# Patient Record
Sex: MALE | Race: WHITE
[De-identification: names, ages, dates, MRNs, and addresses within clinical notes are randomized per-mention and may not be internally consistent; named-entity substitution may affect disease eponyms.]

---

## 2017-05-26 ENCOUNTER — HOSPITAL ENCOUNTER (EMERGENCY)
Dept: HOSPITAL 68 - ERH | Age: 52
End: 2017-05-26
Payer: COMMERCIAL

## 2017-05-26 VITALS — WEIGHT: 170 LBS | BODY MASS INDEX: 22.53 KG/M2 | HEIGHT: 73 IN

## 2017-05-26 VITALS — SYSTOLIC BLOOD PRESSURE: 122 MMHG | DIASTOLIC BLOOD PRESSURE: 87 MMHG

## 2017-05-26 DIAGNOSIS — N18.9: Primary | ICD-10-CM

## 2017-05-26 LAB
ABSOLUTE GRANULOCYTE CT: 6.7 /CUMM (ref 1.4–6.5)
BASOPHILS # BLD: 0 /CUMM (ref 0–0.2)
BASOPHILS NFR BLD: 0.5 % (ref 0–2)
EOSINOPHIL # BLD: 0.1 /CUMM (ref 0–0.7)
EOSINOPHIL NFR BLD: 1.1 % (ref 0–5)
ERYTHROCYTE [DISTWIDTH] IN BLOOD BY AUTOMATED COUNT: 14.9 % (ref 11.5–14.5)
GRANULOCYTES NFR BLD: 77.2 % (ref 42.2–75.2)
HCT VFR BLD CALC: 43.1 % (ref 42–52)
LYMPHOCYTES # BLD: 0.9 /CUMM (ref 1.2–3.4)
MCH RBC QN AUTO: 25.9 PG (ref 27–31)
MCHC RBC AUTO-ENTMCNC: 32 G/DL (ref 33–37)
MCV RBC AUTO: 81 FL (ref 80–94)
MONOCYTES # BLD: 0.9 /CUMM (ref 0.1–0.6)
PLATELET # BLD: 221 /CUMM (ref 130–400)
PMV BLD AUTO: 10 FL (ref 7.4–10.4)
RED BLOOD CELL CT: 5.32 /CUMM (ref 4.7–6.1)
WBC # BLD AUTO: 8.6 /CUMM (ref 4.8–10.8)

## 2017-05-26 NOTE — ED GENERAL ADULT
History of Present Illness
 
General
Chief Complaint: Abdominal Pain/Flank Pain
Stated Complaint: "PER PT ABD PAIN"
Source: patient
Exam Limitations: no limitations
 
Vital Signs & Intake/Output
Vital Signs & Intake/Output
 Vital Signs
 
 
Date Time Temp Pulse Resp B/P B/P Pulse O2 O2 Flow FiO2
 
     Mean Ox Delivery Rate 
 
 1618 98.0 82 16 122/87  98 Room Air  
 
 1324 96.4 83 16 131/84  97 Room Air  
 
 1311      97 Room Air  
 
 1053 96.2 90 16 134/94  95 Room Air  
 
 
Allergies
Coded Allergies:
Iodinated Contrast Media - Oral and (IODINATED CONTRAST MEDIA - IV DYE) (RASH )
 
Reconcile Medications
Calcium Acetate 667 MG CAPSULE   1 CAP PO TID HEALTH SUPPLEMENT  (Reported)
Esomeprazole (Nexium) 40 MG CAPSULE.DR   1 CAP PO BID ACID REFLUX  (Reported)
Oxycodone HCl 5 MG TABLET   1 TAB PO BIDP PRN PAIN
 
Triage Note:
50 Y/O MALE C/O L SIDED ABDOMINAL PAIN X 2-3 DAYS,
LUQ AND LLQ.  REPORTS NAUSEA AND VOMITING BUT
DENIES DIARRHEA.  IS DIALYSIS PATIENT AND NO
LONGER MAKES URINE.  LAST DIALYSIS WEDNESDAY.
AFEBRILE
Triage Nurses Notes Reviewed? yes
Onset: Gradual
Duration: day(s): (3)
Timing: remote history
Injury Environment: home
Severity: severe
Severity Numbers: 8
No Modifying Factors: none
HPI:
Patient is a 51-year-old male with history of chronic renal disease, 
hypertension presenting to the emergency department with chief complaint of left
-sided flank and left-sided chest pain that began about 2-3 days ago has been 
constant.  Nothing seems to make it better or worse.  Patient reports he had 
similar pain when he headache left lung several years ago.  Denies any shortness
of breath.  No palpitations.  Denies any nausea or vomiting.  No diarrhea.  He 
thought he was constipated initially at onset of symptoms but has been moving 
his bowels of the past 2 days without difficulty.  No change in appetite.  
Denies taking anything at home to help with the pain.  No change with exertion 
or movement or position.  He has been on dialysis for the past 18 years, he goes
 and Friday.  He was on his way to dialysis this morning but 
decided that he should come to the emergency department for evaluation of this 
pain.
(DAVID SPEAR)
 
Past History
 
Travel History
Traveled to [place] past 21 day No
 
Medical History
Any Pertinent Medical History? see below for history
Neurological: CVA, migraine
EENT: NONE
Cardiovascular: hypertension
Respiratory: he had a collapsed left lung with a persistent left pleural 
effusion.
Gastrointestinal: GI BLEED
Hepatic: NONE
Renal: ESRD on HD
Musculoskeletal: osteoarthritis, secondary OSTEOPOROSIS GANGREEN L 2ND FINGER
Psychiatric: NONE
Endocrine: hyperparathyroidism, secondary hyperparathyroidism
Blood Disorders: NONE
Cancer(s): NONE
GYN/Reproductive: NONE
Other Medical Hx:
In  he had an infected abscess with MSSA bacteremia.
 Complete central venous occlusion in .
History of MRSA: No
History of VRE: No
History of CDIFF: No
 
Surgical History
Surgical History: GRAFT, LEFT THIGH he also had placement of numerous August 
catheters/also dual-lumen Amor's left radial cephalic primary aVF on 2000 left upper arm AVG Tenckhoff catheter placement and subsequent removal
 
Psychosocial History
Who do you live with Family
Services at Home Nursing
What is your primary language English
Tobacco Use: Never used
 
Family History
Family History, If Any:
MOTHER (Heart Dz, Breast Ca).
FATHER (Diabetes).
Relation not specified for:
  FH: stroke
 
Hx Contributory? Yes
(DAVID SPEAR)
 
Review of Systems
 
Review of Systems
Constitutional:
Reports: no symptoms. 
Comments
Review of systems: See HPI, All other systems negative.
Constitutional, no chills fever or weight loss
HEENT: No visual changes no sore throat no congestion
Cardiovascular: No palpitation , orthopnea or ankle swelling
Skin, no jaundice no rashes
Respiratory: No dyspnea cough sputum or hemoptysis
GI: No nausea no vomiting
: No dysuria No hematuria
Muscle skeletal: no back pain, no neck pain,
Neurologic: No numbness no confusion
Psych: No stress anxiety or depression,.
Heme/endocrine: No bruising no bleeding no polyuria or polydipsia
Immunology: No splenectomy or history of AIDS
(DAVID SPEAR)
 
Physical Exam
 
Physical Exam
General Appearance: alert, awake, mild distress
Comments:
Well-developed well-nourished person in mild distress
HEENT: Pupils equally round and reactive to light and accommodation. Nose is 
atraumatic. Pharynx normal. No swelling or edema. 
Neck: Supple, no lymphadenopathy, normal range of motion without pain or 
tenderness
Back: Nontender, no CVA tenderness. Full range of motion
Cardiovascular: Regular rate and rhythms no murmurs rubs or gallops, normal JVP
Respiratory: Chest nontender. No respiratory distress.breath sounds clear to 
auscultation bilaterally
Abdomen: Soft, nontender nondistended, no appreciable organomegaly. Normal bowel
sounds. No ascites Stanley rebound or guarding.
Extremity: No edema
Neuro: Alert oriented x3
Skin: No appreciable rash on exposed skin, skin is warm and dry.
Psych: Mood and affect is normal, memory and judgment is normal.
 
Core Measures
ACS in differential dx? Yes
CVA/TIA Diagnosis: No
Severe Sepsis Present: No
Septic Shock Present: No
(DAVID SPEAR)
 
Progress
Differential Diagnoses
I considered the following diagnoses in my evaluation of the patient:  ACS, 
pneumothorax,
 
Plan of Care:
 Orders
 
 
Procedure Date/time Status
 
TROPONIN LEVEL  1445 Complete
 
EKG  1445 Active
 
Telemetry/Cardiac Monitor  1116 Active
 
TSH REFLEX  1116 Complete
 
TROPONIN LEVEL  1116 Complete
 
LIPASE  1116 Complete
 
COMPREHENSIVE METABOLIC PANEL  1116 Complete
 
CBC WITHOUT DIFFERENTIAL  1116 Complete
 
EKG  1116 Active
 
 
 Laboratory Tests
 
 
 
17 1445:
Troponin I 0.01
 
17 1140:
Anion Gap 23  H, Estimated GFR 6  L, BUN/Creatinine Ratio 4.3  L, Glucose 77, 
Calcium 7.3  L, Total Bilirubin 0.8, AST 15  L, ALT 30, Alkaline Phosphatase 87,
Troponin I 0.01, Total Protein 8.0, Albumin 4.4, Globulin 3.6, Albumin/Globulin 
Ratio 1.2, Lipase 192, TSH &T3 &Free T4 Intrp 1.360, CBC w Diff NO MAN DIFF REQ,
RBC 5.32, MCV 81.0, MCH 25.9  L, RDW 14.9  H, MPV 10.0, Gran % 77.2  H, 
Lymphocytes % 10.5  L, Monocytes % 10.7  H, Eosinophils % 1.1, Basophils % 0.5, 
Absolute Granulocytes 6.7  H, Absolute Lymphocytes 0.9  L, Absolute Monocytes 
0.9  H, Absolute Eosinophils 0.1, Absolute Basophils 0, PUBS MCHC 32.0  L
Diagnostic Imaging:
Viewed by Me: Radiology Read.  Discussed w/RAD: Radiology Read. 
Radiology Impression: PATIENT: JC CANDELARIA  MEDICAL RECORD NO: [id number] 
PRESENT AGE: 51  PATIENT ACCOUNT NO: [id number] : [birth year]  LOCATION: [facility] 
ORDERING PHYSICIAN: DAVID DE LEON     SERVICE DATE:  EXAM 
TYPE: RAD - XRY-PORTABLE CHEST XRAY EXAMINATION: XR PORTABLE CHEST CLINICAL 
INFORMATION: Chest pain. Evaluate for cardiomegaly. COMPARISON: CXR from 2016 and 2016 TECHNIQUE: Portable AP view of the chest was obtained. 
FINDINGS: Chronic pleural-parenchymal fibrotic changes of the left hemithorax 
with pleural-based calcifications encasing the left lung. Chronic left lung 
volume loss with hyperexpansion of the right lung and left-sided shift of the 
cardiomediastinal silhouette. Cardiac silhouette is normal in size. No acute 
pulmonary edema or pneumothorax. Left axillary vascular stent is noted. No acute
skeletal findings. IMPRESSION: 1. No acute cardiopulmonary abnormality. 2. 
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 
compared to 2016.
Initial ED EKG: sinus rhythm at 85 bpm, first-degree AV block, low voltage 
throughout, nonspecific T abnormalities in the lateral leads
Prior EKG: changed (IMPROVED)
Repeat EKG: unchanged
Comments:
perc criteria zero (0)
 
Patient informed of all lab work results.  Repeat troponin is negative.  Repeat 
EKG unchanged.  Unclear as to etiology of patient's pain at this time.  Patient 
had total resolution after 2 mg of morphine.  Has been pain-free since 
administering that medication.  He'll follow-up with his primary doctor, he will
call his nephrologist to schedule dialysis later today or tomorrow morning.
(SAKSHI DE LEON,DAVID)
 
Departure
 
Departure
Time of Disposition: 
Disposition: HOME OR SELF CARE
Condition: Stable
Clinical Impression
Primary Impression: Flank pain
Secondary Impressions: 
Chronic renal failure
Qualifiers:  Chronic kidney disease stage: stage 4 (severe) Qualified Code: 
N18.4 - Chronic kidney disease, stage 4 (severe)
 
Lung fibrosis
 
Nausea
 
Referrals:
PATIENT HAS NO PRIMARY CARE DR (PCP/Family)
 
Additional Instructions:
Follow-up with your primary care physician call to make an appointment.  Return 
for worsening symptoms or concerns.  Take oxycodone as prescribed to help with 
any pain.  
Departure Forms:
Customer Survey
General Discharge Information
Prescriptions:
Current Visit Scripts
Oxycodone HCl 1 TAB PO BIDP PRN PAIN 
     #8 TAB 
 
 
(DAVID SPEAR)
 
PA/NP Co-Sign Statement
Statement:
ED Attending supervision documentation-
 
[] I saw and evaluated the patient. I have also reviewed all the pertinent lab 
results and diagnostic results. I agree with the findings and the plan of care 
as documented in the PA's/NP's documentation. 
 
[X] I have reviewed the ED Record and agree with the PA's/NP's documentation.
 
[] Additions or exceptions (if any) to the PAs/NP's note and plan are 
summarized below:
[]
 
(JOHN BEDOLLA,ARPAN)
 
Critical Care Note
 
Critical Care Note
Critical Care Time: non-applicable
(DAVID SPEAR)

## 2017-05-26 NOTE — RADIOLOGY REPORT
EXAMINATION:
XR PORTABLE CHEST
 
CLINICAL INFORMATION:
Chest pain. Evaluate for cardiomegaly.
 
COMPARISON:
CXR from 03/22/2016 and 05/20/2016
 
TECHNIQUE:
Portable AP view of the chest was obtained.
 
FINDINGS:
Chronic pleural-parenchymal fibrotic changes of the left hemithorax with
pleural-based calcifications encasing the left lung.
 
Chronic left lung volume loss with hyperexpansion of the right lung and
left-sided shift of the cardiomediastinal silhouette. Cardiac silhouette is
normal in size. No acute pulmonary edema or pneumothorax. Left axillary
vascular stent is noted.
 
No acute skeletal findings.
 
IMPRESSION:
 
1. No acute cardiopulmonary abnormality.
2. 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
compared to 03/22/2016.

## 2018-03-19 ENCOUNTER — HOSPITAL ENCOUNTER (INPATIENT)
Dept: HOSPITAL 68 - ERH | Age: 53
LOS: 10 days | Discharge: HOME HEALTH | DRG: 570 | End: 2018-03-29
Admitting: INTERNAL MEDICINE
Payer: COMMERCIAL

## 2018-03-19 VITALS — BODY MASS INDEX: 26.77 KG/M2 | WEIGHT: 202 LBS | HEIGHT: 73 IN

## 2018-03-19 VITALS — DIASTOLIC BLOOD PRESSURE: 66 MMHG | SYSTOLIC BLOOD PRESSURE: 103 MMHG

## 2018-03-19 DIAGNOSIS — Z89.022: ICD-10-CM

## 2018-03-19 DIAGNOSIS — Z99.2: ICD-10-CM

## 2018-03-19 DIAGNOSIS — E21.3: ICD-10-CM

## 2018-03-19 DIAGNOSIS — L97.429: Primary | ICD-10-CM

## 2018-03-19 DIAGNOSIS — N18.6: ICD-10-CM

## 2018-03-19 DIAGNOSIS — I95.9: ICD-10-CM

## 2018-03-19 DIAGNOSIS — M10.9: ICD-10-CM

## 2018-03-19 DIAGNOSIS — K21.9: ICD-10-CM

## 2018-03-19 DIAGNOSIS — Z86.73: ICD-10-CM

## 2018-03-19 DIAGNOSIS — Z91.041: ICD-10-CM

## 2018-03-19 DIAGNOSIS — M86.8X7: ICD-10-CM

## 2018-03-19 DIAGNOSIS — L03.116: ICD-10-CM

## 2018-03-19 LAB
ABSOLUTE GRANULOCYTE CT: 9 /CUMM (ref 1.4–6.5)
APTT BLD: 36 SEC (ref 25–37)
BASOPHILS # BLD: 0.1 /CUMM (ref 0–0.2)
BASOPHILS NFR BLD: 0.5 % (ref 0–2)
EOSINOPHIL # BLD: 0.2 /CUMM (ref 0–0.7)
EOSINOPHIL NFR BLD: 1.4 % (ref 0–5)
ERYTHROCYTE [DISTWIDTH] IN BLOOD BY AUTOMATED COUNT: 21.7 % (ref 11.5–14.5)
GRANULOCYTES NFR BLD: 80 % (ref 42.2–75.2)
HCT VFR BLD CALC: 48.8 % (ref 42–52)
LYMPHOCYTES # BLD: 0.9 /CUMM (ref 1.2–3.4)
MCH RBC QN AUTO: 26.5 PG (ref 27–31)
MCHC RBC AUTO-ENTMCNC: 31.7 G/DL (ref 33–37)
MCV RBC AUTO: 83.6 FL (ref 80–94)
MONOCYTES # BLD: 1.1 /CUMM (ref 0.1–0.6)
PLATELET # BLD: 256 /CUMM (ref 130–400)
PMV BLD AUTO: 10.3 FL (ref 7.4–10.4)
PROTHROMBIN TIME: 11.7 SEC (ref 9.4–12.5)
RED BLOOD CELL CT: 5.84 /CUMM (ref 4.7–6.1)
WBC # BLD AUTO: 11.2 /CUMM (ref 4.8–10.8)

## 2018-03-19 PROCEDURE — C1725 CATH, TRANSLUMIN NON-LASER: HCPCS

## 2018-03-19 PROCEDURE — 2NSBP: CPT

## 2018-03-19 PROCEDURE — C1760 CLOSURE DEV, VASC: HCPCS

## 2018-03-19 NOTE — ED ANKLE/FOOT INJURY COMPLAINT
History of Present Illness
 
General
Chief Complaint: Laceration Procedure
Stated Complaint: L LEG "CUT" ?INFECTION
Source: patient
Exam Limitations: no limitations
 
Vital Signs & Intake/Output
Vital Signs & Intake/Output
 Vital Signs
 
 
Date Time Temp Pulse Resp B/P B/P Pulse O2 O2 Flow FiO2
 
     Mean Ox Delivery Rate 
 
03/19 2233 97.6 94 18 103/66  99 Room Air  
 
03/19 2041 96.8 93 18 92/50  100 Room Air  
 
03/19 1732 97.6 101 18 102/70  92 Room Air  
 
03/19 1608 97.0 98 20 109/69  94 Room Air  
 
 
 
Allergies
Coded Allergies:
Iodinated Contrast- Oral and IV Dye (IODINATED CONTRAST MEDIA - IV DYE) (RASH 03
/19/18)
 
Triage Note:
TRIAGE: PT TO ER FOR CUT ON L HEEL, STATES THEY
LOOKED AT IT AT DIALYSIS TODAY AND THEY SEND TO
COME AND HAVE IT LOOKED AT AS IT MAY BE INFECTED.
NOT VISUALIZED AT TRIAGE, HAS SHOES/SOCKS ON.
DENIES ANY PAIN AT PRESENT BUT STATES HE HAD PAIN
LAST NIGHT.
PT GETS DIALYSIS M-W-F THRU ACCESS ON LEFT UPPER
LEG.
Triage Nurses Notes Reviewed? yes
Occurred: last week
Duration: week(s): (1), constant, continues in ED, getting worse
Timing: single episode today
Severity: moderate, severe
Severity Numbers: 6
Pain/Injury Location:
Left: Heel. 
Method of Injury: unknown
No Modifying Factors: none
Associated Symptoms: swelling, redness
HPI:
52-year-old male past medical history of end-stage renal disease on dialysis 
presents for evaluation of a wound to left heel.  Patient reports he first 
noticed this about one week ago.  He went to dialysis today and the dialysis 
nurses felt like it may be infected.  He denies any known trauma to the area or 
triggering event.  He is not a diabetic.  He is on dialysis related to 
hypertension.  He goes Monday Wednesday Friday.  He denies any fever or chills 
numbness or tingling.  No other joint swelling or pain.  The pain is worse when 
he is walking but he is able to walk.  He rates pain as a 6 or 7 out of 10 with 
walking.  He has not taken any medicine for his pain.  No chest pain shortness 
of breath palpitations or any other associated symptoms.
(Jer DE LEON,Romain)
Reconcile Medications
Esomeprazole (Nexium) 40 MG CAPSULE.   1 CAP PO BID ACID REFLUX  (Reported)
Omeprazole 20 MG CAPSULE.   1 CAP PO DAILY ACID REFLUX  (Reported)
 
(Oleg BEDOLLA,Bryon CAMPBELL)
 
Past History
 
Travel History
Traveled to [place] past 21 day No
 
Medical History
Any Pertinent Medical History? see below for history
Neurological: CVA, migraine
EENT: NONE
Cardiovascular: hypertension
Respiratory: he had a collapsed left lung with a persistent left pleural 
effusion.
Gastrointestinal: GI BLEED
Hepatic: NONE
Renal: ESRD on HD, DIALYSIS M-W-F ACCESS TO L LEG
Musculoskeletal: osteoarthritis, secondary OSTEOPOROSIS GANGREEN L 2ND FINGER
Psychiatric: NONE
Endocrine: hyperparathyroidism
Blood Disorders: NONE
Cancer(s): NONE
GYN/Reproductive: NONE
Other Medical Hx:
In 2004 he had an infected abscess with MSSA bacteremia.
 Complete central venous occlusion in 2007.
History of MRSA: No
History of VRE: No
History of CDIFF: No
 
Surgical History
Surgical History: GRAFT, LEFT THIGH he also had placement of numerous August 
catheters/also dual-lumen Amor's left radial cephalic primary aVF on 03/28/
2000 left upper arm AVG Tenckhoff catheter placement and subsequent removal
 
Psychosocial History
Who do you live with Family
Services at Home Nursing
What is your primary language English
Tobacco Use: Never used
ETOH Use: denies use
Illicit Drug Use: denies illicit drug use
 
Family History
Family History, If Any:
MOTHER (Heart Dz, Breast Ca).
FATHER (Diabetes).
Relation not specified for:
  FH: stroke
 
Hx Contributory? No
(Romain Shah)
 
Review of Systems
 
Review of Systems
Constitutional:
Reports: no symptoms. 
EENTM:
Reports: no symptoms. 
Respiratory:
Reports: no symptoms. 
Cardiovascular:
Reports: no symptoms. 
GI:
Reports: no symptoms. 
Genitourinary:
Reports: no symptoms. 
Musculoskeletal:
Reports: no symptoms. 
Skin:
Reports: see HPI, erythema, lesions. 
Neurological/Psychological:
Reports: no symptoms. 
Hematologic/Endocrine:
Reports: no symptoms. 
Immunologic/Allergic:
Reports: no symptoms. 
All Other Systems: Reviewed and Negative
(Romain Shah)
 
Physical Exam
 
Physical Exam
General Appearance: well developed/nourished, no apparent distress, alert, awake
Head: atraumatic, normal appearance
Eyes:
Bilateral: normal appearance, PERRL, EOMI. 
Ears, Nose, Throat: normal pharynx, normal ENT inspection, hearing grossly 
normal
Neck: normal inspection, supple, full range of motion
Cardiovascular/Respiratory: normal breath sounds, normal peripheral pulses, 
regular rate/rhythm, no respiratory distress
Back: normal inspection, normal range of motion
Leg/Knee/Thigh Left: normal range of motion, normal inspection
Leg/Knee/Thigh Right: normal range of motion, normal inspection
Ankle Left: normal inspection, normal range of motion
Ankle Right: normal inspection, normal range of motion
Foot Left: THERE IS A ULCERATED WOUND TO THE POSTERIOR ASPECT OF THE LEFT HEEL 
JUST INFERIOR TO THE INSERTION OF THE aCHILLES TENDON.  iT IS APPROXIMATELY 4 CM
X 2 CM ULCERATION.  tHERE IS SURROUNDING ERYTHEMA AND MACERATED TISSUE.  tHE 
ERYTHEMA EXTENDS ALONG THE MEDIAL ASPECT OF THE FOOT TO THE TOES.  tHERE IS MILD
SOFT TISSUE SWELLING.  tHERE IS NO LYMPHATIC STREAKING OR ERYTHEMA GOING UP THE 
LEG.  nO CALF SWELLING OR TENDERNESS FULL RANGE OF MOTION OF THE FOOT IS INTACT 
NEUROVASCULAR SUPPLY INTACT
Foot Right: normal inspection, normal range of motion
Neuro/Vascular: normal motor function, normal sensation
Tendon: normal tendon function
Psychiatric: awake, alert, oriented x 3
Skin: intact, normal color, warm/dry
(Black PA,Romain)
 
Progress
Differential Diagnosis: arterial insufficiency, cellulitis, septic arthritis, 
gout, fracture, dislocation, sprain, contusion, OSTEOMYELITIS
Plan of Care:
 Orders
 
 
Procedure Date/time Status
 
Nothing by Mouth 03/20 B Active
 
Wound Care/Dressing 03/19 2302 Active
 
Weight 03/19 2231 Active
 
Vital Signs 03/19 2231 Active
 
Teach/Educate 03/19 2231 Active
 
Pain Treatment and Response 03/19 2231 Active
 
Nutritional Intake, Monitor 03/19 2231 Active
 
Isolation 03/19 2231 Active
 
Intake & Output 03/19 2231 Active
 
Patient Care Conference 03/19 2231 Active
 
Activity/Ambulation 03/19 2231 Active
 
LACTIC ACID 03/19 2137 Complete
 
ED Holding Orders 03/19 2121 Active
 
Admit to inpatient 03/19 2121 Active
 
Vital Signs 03/19 2121 Active
 
Code Status 03/19 2121 Active
 
Patient Data 03/19 2116 Active
 
BLOOD CULTURE 03/19 2035 Active
 
Add-on Test (ER Only) 03/19 2033 Active
 
EKG 03/19 2033 Active
 
Add-on Test (ER Only) 03/19 2018 Active
 
TROPONIN LEVEL 03/19 1852 Complete
 
PARTIAL THROMBOPLASTIN TIME 03/19 1852 Complete
 
PROTHROMBIN TIME 03/19 1852 Complete
 
TYPE & SCREEN (NOT X-MATCH) 03/19 1852 Complete
 
BLOOD CULTURE 03/19 1837 Active
 
LACTIC ACID 03/19 1837 Complete
 
WESTERGREN SED RATE 03/19 1837 Complete
 
C-REACTIVE PROTEIN 03/19 1837 Complete
 
COMPREHENSIVE METABOLIC PANEL 03/19 1837 Complete
 
CBC WITHOUT DIFFERENTIAL 03/19 1837 Complete
 
 
 Laboratory Tests
 
 
 
03/19/18 2100:
Lactic Acid 1.7
 
03/19/18 1852:
Anion Gap 27  H, Estimated GFR 8  L, BUN/Creatinine Ratio 3.6  L, Glucose 83, 
Lactic Acid 2.8  H, Calcium 8.6, Total Bilirubin 0.8, AST 17, ALT 14  L, 
Alkaline Phosphatase 84, Troponin I 0.02, C-Reactive Prot, Quant 4.7  H, Total 
Protein 9.1  H, Albumin 4.8, Globulin 4.3  H, Albumin/Globulin Ratio 1.1, PT 
11.7, INR 1.07, APTT 36, CBC w Diff NO MAN DIFF REQ, RBC 5.84, MCV 83.6, MCH 
26.5  L, MCHC 31.7  L, RDW 21.7  H, MPV 10.3, Gran % 80.0  H, Lymphocytes % 8.2 
L, Monocytes % 9.9  H, Eosinophils % 1.4, Basophils % 0.5, Absolute Granulocytes
9.0  H, Absolute Lymphocytes 0.9  L, Absolute Monocytes 1.1  H, Absolute 
Eosinophils 0.2, Absolute Basophils 0.1, ESR Westergren 21  H
 Microbiology
03/19 2100  BLOOD: Blood Culture - RECD
03/19 2035  BLOOD: Blood Culture - CAN
                Cancelled: DUPLICATED
03/19 1858  BLOOD: Blood Culture - CAN
                Cancelled: Quantity not sufficient for both blood culture 
bottles.
03/19 1852  BLOOD: Blood Culture - RECD
 
Patient seen and evaluated.  He has history of end-stage renal on dialysis he 
was dialyzed today.  He has a wound to the left heel.  He is not a diabetic.  He
denies any trauma.  X-rays of the area reveal a bony defect along the posterior 
calcaneus that could be osteomyelitis.  Patient does have a mildly elevated 
white blood cell count with left shift and lactic acidosis.  500 mL normal 
saline ordered.  Blood cultures obtained patient does have a mildly elevated CRP
and sedimentation rate of 21.  Vital signs are otherwise stable patient appears 
well.  Case was discussed with Dr. Crocker podiatrist.  He recommends admitting
the patient for bone biopsy and IV antibiotics.  He recommends holding off on IV
antibiotics until bone biopsy.  Patient will require podiatry, nothing by mouth,
serial labs, bone biopsy, follow-up cultures, pain control, monitoring of vital 
signs and physical therapy consult.  Case discussed with Dr. Dejesus he 
agrees.
Diagnostic Imaging:
Viewed by Me: Radiology Read.  Discussed w/RAD: Radiology Read. 
Radiology Impression: EXAM TYPE: RAD - XRY-HEEL, LEFT EXAMINATION: XR CALCANEUS,
LEFT CLINICAL INFORMATION: Osteomyelitis COMPARISON: None TECHNIQUE: Lateral and
axial views of the left calcaneus were obtained. FINDINGS: Vascular 
calcifications. There is felt to be a bony defect along the distal aspect of the
calcaneus and certainly focal osteomyelitis here cannot be excluded. This is 
along the undersurface. There may be bony fusion here. Vascular calcifications. 
IMPRESSION: As described above I cannot rule out osteomyelitis along the 
undersurface of the distal calcaneus. Recommend MR pre and postcontrast further 
evaluate DICTATED BY: Rupert Melendez MD  DATE/TIME DICTATED:03/19/18 / 1926 
:RAD.TONY  DATE/TIME TRANSCRIBED:03/19/18 / 1926
Initial ED EKG: SINUS TACHYCARDIA RATE 106, VENTRICULAR BIGEMINY
(Romain Shah)
 
Departure
 
Departure
Disposition: STILL A PATIENT
Condition: Stable
Clinical Impression
Primary Impression: Osteomyelitis
Qualifiers:  Osteomyelitis type: unspecified type Osteomyelitis location: foot 
Laterality: left Qualified Code: M86.9 - Osteomyelitis, unspecified
Referrals:
Patient Has No Primary Care Dr (PCP/Family)
 
Departure Forms:
Customer Survey
General Discharge Information
 
Admission Note
Spoke With:
Karol Garces MD
Documentation of Exam:
Documentation of any treatments & extenuating circumstances including Concerns 
Regarding Discharge (functional status, medication knowledge or non-compliance, 
living conditions, etc.) that warrant an admission rather than observation: [
Serial labs, podiatry consult, bone biopsy, IV antibiotics, physical therapy]
 
(Romain Shah)
 
PA/NP Co-Sign Statement
Statement:
ED Attending supervision documentation-
 
[X] I saw and evaluated the patient. I have also reviewed all the pertinent lab 
results and diagnostic results. I agree with the findings and the plan of care 
as documented in the PA's/NP's documentation. 
 
[X] I have reviewed the ED Record and agree with the PA's/NP's documentation.
 
[] Additions or exceptions (if any) to the PAs/NP's note and plan are 
summarized below:
[ADMIT FOR BONE BIOPSY, IV ABX, PODIATRY CONSULTATION, NEPHROLOGY CONSULTATION]
 
(Oleg BEDOLLA,Bryon CAMPBELL)
 
(Oleg BEDOLLA,Bryon KEARNS.)

## 2018-03-19 NOTE — RADIOLOGY REPORT
EXAMINATION:
XR CALCANEUS, LEFT
 
CLINICAL INFORMATION:
Osteomyelitis
 
COMPARISON:
None
 
TECHNIQUE:
Lateral and axial views of the left calcaneus were obtained.
 
FINDINGS:
Vascular calcifications.
 
There is felt to be a bony defect along the distal aspect of the calcaneus
and certainly focal osteomyelitis here cannot be excluded. This is along the
undersurface. There may be bony fusion here.
 
Vascular calcifications.
 
IMPRESSION: As described above I cannot rule out osteomyelitis along the
undersurface of the distal calcaneus. Recommend MR pre and postcontrast
further evaluate

## 2018-03-19 NOTE — HISTORY & PHYSICAL
Seferino[place]walk 03/19/18 2134:
General Information and HPI
MD Statement:
I have seen and personally examined JC CANDELARIA and documented this H&P.
 
The patient is a 52 year old M who presented with a patient stated chief 
complaint of pain in left foot and chronic wound on left heel. [].
 
Source of Information: patient, old records
Exam Limitations: no limitations
History of Present Illness:
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 
abuse maintained on hemodialysis since April 2000, HTN, hyperparathyroidism, CVA
, seizures, gout, GERD, GI bleed in 2011, left upper extremity finger amputation
due to chronic osteomyelitis with gangrene and MSSA bacteremia presented to ED 
with chief complaint of left foot pain and chronic left heel ulcer.  Patient 
reported that he was in his usual state of health, one-week back in noticed 
intermittent pain in left heel.  Pain is throbbing,10/10, intermittent, 
increased with lifting left foot and relieved with rest.  He also reported that 
there is is a scab on his left heel that was there for a while.  Patient 
reported redness of the left foot for same time.  He reported that he is getting
dialysis on Monday, Wednesday and Friday.  Today he went to dialysis clinic with
staff noticed redness and scab on left foot and they send the patient to ED for 
evaluation.
Patient denied any chest pain, short of breath, palpitation, chills, fever, 
nausea, vomiting, abdominal pain, discharge from wound, lightheadedness, trauma 
to left foot, pain in legs while walking, melena, blood in vomitus, hemoptysis, 
diarrhea, constipation and dysuria.
Patient reported that he is seeing Dr. Wolff for his kidney problem.  
Patient reported that he had hypertension that caused his kidney problem but 
later on his blood pressure was normal and he is not taking any medication for 
it.  Patient is walking independently without any assistance.  During patient 
examination he became angry and didn't allow us to examine his right foot.  Last
time he was admitted in the hospital in April 2016 with complain of double 
vision and headache.  His last echocardiogram shows ejection fraction or than 60
%.
 
ED course:
Vitals: Temperature 97.0, pulse 98, respiratory rate 20, blood pressure 109/69, 
oxygen saturation 94% on room air
 
Labs: WBC count 11.2, hemoglobin 15.5, hematocrit 48.8, palate count 256, sodium
144, potassium 4.5, BUN 25, creatinine 7.0, anion gap 27, BUN/creatinine ratio 
3.6, glucose 83, lactic acid 2.8, calcium 8.6, AST 17, at 1540, current was 284,
troponin 0.02
 
Allergies/Medications
Allergies:
Coded Allergies:
Iodinated Contrast- Oral and IV Dye (IODINATED CONTRAST MEDIA - IV DYE) (RASH 03
/19/18)
 
 
Past History
 
Travel History
Traveled to [place] past 21 day No
 
Medical History
Neurological: CVA, migraine
EENT: NONE
Cardiovascular: hypertension
Respiratory: he had a collapsed left lung with a persistent left pleural 
effusion.
Gastrointestinal: GI BLEED
Hepatic: NONE
Renal: ESRD on HD, DIALYSIS M-W-F ACCESS TO L LEG
Musculoskeletal: osteoarthritis, secondary OSTEOPOROSIS GANGREEN L 2ND FINGER
Psychiatric: NONE
Endocrine: hyperparathyroidism
Blood Disorders: NONE
Cancer(s): NONE
GYN/Reproductive: NONE
Other Medical Hx:
In 2004 he had an infected abscess with MSSA bacteremia.
 Complete central venous occlusion in 2007.
History of MRSA: No
History of VRE: No
History of CDIFF: No
 
Surgical History
Surgical History: GRAFT, LEFT THIGH he also had placement of numerous August 
catheters/also dual-lumen Amor's left radial cephalic primary aVF on 03/28/
2000 left upper arm AVG Tenckhoff catheter placement and subsequent removal
 
Past Family/Social History
 
Family History
Relations & Conditions if any
MOTHER (Heart Dz, Breast Ca).
FATHER (Diabetes).
Relation not specified for:
  FH: stroke
 
 
Psychosocial History
Who Do You Live With? self
Services at Home: Nursing
Primary Language: English
ETOH Use: denies use
Illicit Drug Use: denies illicit drug use
 
Functional Ability
ADLs
Independent: dressing, eating. 
Ambulation: cane
IADLs
Independent: shopping, housework, finances. 
 
Review of Systems
 
Review of Systems
Constitutional:
Reports: no symptoms. 
EENTM:
Reports: no symptoms. 
Cardiovascular:
Reports: no symptoms. 
Respiratory:
Reports: no symptoms. 
GI:
Reports: no symptoms. 
Genitourinary:
Reports: no symptoms. 
Musculoskeletal:
Reports: see HPI. 
Skin:
Reports: see HPI. 
Neurological/Psychological:
Reports: no symptoms. 
 
Exam & Diagnostic Data
 
Physical Exam
General Appearance Alert, Oriented X3, Cooperative
Skin No Rashes
Skin Temp/Moisture Exam: Warm/Dry
Sepsis Skin Exam (color): Normal for Ethnicity
HEENT Atraumatic, PERRLA, EOMI
Neck Supple
Cardiovascular Normal S1, Normal S2
Lungs Clear to Auscultation
Abdomen Soft, No Tenderness
Neurological Normal Speech, Strength at 5/5 X4 Ext, Normal Tone
Extremities No Edema, Left foot redness on medial side and chronic wood on heel
 
Assessment/Plan
Assessment:
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 
abuse maintained on hemodialysis since April 2000, HTN, CVA, seizures, gout, 
GERD, GI bleed in 2011, left upper extremity finger amputation due to chronic 
osteomyelitis with gangrene and  MSSA bacteremia presented to ED with chief 
complaint of left foot pain and chronic left heel ulcer.
 
We'll admit the patient on general medicine floor for treatment for possible 
left foot osteomyelitis.
 
Left foot osteomyelitis:
-We will keep patient nothing by mouth
-Left calcaneal bone biopsy
-Considering patient's end-stage renal disease and also allergic to are donated 
contrast MRI cannot be done.
-Pain medication
-Doppler study lower extremities to look for peripheral vascular disease.
-ESR
-Podiatry consult
-Patient spikes fever we will do pancultures and start antibiotic.
 
History of peripheral vascular disease and TIA:
-Considering peripheral vascular disease is equivalent to cardiac suspect we 
will start patient on aspirin after bone biopsy.
-We will start atorvastatin.
 
History of GERD:
-We will continue omeprazole.
 
History of end-stage renal disease on dialysis:
-Continue scheduled dialysis
 
H/O of hypertension:
-Not on any medication
 
DVT prophylaxis:
Mechanical and subcutaneous heparin
 
CODE STATUS:
Full code
 
As Ranked By This Provider
Problem List:
 1. Osteomyelitis
   Qualifiers
 Osteomyelitis type: unspecified type Osteomyelitis location: foot Laterality: 
left Qualified Code: M86.9 - Osteomyelitis, unspecified
 
 
Core Measures/Misc (9/17)
 
Acute Coronary Syndrome
ACS Diagnosis: No
 
Congestive Heart Failure
Congestive Heart Failure Diagnosis No
 
Cerebrovascular Accident
CVA/TIA Diagnosis: No
 
VTE (View Protocol)
VTE Risk Factors Age>40
No Mechanical VTE Prophylaxis d/t N/A MechProphylax Ordered
No VTE Pharm Prophylaxis d/t NA PharmProphylax ordered
 
Sepsis (View protocol)
Sepsis Present: No
 
FeiRebeca 03/20/18 0200:
General Information and HPI
 
Allergies/Medications
Home Med list
Esomeprazole (Nexium) 40 MG CAPSULE.   1 CAP PO BID ACID REFLUX  (Reported)
 
 
Exam & Diagnostic Data
Last 24 Hrs of Vital Signs/I&O
 Vital Signs
 
 
Date Time Temp Pulse Resp B/P B/P Pulse O2 O2 Flow FiO2
 
     Mean Ox Delivery Rate 
 
03/20 0729 97.6 84 20 103/72  96 Room Air  
 
03/19 2233 97.6 94 18 103/66  99 Room Air  
 
03/19 2041 96.8 93 18 92/50  100 Room Air  
 
03/19 1732 97.6 101 18 102/70  92 Room Air  
 
03/19 1608 97.0 98 20 109/69  94 Room Air  
 
 
 Intake & Output
 
 
 03/20 0800 03/20 0000 03/19 1600
 
Intake Total 200  
 
Output Total   
 
Balance 200  
 
    
 
Intake,   
 
Patient 193 lb 185 lb 
 
Weight   
 
Weight Bed scale Reported by Patient 
 
Measurement   
 
Method   
 
 
 
 
 
Core Measures/Misc (9/17)
 
Acute Coronary Syndrome
ACS Diagnosis: No
 
Congestive Heart Failure
Congestive Heart Failure Diagnosis No
 
Cerebrovascular Accident
CVA/TIA Diagnosis: No
 
VTE (View Protocol)
VTE Risk Factors Age>40
No Mechanical VTE Prophylaxis d/t N/A MechProphylax Ordered
No VTE Pharm Prophylaxis d/t NA PharmProphylax ordered
 
Sepsis (View protocol)
Sepsis Present: No
 
Resident Review Statement
Resident Statement: examined this patient, discussed with intern
Other Findings:
Patient is 52-year-old  male with past medical history significant for 
peripheral vascular disease, end-stage renal disease on dialysis since April 2000, hypertension, hyperparathyroidism, TIA, questionable seizures, gout, GERD,
left index finger amputation due to chronic osteomyelitis and MSSA bacteremia 
came with chief complaint of worsening right heel wound concerning for 
osteomyelitis.  Patient had throbbing left heel pain for last 1 week which was 
intermittent and throbbing in nature.  He noticed redness of his medial aspect 
of left foot and heel with large scale be on left heel without any apparent 
discharge but did not seek any medical attention.  Today he went to his usual 
dialysis and was examined by nurses which were concerned and sent him to ER for 
further evaluation.  He also mentioned that he had some redness on medial 
aspects of the right foot as well.  He denied any fever, chills, headache, 
dizziness, chest pain, shortness of breath, nausea, vomiting, diarrhea, 
constipation or any urinary complaints.  He has slight left upper extremity 
weakness which is chronic and at his baseline.  He uses cane recently for walk 
but denied any gait imbalance or recent fall.  Patient is noncompliant with his 
medication and not taking aspirin or statin which were prescribed while last 
discharge from hospital in May 2016.
 
Vital signs on admission were temperature 97.6, was 101, respiratory rate 18, 
blood pressure 102/70 and he was saturating 92% on room air.
 
Labs were significant for WBC count 11.2, hemoglobin 15.5, hematocrit 48.8, 
platelet count 256,
Sodium 144, potassium 4.5, BUN 25, creatinine 7.0, lactic acid 2. 8 repeat was 
1.7, C-reactive protein 4.7,
 
X-ray heel showed bony defect along the distal aspect of the calcaneus
and certainly focal osteomyelitis here cannot be excluded.
 
Patient refused chest x-ray
 
On examination
Alert and oriented 3
Head atraumatic
Neck supple no JVD
Chest auscultation shows slight rhonchi on right base
Abdomen soft no organomegaly
Heart S1-S2 normal no added sounds
Upper extremities showed slight 4 x 5 strength of left upper extremity at 
baseline, left index finger 
Amputation
Left lower extremity showed left heel wound almost 3-4 inches with necrotic 
areas, no apparent discharge, medial aspect of both feet were red, normal pulses
 
Assessment and plan
52-year-old  male with history of chronic osteomyelitis of left index 
finger status post amputation, history of TIA, GERD, hyperparathyroidism, end-
stage renal disease on dialysis on Mondays Wednesdays and Fridays came in with 
chief complaint of left heel nonhealing ulcer with imaging evidence of 
osteomyelitis.
We will admit him on general medical floor and will address following problems
Problem list
1.  Left heel ulcer concerning for osteomyelitis
2.  End-stage renal disease on dialysis
3.  History of TIA not on statins aspirin most probably due to noncompliance
4.  History of GERD
 
Plan
1.  We will admit him on general medical floor.  We will monitor daily labs 
include a WBC and basic electrolyte panel
2.  We will keep him nothing by mouth overnight for bone biopsy by Dr. Crocker 
in a.m. 
3.  Podiatry consultation in a.m.
4.  Nephrology consultation in a.m. and will resume his dialysis on scheduled 
days.
5.  We will avoid nephrotoxic's
6.  We will follow-up on blood culture results which were already been sent
7.  We will consider starting aspirin after biopsy
8.  We will consider starting statins from a.m.
9.  We will continue his PPIs.
10.  Adequate analgesia
 
Patient is full code
Pharmacological  DVT prophylaxis with subcutaneous heparin
Nothing by mouth
 
 
Dez BEDOLLA, \[facility]\"" 03/20/18 0612:
Attending MD Review Statement
 
Attending Statement
Attending MD Statement: examined this patient, discuss w/resident/PA/NP, agreed 
w/resident/PA/NP, reviewed images, amended to note
Attending Assessment/Plan:
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 
seizure, collapsed lung s/p thoracotomy, left index finger amputation for 
osteomyelitis, central retinal vein occlusion, PVD, is sent in from dialysis 
after RN noticed a nonhealing wound to his left heel. Patient reports throbbing 
pain to his left foot for past 1 week but denies trauma to the foot. Patient 
denies fever/ chills or abnormal discharge from the left heel. He is not on any 
medications other than PPI. Based on previous records he was on aspirin, plavix,
statin but patient states he was taken off them. 
 
Vitals stable. Left heel wound as described above, patient was not co-operative 
with exam. Bilateral feet have ecchymotic patches, shiny skin to medial aspect, 
peripheral pulses not well felt. Left thigh AV fistula++.
Labs: WBC 11.2, ESR 21, INR 1.07, AG 27, BUN 25, creat 7.0, lactic acid 2.8, 
trop neg, CRP elevated. 
Heel Xray: bony defect along distal aspect of calcaneus and focal osteomyelitis 
cannot be excluded.
EKG: sinus tachycardia, ventricular bigeminy, Qtc 505.
Echo (2016): EF > 60%. 
 
Assessment and plan:
1. Left heel chronic nonhealing wound concerning for osteomyelitis
2. Left foot cellulitis
3. Peripheral vascular disease
4. ESRD on dialysis
5. Transient hypotension - resolved
 
- Admit to general medicine
- Panculture
- NPO after midnight
- Podiatry consult
- OR for bone biopsy in AM
- Watch off antibiotics for now
- ID consult to help with antibiotics
- Nephro consult for dialysis
- Check TSH, free T4, HbA1c, lipid panel.
- Obtain arterial doppler to assess for PVD
- Patient received 1 bag 500 mls fluid in ER, will be cautious with IV fluids 
given dialysis patient.
DVT ppx Hep SC. Full code.

## 2018-03-20 VITALS — SYSTOLIC BLOOD PRESSURE: 97 MMHG | DIASTOLIC BLOOD PRESSURE: 60 MMHG

## 2018-03-20 VITALS — DIASTOLIC BLOOD PRESSURE: 60 MMHG | SYSTOLIC BLOOD PRESSURE: 101 MMHG

## 2018-03-20 VITALS — SYSTOLIC BLOOD PRESSURE: 103 MMHG | DIASTOLIC BLOOD PRESSURE: 72 MMHG

## 2018-03-20 NOTE — PN- HOUSESTAFF
Jovan BEDOLLA,[facility] 18 0706:
Subjective
Follow-up For:
Foot Ulcer
Subjective:
Patient was seen and examined at bedside. He is upset that every time he comes 
to the hospital there is something wrong found with him. He wants to get done 
with the procedure as soon as possible. 
 
Review of Systems
Constitutional:
Reports: no symptoms. 
Musculoskeletal:
Reports: joint pain. 
 
Objective
Last 24 Hrs of Vital Signs/I&O
 Vital Signs
 
 
Date Time Temp Pulse Resp B/P B/P Pulse O2 O2 Flow FiO2
 
     Mean Ox Delivery Rate 
 
 0729 97.6 84 20 103/72  96 Room Air  
 
 2233 97.6 94 18 103/66  99 Room Air  
 
 2041 96.8 93 18 92/50  100 Room Air  
 
 1732 97.6 101 18 102/70  92 Room Air  
 
 1608 97.0 98 20 109/69  94 Room Air  
 
 
 Intake & Output
 
 
  1600  0800  0000
 
Intake Total  200 
 
Output Total   
 
Balance  200 
 
    
 
Intake, IV  200 
 
Patient  193 lb 185 lb
 
Weight   
 
Weight  Bed scale Reported by Patient
 
Measurement   
 
Method   
 
 
 
 
Physical Exam
General Appearance: Alert, Oriented X3, No Acute Distress
Skin: small ulcer with pus drainage on heel of left foot
Skin Temp/Moisture Exam: Warm/Dry
Sepsis Skin Exam (color): Normal for Ethnicity
HEENT: Atraumatic
Cardiovascular: Normal S1, Normal S2, No Murmurs
Lungs: Clear to Auscultation, Normal Air Movement
Abdomen: Soft, No Tenderness
Neurological: Normal Speech
Extremities: No Edema, ulcer on left foot
 
Assessment/Plan
Assessment:
53 yo M with PMH of PVD, ESRD maintained on hemodialysis since 2000, HTN, 
hyperparathyroidism, CVA, seizures, gout, GERD, s/p left upper extremity finger 
amputation due to chronic osteomyelitis presented to ED with chief complaint of 
left foot pain and chronic left heel ulcer.
 
Assessment:
 
1. Left foot Ulcer
2. ? Left foot Cellulitis
3. History of ESRD on dialysis
4. History of PVD
 
Plan:
 
* s/p I&D today with Podiatry. 
* Would observe off antibiotics for now. He had a mildly elevated white count 
yesterday. 
* ID consult regarding antibiotics. 
* He can continue dialysis in the hospital. He gets it M/W/F. 
* Will follow OR cultures
* f/u blood cultures
* Lipid panel, TFTs, HbA1c - pending. 
* Continue Percocet for pain. 
* Continue PPI. 
* Diet: Renal Dialysis
* DVT Prophylaxis: SC Heparin x 3
* Code: Full Code
 
 
Problem List:
 1. Osteomyelitis
 
Pain Ratin
Pain Location:
none
Pain Goal: Remain pain free
Pain Plan:
none
Tomorrow's Labs & Rationales:
CBC, BEP
 
 
Mickey Montalvo 18 1220:
Attending MD Review Statement
 
Attending Statement
Attending MD Statement: examined this patient, discuss w/resident/PA/NP, agreed 
w/resident/PA/NP, discussed with family, reviewed EMR data (avail), discussed 
with nursing, discussed with case mgmt, reviewed images, amended to note
Attending Assessment/Plan:
Patient seen/examiend bedside. He is non coperative with history taking and 
physical exam. He is abusive to residents and medical team. 
 
Assessment and plan:
1. Left heel chronic nonhealing concerning for osteomyelitis
2. Left foot cellulitis
3. Peripheral vascular disease
4. ESRD on dialysis
5. Transient hypotension - resolved
 
 
- Panculture
- Podiatry consult
- OR for bone biopsy as per podairty
- Watch off antibiotics for now
- ID consult to help with antibiotics
- Nephro consult for dialysis
- f/u TSH, free T4, HbA1c, lipid panel.
 
DVT ppx Hep SC. Full code. Plan of care as per admitting physician.

## 2018-03-20 NOTE — OPERATIVE REPORT
Operative/Inv Procedure Report
Surgery Date: 03/20/18
Name of Procedure:
1 open incision and drainage deep to the fashion with exposure of the tendon and
tendon sheath multiple sites left heel
2 intraoperative administration of negative pressure wound therapy
3 intraoperative administration of ankle block anesthesia
4 excisional debridement
Pre-Operative Diagnosis:
1 open necrotic wound left heel
2 diabetic peripheral arterial disease
Post-Operative Diagnosis:
The same
Estimated Blood Loss: less than 50ml
Surgeon/Assistant:
DAIJA WHITE DPM
 
Anesthesia: moderate sedation, block
 
Operative/Procedure Note
Note:
After obtaining informed consent the patient was brought to the operating room 
and placed on the operating table in the supine position.  The patient isn't 
securely fastened to the operating table utilizing safety belt.  After 
administration of IV sedation, 10 mL of 0.5% Marcaine plain was obtained about 
the patient's left ankle.  The foot and ankle within scrubbed prepped and draped
in usual aseptic manner.  Attention directed posterior aspect the left heel, 
where a large full-thickness necrotic was identified.  A 15 blade visualized 
sharply revised skin margins.  Dissection carried down deep to the deep fascia 
with exposure of the flexor tendon and tendon sheath multiple sites left heel.  
All necrotic nonviable infected tissue sharply evacuated from the wound bed.  
Nipple was then irrigated 3 L of normal sterile saline infusion 50,000 units of 
bacitracin.  Following this foot was redraped and surgeon's top was changed 
clean gloves.  Any bleeding vessels identified were cauterized or ligated as 
encountered.  Negative pressure wound therapy was then placed followed by Mahesh Dixon.  The patient is noted tolerate both procedure and anesthesia well 
and the patient was transported from the operating room to recovery by sent 
stable

## 2018-03-20 NOTE — CONS- INFECT DISEASE
General Information and HPI
 
Consulting Request
Date of Consult: 03/20/18
Requested By:
Mickey Montalvo MD
 
Reason for Consult:
Rule out osteomyelitis of the left heel
Source of Information: patient, old records
History of Present Illness:
This is a 52-year-old man with a history of end-stage renal disease secondary to
analgesic abuse, maintained on hemodialysis Mondays, Wednesdays and Fridays for 
18 years via a left thigh fistula, hypertension, status post CVA, seizures, 
hyperparathyroidism, GI bleed and gout, with a nonhealing left heel ulcer for 
several weeks, admitted on March 19 with a one week history of throbbing pain 
and increasing erythema with no fevers or chills.  On admission he was afebrile.
 Laboratory data revealed a white blood cell count of 11,000, BUN/creatinine 25 
and 7.0, lactic acid 2.8, with normal liver enzymes, coags normal.  X-ray of the
left heel revealed a bony defect along the distal aspect of the calcaneus.  He 
was followed off antibiotics and has remained afebrile since admission.  He was 
taken to the OR earlier today for an excisional debridement of the necrotic left
heel wound, with no evidence of bony involvement.  At present he does not report
any pain in the left foot.
 
Allergies/Medications
Allergies:
Coded Allergies:
Iodinated Contrast- Oral and IV Dye (IODINATED CONTRAST MEDIA - IV DYE) (RASH 03
/19/18)
 
Home Med List:
Esomeprazole (Nexium) 40 MG CAPSULE.   1 CAP PO BID ACID REFLUX  (Reported)
 
 
Past History
 
Travel History
Traveled to [place] past 21 day No
 
Medical History
Blood Transfusion Hx: Yes
Neurological: CVA, migraine, seizure
EENT: NONE
Cardiovascular: hypertension
Respiratory: collapsed left lung with a persistent left pleural effusion.
Gastrointestinal: GERD, GI BLEED
Hepatic: NONE
Renal: ESRD on HD, DIALYSIS M-W-F ACCESS L LEG
Musculoskeletal: gout, osteoarthritis, secondary OSTEOPOROSIS GANGREEN L 2ND 
FINGER
Psychiatric: NONE
Endocrine: hyperparathyroidism
Blood Disorders: NONE
Cancer(s): NONE
GYN/Reproductive: NONE
Other Medical Hx:
In 2004 he had an infected abscess with MSSA bacteremia.
 Complete central venous occlusion in 2007.
History of MRSA: No
History of VRE: No
History of CDIFF: No
Isolation History: Standard
Influenza Vaccine: 09/01/17
 
Surgical History
Surgical History: GRAFT, LEFT THIGH s/p placement of numerous August catheters/also
dual-lumen Amor's left radial cephalic primary AVF on 03/28/2000 left upper 
arm AVG Tenckhoff catheter placement and subsequent removal, status post 
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
 
Family History
Relations & Conditions If Any:
MOTHER (Heart Dz, Breast Ca).
FATHER (Diabetes).
Relation not specified for:
  FH: stroke
 
 
Psychosocial History
Where Do You Live? Home
Who Do You Live With? self
Services at Home: Nursing
Primary Language: English
Smoking Status: Never Smoked
ETOH Use: denies use
Illicit Drug Use: denies illicit drug use
 
Functional Ability
ADLs
Independent: dressing, eating. 
Ambulation: cane
IADLs
Independent: shopping, housework, finances. 
 
Review of Systems
 
Review of Systems
All Other Systems: Reviewed and Negative
 
Exam & Diagnostic Data
Last 24 Hrs of Vital Signs/I&O
 Vital Signs
 
 
Date Time Temp Pulse Resp B/P B/P Pulse O2 O2 Flow FiO2
 
     Mean Ox Delivery Rate 
 
03/20 0729 97.6 84 20 103/72  96 Room Air  
 
03/19 2233 97.6 94 18 103/66  99 Room Air  
 
03/19 2041 96.8 93 18 92/50  100 Room Air  
 
03/19 1732 97.6 101 18 102/70  92 Room Air  
 
 
 Intake & Output
 
 
 03/20 1600 03/20 0800 03/20 0000
 
Intake Total  200 
 
Output Total   
 
Balance  200 
 
    
 
Intake, IV  200 
 
Patient  193 lb 185 lb
 
Weight   
 
Weight  Bed scale Reported by Patient
 
Measurement   
 
Method   
 
 
 
 
Physical Exam
Other Physical Findings:
Afebrile.  He is awake and alert in no acute distress.  Skin reveals no rash.  
HEENT negative.  Neck is supple with no adenopathy.  Lungs decreased breath 
sounds at the left base.  Heart regular rhythm with no murmur.  Abdomen is soft,
nontender with positive bowel sounds.  Back scar in the left upper back; no CVA 
tenderness.  Extremities left foot dressing intact, with wound VAC in place; 
both feet cool to touch, with decreased pulses and with cyanotic changes; left 
thigh fistula with a positive bruit and thrill.  Neuro is without focality.
 
Last 24 Hours of Lab Results:
 Laboratory Tests
 
 
 03/20 03/19 03/19
 
 0600 2100 1852
 
Chemistry   
 
  Sodium (137 - 145 mmol/L) Cancelled  144
 
  Potassium (3.5 - 5.1 mmol/L) Cancelled  4.5
 
  Chloride (98 - 107 mmol/L) Cancelled  86  L
 
  Carbon Dioxide (22 - 30 mmol/L) Cancelled  30
 
  Anion Gap (5 - 16) Cancelled  27  H
 
  BUN (9 - 20 mg/dL) Cancelled  25  H
 
  Creatinine (0.7 - 1.2 mg/dL) Cancelled  7.0 *H
 
  Estimated GFR (>60 ml/min)   8  L
 
  BUN/Creatinine Ratio (7 - 25 %) Cancelled  3.6  L
 
  Glucose (65 - 99 mg/dL)   83
 
  Hemoglobin A1c (4.2 - 5.8 %)   Pending
 
  Lactic Acid (0.7 - 2.1 mmol/L)  1.7 2.8  H
 
  Calcium (8.4 - 10.2 mg/dL)   8.6
 
  Total Bilirubin (0.2 - 1.3 mg/dL)   0.8
 
  AST (17 - 59 U/L)   17
 
  ALT (21 - 72 U/L)   14  L
 
  Alkaline Phosphatase (< 127 U/L)   84
 
  Troponin I (<0.11 ng/ml)   0.02
 
  C-Reactive Prot, Quant (<1.0 mg/dL)   4.7  H
 
  Total Protein (6.3 - 8.2 g/dL)   9.1  H
 
  Albumin (3.5 - 5.0 g/dL)   4.8
 
  Globulin (1.9 - 4.2 gm/dL)   4.3  H
 
  Albumin/Globulin Ratio (1.1 - 2.2 %)   1.1
 
  Triglycerides (<150 mg/dL)   282  H
 
  Cholesterol (< 200 MG/DL)   235  H
 
  LDL Cholesterol, Calc (65 - 129 mg/dL)   144  H
 
  HDL Cholesterol (40 - 60 mg/dL)   35  L
 
  Cholesterol/HDL Ratio (0.00 - 4.88 %)   7  H
 
  TSH (0.270 - 4.200 uIU/mL)   Pending
 
  Free T4 (0.64 - 1.79 ng/dL)   1.72
 
Coagulation   
 
  PT (9.4 - 12.5 SEC)   11.7
 
  INR (0.90 - 1.17)   1.07
 
  APTT (25 - 37 SEC)   36
 
Hematology   
 
  CBC w Diff Cancelled  NO MAN DIFF REQ
 
  WBC (4.8 - 10.8 /CUMM) Cancelled  11.2  H
 
  RBC (4.70 - 6.10 /CUMM) Cancelled  5.84
 
  Hgb (14.0 - 18.0 G/DL) Cancelled  15.5
 
  Hct (42 - 52 %) Cancelled  48.8
 
  MCV (80.0 - 94.0 FL) Cancelled  83.6
 
  MCH (27.0 - 31.0 PG) Cancelled  26.5  L
 
  MCHC (33.0 - 37.0 G/DL) Cancelled  31.7  L
 
  RDW (11.5 - 14.5 %) Cancelled  21.7  H
 
  Plt Count (130 - 400 /CUMM) Cancelled  256
 
  MPV (7.4 - 10.4 FL) Cancelled  10.3
 
  Gran % (42.2 - 75.2 %)   80.0  H
 
  Lymphocytes % (20.5 - 51.1 %)   8.2  L
 
  Monocytes % (1.7 - 9.3 %)   9.9  H
 
  Eosinophils % (0 - 5 %)   1.4
 
  Basophils % (0.0 - 2.0 %)   0.5
 
  Absolute Granulocytes (1.4 - 6.5 /CUMM)   9.0  H
 
  Absolute Lymphocytes (1.2 - 3.4 /CUMM)   0.9  L
 
  Absolute Monocytes (0.10 - 0.60 /CUMM)   1.1  H
 
  Absolute Eosinophils (0.0 - 0.7 /CUMM)   0.2
 
  Absolute Basophils (0.0 - 0.2 /CUMM)   0.1
 
  ESR Westergren (0 - 10 MM)   21  H
 
 
 
Last 24 Hours of Arnold Results:
Blood cultures 2 March 19 negative
 
 
Diagnostic Data
Recent Imaging Findings:
X-ray of the left heel March 19 reveals a bony defect along the distal aspect of
the calcaneus
 
 
Assessment/Plan
 
Assessment/Plan
Impression:
This is a 52-year-old man with a history of end-stage renal disease secondary to
analgesic abuse, maintained on hemodialysis Mondays, Wednesdays and Fridays for 
18 years via a left thigh fistula, with a nonhealing left heel ulcer for several
weeks, admitted on March 19 with a one week history of throbbing pain and 
increasing erythema, found to be afebrile with a mild leukocytosis now status 
post debridement of necrotic soft tissue of the left heel with no evidence of 
extension to bone. 
 
The nonhealing ulcer does raise concern for possible underlying osteomyelitis, 
though the intraoperative findings did not suggest this.  His x-ray did reveal a
bony defect along the distal aspect of the calcaneus, raising the possibility of
a focal osteomyelitis; therefore an MRI could be considered.  He has been 
afebrile with only a mild leukocytosis; therefore the role of antibiotics for 
even a soft tissue infection is unclear but, if there are no plans to pursue the
diagnosis of osteomyelitis, he could be treated with a short course of 
antibiotics.  His underlying vascular status is quite poor and he would benefit 
from vascular surgery evaluation.
 
Suggestion:
1.  Vascular surgery evaluation 
2.  Consider MRI of the left foot
3.  Continue to follow off antibiotics pending above
 
 
 
Consult Acknowledgment
- Thank you for your consult request.

## 2018-03-20 NOTE — ADMISSION CERTIFICATION
Admission Certification
 
Certification Statement
- As attending physician, I certify that at the time of
- admission, based on clinical presentation, severity of
- symptoms, need for further diagnostic testing and
- therapeutic interventions, and risk of adverse outcomes
- without in-hospital treatment, in my clinical assessment,
- this patient requires an acute hospital stay for a minimum
- of two nights or longer. I have also considered psychsocial
- factors such as support system, advanced age, financial
- issues, cognitive issues, and failed out-patient treatments,
- past re-admission history, safety of patient, and lack of
- compliance as applicable.
Specific rationale supporting this admission is:
Left heel nonhealing wound concerning for osteomyelitis, needs Podiatry consult 
and bone biopsy followed by antibiotics.

## 2018-03-21 VITALS — SYSTOLIC BLOOD PRESSURE: 110 MMHG | DIASTOLIC BLOOD PRESSURE: 60 MMHG

## 2018-03-21 VITALS — DIASTOLIC BLOOD PRESSURE: 54 MMHG | SYSTOLIC BLOOD PRESSURE: 98 MMHG

## 2018-03-21 VITALS — DIASTOLIC BLOOD PRESSURE: 58 MMHG | SYSTOLIC BLOOD PRESSURE: 100 MMHG

## 2018-03-21 LAB
ABSOLUTE GRANULOCYTE CT: 6 /CUMM (ref 1.4–6.5)
BASOPHILS # BLD: 0 /CUMM (ref 0–0.2)
BASOPHILS NFR BLD: 0.5 % (ref 0–2)
EOSINOPHIL # BLD: 0.1 /CUMM (ref 0–0.7)
EOSINOPHIL NFR BLD: 1.1 % (ref 0–5)
ERYTHROCYTE [DISTWIDTH] IN BLOOD BY AUTOMATED COUNT: 21.4 % (ref 11.5–14.5)
GRANULOCYTES NFR BLD: 70.8 % (ref 42.2–75.2)
HCT VFR BLD CALC: 39.8 % (ref 42–52)
LYMPHOCYTES # BLD: 1 /CUMM (ref 1.2–3.4)
MCH RBC QN AUTO: 26.8 PG (ref 27–31)
MCHC RBC AUTO-ENTMCNC: 32.1 G/DL (ref 33–37)
MCV RBC AUTO: 83.7 FL (ref 80–94)
MONOCYTES # BLD: 1.3 /CUMM (ref 0.1–0.6)
PLATELET # BLD: 208 /CUMM (ref 130–400)
PMV BLD AUTO: 10.5 FL (ref 7.4–10.4)
RED BLOOD CELL CT: 4.76 /CUMM (ref 4.7–6.1)
WBC # BLD AUTO: 8.5 /CUMM (ref 4.8–10.8)

## 2018-03-21 NOTE — PN- HOUSESTAFF
Jovan BEDOLLA,[facility] 18 0729:
Subjective
Follow-up For:
Foot ulcer
?Osteomyelitis
Subjective:
Patient was seen and examined at bedside. he states he didn't get much sleep 
last night as his pain kept bothering all night. He got a Percocet which seemed 
to have worsened his pain. 
 
Review of Systems
Constitutional:
Reports: no symptoms. 
 
Objective
Last 24 Hrs of Vital Signs/I&O
 Vital Signs
 
 
Date Time Temp Pulse Resp B/P B/P Pulse O2 O2 Flow FiO2
 
     Mean Ox Delivery Rate 
 
 0643 98.0 94 20 98/54  94 Room Air  
 
 2323 98.3 102 18 101/60  92 Room Air  
 
 1713    97/60     
 
 
 Intake & Output
 
 
  1600  0800  0000
 
Intake Total  480 200
 
Output Total   
 
Balance  480 200
 
    
 
Intake, Oral  480 200
 
Patient  205 lb 
 
Weight   
 
Weight  Bed scale 
 
Measurement   
 
Method   
 
 
 
 
Physical Exam
General Appearance: Alert, Oriented X3, Cooperative, No Acute Distress
Skin: No Rashes, No Breakdown
Skin Temp/Moisture Exam: Warm/Dry
Sepsis Skin Exam (color): Normal for Ethnicity
HEENT: Atraumatic
Cardiovascular: Normal S1, Normal S2, No Murmurs
Lungs: Clear to Auscultation, Normal Air Movement
Abdomen: Soft, No Tenderness
Neurological: Normal Speech
Extremities: Left foot in ace wraps and wound vac
 
Assessment/Plan
Assessment:
51 yo M with PMH of PVD, ESRD maintained on hemodialysis since 2000, HTN, 
hyperparathyroidism, CVA, seizures, gout, GERD, s/p left upper extremity finger 
amputation due to chronic osteomyelitis presented to ED with chief complaint of 
left foot pain and chronic left heel ulcer.
 
Assessment:
 
1. Left foot Ulcer
2. ? Left foot Cellulitis
3. History of ESRD on dialysis
4. History of PVD
 
Plan:
 
* s/p I&D yesterday with placement of a wound vac. 
* Per ID would wait starting antibiotics until patient has had the MRI. 
* He can continue dialysis in the hospital. Neprology has been consulted. 
Patient won't be able to receive dialysis due to placement problems. He will 
have it tomorrow. 
* Patient states that he receives dialysis at  [facility] [place]. Dr Wolff in [place] his dialysis/renal issues per patient. 
* His blood cultures 1/2 bottles is growing gram positive eleonora. 
* Lipid panel suggestive of hyperlipidemia
* TFTs and HbA1c is normal. 
* Continue Oxycontin 15mg BID and Oxycodone 5mg for severe pain. 
* Continue PPI. 
* Diet: Renal Dialysis
* DVT Prophylaxis: SC Heparin x 3
* Code: Full Code
 
Problem List:
 1. Osteomyelitis
 
Pain Ratin
Pain Location:
none
Pain Goal: Remain pain free
Pain Plan:
none
Tomorrow's Labs & Rationales:
CBC, BEP
 
 
Mickey Montalvo 18 1110:
Attending MD Review Statement
 
Attending Statement
Attending MD Statement: examined this patient, discuss w/resident/PA/NP, agreed 
w/resident/PA/NP, discussed with family, reviewed EMR data (avail), discussed 
with nursing, discussed with case mgmt, reviewed images, amended to note
Attending Assessment/Plan:
Patient is little coperative today while interviewing. Patient is s/p 
debridement with podiatry for possible OM of left foot. MRI pending. ID and 
podiatry consulted. Abx as per ID. Patient has wound vac now. He has ESRD on 
hemodialysis follows nephorlogy as outpatient. Nephrology in house called and 
plan for dialysis as per nephro. Confirm his home meds with pharmacy.

## 2018-03-21 NOTE — EVENT NOTE
Event Note
Event Note:
I was paged that patients blood culture 1 out of 2 bottles positive for gram-
positive rods.  Case were discussed with my attending.  Possible contamination 
as patient is not spiking fever and will follow his WBC count.  We will wait for
final blood culture results.

## 2018-03-21 NOTE — PN- INFECT DX
Subjective
Subjective:
Afebrile.  He notes some discomfort in the left foot.
 
Objective
Last 24 Hrs of Vital Signs/I&O
 Vital Signs
 
 
Date Time Temp Pulse Resp B/P B/P Pulse O2 O2 Flow FiO2
 
     Mean Ox Delivery Rate 
 
03/21 0643 98.0 94 20 98/54  94 Room Air  
 
03/20 2323 98.3 102 18 101/60  92 Room Air  
 
03/20 1713    97/60     
 
 
 Intake & Output
 
 
 03/21 1600 03/21 0800 03/21 0000
 
Intake Total  480 200
 
Output Total   
 
Balance  480 200
 
    
 
Intake, Oral  480 200
 
Patient  205 lb 
 
Weight   
 
Weight  Bed scale 
 
Measurement   
 
Method   
 
 
 
 
Physical Exam
Other Physical Findings:
He appears comfortable in no acute distress
Extremities left foot dressing intact, with wound VAC in place
 
 
Results
Last 24 Hours of Lab Results:
No labs from today
 
Last 24 Hours of Arnold Results:
Blood cultures March 19 one bottle positive for gram-positive rods
 
 
Assessment/Plan ID
Impression:
Stable, with temperatures and white blood cell count remaining normal, off 
antibiotics status post I&D of a left heel ulcer, with no evidence for extension
to the bone.  The x-ray, however, did suggest possible osteomyelitis, and he is 
scheduled for an MRI later today. If the MRI is negative he can then be covered 
with empiric antibiotics for a presumed soft tissue infection.  The positive 
blood culture for gram-positive rods likely represents a contaminant and should 
not require treatment.
 
Suggestion:
1.  Await MRI of the left foot
2.  Vascular surgery evaluation as recommended by Podiatry
3.  If the MRI is negative would begin Unasyn 1.5 g IV every 12 hours

## 2018-03-21 NOTE — ULTRASOUND REPORT
EXAMINATION:
COLOR-FLOW DUPLEX IMAGING OF THE BILATERAL LOWER EXTREMITY ARTERIAL SYSTEM.
VELOCITY MEASUREMENTS THROUGHOUT THE FEMORAL ARTERIES.
 
CLINICAL INFORMATION:
Foot ulcers, left. Lower extremity claudication.
 
COMPARISON:
Ultrasound imaging of left thigh graft 12/02/2015 (report only).
 
FINDINGS:
Scattered atherosclerotic disease throughout the deep arteries of the
bilateral lower extremities.
 
RIGHT FEMORAL RUNOFF VELOCITIES:
The right common femoral artery measures 66cm/s and is monophasic.
The right profunda femoral artery measures 80cm/s and is monophasic.
Right proximal superficial femoral artery measures 57 cm/s and is biphasic.
Mid superficial femoral artery measures 49cm/s and is biphasic.
Distal right superficial femoral artery measures 44cm/s and is biphasic.
Right popliteal velocity measures 49cm/s and is monophasic.
Posterior tibial velocity is 11cm/s and flow is triphasic.
Anterior tibial velocity is 66cm/s and flow is monophasic.
Dorsal pedis velocity is 46cm/s and flow is monophasic.
 
 
LEFT FEMORAL RUNOFF VELOCITIES:
The left common femoral artery measures 77cm/s and demonstrates turbulent
flow.
The left profunda femoral artery measures 66cm/s and is monophasic.
Left proximal superficial femoral artery measures 101 cm/s and is monophasic.
Mid superficial femoral artery measures 44cm/s and is monophasic.
Distal left superficial femoral artery measures 40cm/s and is monophasic.
Left popliteal velocity measures 79cm/s and is monophasic.
Posterior tibial velocity is 193cm/s and flow is monophasic.
Bandages of the right foot obscured visualization of the dorsal pedis artery.
 
There is a loop graft within the left thigh. The arterial anastomosis of the
loop graft is widely patent. The loop graft extends superficially and has
turbulent flow with a velocity of 226 cm/s. Visualized portions of the loop
graft appear widely patent, however, the venous anastomosis was not clearly
visualized.
 
 
 
IMPRESSION:
1.  Diffuse primarily monophasic waveforms throughout the deep arterial
system of both lower extremities is consistent with bilateral inflow disease,
left slightly worse than right.
2.  Elevated velocities within the left posterior tibial artery suggests
focal stenosis.
3.  Visualized portions of the loop graft within the left thigh are widely
patent. The venous anastomosis was not clearly visualized. Recommend
correlation with physical examination.

## 2018-03-21 NOTE — CONS- NEPHROLOGY
General Information and HPI
 
Consulting Request
Date of Consult: 03/21/18
Requested By:
Mickey Montalvo MD
 
History of Present Illness:
Mr. Pérez is a 51 yo M with ESRD on dialysis since 2000. He is currently 
dialyzed out of the [facility] dialysis unit but comes to [facility] for his 
hospital care. He came to the ER Monday night with foot pain and was brought to 
the OR yesterday for debridement of the heal with and placement of a wound vac. 
1/2 blood cultures grew GPR and he is being seen by Dr. Nguyen with this 
finding. He was last dialyzed on Monday. 
 
Allergies/Medications
Allergies:
Coded Allergies:
Iodinated Contrast- Oral and IV Dye (IODINATED CONTRAST MEDIA - IV DYE) (RASH 03
/19/18)
 
Home Med List:
Esomeprazole (Nexium) 40 MG CAPSULE.   1 CAP PO BID ACID REFLUX  (Reported)
 
 
Review of Systems
Review of Systems:
As in HPI. Foot pain, no fevers, chills
Other systems negative. 
 
Past History
 
Travel History
Traveled to [place] past 21 day No
 
Medical History
Blood Transfusion Hx: Yes
Neurological: CVA, migraine, seizure
EENT: NONE
Cardiovascular: hypertension
Respiratory: collapsed left lung with a persistent left pleural effusion.
Gastrointestinal: GERD, GI BLEED
Hepatic: NONE
Renal: ESRD on HD, DIALYSIS M-W-F ACCESS L LEG
Musculoskeletal: gout, osteoarthritis, secondary OSTEOPOROSIS GANGREEN L 2ND 
FINGER
Psychiatric: NONE
Endocrine: hyperparathyroidism
Blood Disorders: NONE
Cancer(s): NONE
GYN/Reproductive: NONE
Other Medical Hx:
In 2004 he had an infected abscess with MSSA bacteremia.
 Complete central venous occlusion in 2007.
 
Surgical History
Surgical History: GRAFT, LEFT THIGH s/p placement of numerous August catheters/also
dual-lumen Amor's left radial cephalic primary AVF on 03/28/2000 left upper 
arm AVG Tenckhoff catheter placement and subsequent removal status post 
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
 
Family History
Relations & Conditions If Any:
MOTHER (Heart Dz, Breast Ca).
FATHER (Diabetes).
Relation not specified for:
  FH: stroke
 
 
Psychosocial History
Where Do You Live? Home
Who Do You Live With? self
Services at Home: Nursing
Primary Language: English
Smoking Status: Never Smoked
ETOH Use: denies use
Illicit Drug Use: denies illicit drug use
 
Functional Ability
ADLs
Independent: dressing, eating. 
Ambulation: cane
IADLs
Independent: shopping, housework, finances. 
 
Exam & Diagnostic Data
Vital Signs and I&O
M NAD
98/54   98  94
Skin neg rash
Eyes anicteric
ENT moist
Lungs clear
Cor RRR
Abd soft
Ext foot bandaged with wound vac
Neuro nonfocal.
 
Results
Pertinent Lab Results:
 
 
144 / 86 / 25 /
4.5  / 30 / 7.0\ 
 
Hg 15 WBC 11.2
 
Assessment/Plan
 
Assessment/Recommendations
Assessment:
ESRD.  s/p debridement of heel.    Electrolytes and intravascular volume are 
okay.  I will arrange inpatient dialysis here in the hospital.  Will likely plan
dialysis tomorrow instead of today (scheduling reasons) the return to regular 
treatment Friday (Thur/Fr this week then MWF next week).
 
Thanks will follow.
Fidel Horner MD.
Recommendations:
.

## 2018-03-22 VITALS — DIASTOLIC BLOOD PRESSURE: 62 MMHG | SYSTOLIC BLOOD PRESSURE: 98 MMHG

## 2018-03-22 VITALS — SYSTOLIC BLOOD PRESSURE: 90 MMHG | DIASTOLIC BLOOD PRESSURE: 60 MMHG

## 2018-03-22 LAB
ABSOLUTE GRANULOCYTE CT: 5.5 /CUMM (ref 1.4–6.5)
ABSOLUTE GRANULOCYTE CT: 6.3 /CUMM (ref 1.4–6.5)
BASOPHILS # BLD: 0 /CUMM (ref 0–0.2)
BASOPHILS # BLD: 0 /CUMM (ref 0–0.2)
BASOPHILS NFR BLD: 0.2 % (ref 0–2)
BASOPHILS NFR BLD: 0.4 % (ref 0–2)
EOSINOPHIL # BLD: 0.1 /CUMM (ref 0–0.7)
EOSINOPHIL # BLD: 0.1 /CUMM (ref 0–0.7)
EOSINOPHIL NFR BLD: 1.1 % (ref 0–5)
EOSINOPHIL NFR BLD: 1.8 % (ref 0–5)
ERYTHROCYTE [DISTWIDTH] IN BLOOD BY AUTOMATED COUNT: 21.2 % (ref 11.5–14.5)
ERYTHROCYTE [DISTWIDTH] IN BLOOD BY AUTOMATED COUNT: 21.8 % (ref 11.5–14.5)
GRANULOCYTES NFR BLD: 67.6 % (ref 42.2–75.2)
GRANULOCYTES NFR BLD: 76 % (ref 42.2–75.2)
HCT VFR BLD CALC: 38.7 % (ref 42–52)
HCT VFR BLD CALC: 40.6 % (ref 42–52)
LYMPHOCYTES # BLD: 0.7 /CUMM (ref 1.2–3.4)
LYMPHOCYTES # BLD: 1.1 /CUMM (ref 1.2–3.4)
MCH RBC QN AUTO: 26.8 PG (ref 27–31)
MCH RBC QN AUTO: 26.9 PG (ref 27–31)
MCHC RBC AUTO-ENTMCNC: 32 G/DL (ref 33–37)
MCHC RBC AUTO-ENTMCNC: 32.3 G/DL (ref 33–37)
MCV RBC AUTO: 83 FL (ref 80–94)
MCV RBC AUTO: 84.1 FL (ref 80–94)
MONOCYTES # BLD: 1.1 /CUMM (ref 0.1–0.6)
MONOCYTES # BLD: 1.3 /CUMM (ref 0.1–0.6)
PLATELET # BLD: 200 /CUMM (ref 130–400)
PLATELET # BLD: 209 /CUMM (ref 130–400)
PMV BLD AUTO: 10.5 FL (ref 7.4–10.4)
PMV BLD AUTO: 10.6 FL (ref 7.4–10.4)
RED BLOOD CELL CT: 4.66 /CUMM (ref 4.7–6.1)
RED BLOOD CELL CT: 4.83 /CUMM (ref 4.7–6.1)
WBC # BLD AUTO: 8.1 /CUMM (ref 4.8–10.8)
WBC # BLD AUTO: 8.3 /CUMM (ref 4.8–10.8)

## 2018-03-22 NOTE — PN- INFECT DX
Subjective
Subjective:
Afebrile without complaints.  He does have pain in the left foot with elevation.
 
 
Objective
Last 24 Hrs of Vital Signs/I&O
 Vital Signs
 
 
Date Time Temp Pulse Resp B/P B/P Pulse O2 O2 Flow FiO2
 
     Mean Ox Delivery Rate 
 
03/22 0721 97.9 82 20 98/62  96   
 
03/21 2210 98.0 80 18 110/60  94 Room Air  
 
 
 Intake & Output
 
 
 03/22 1600 03/22 0800 03/22 0000
 
Intake Total  240 240
 
Output Total   
 
Balance  240 240
 
    
 
Intake, Oral  240 240
 
Patient 205 lb  
 
Weight   
 
 
 
 
Physical Exam
Other Physical Findings:
He appears comfortable in no acute distress
Extremities left foot wound VAC in place with no surrounding erythema or 
tenderness
 
Results
Last 24 Hours of Lab Results:
 Laboratory Tests
 
 
 03/22 03/22 03/22
 
 1200 0830 0756
 
Chemistry   
 
  Sodium (137 - 145 mmol/L)  140 
 
  Potassium (3.5 - 5.1 mmol/L)  5.3  H 
 
  Chloride (98 - 107 mmol/L)  93  L 
 
  Carbon Dioxide (22 - 30 mmol/L)  22 
 
  Anion Gap (5 - 16)  25  H 
 
  BUN (9 - 20 mg/dL) 14 58  H 
 
  Creatinine (0.7 - 1.2 mg/dL)  12.3 *H 
 
  Estimated GFR (>60 ml/min)  4  L 
 
  BUN/Creatinine Ratio (7 - 25 %)  4.7  L 
 
  Calcium (8.4 - 10.2 mg/dL)  7.1  L 
 
  Phosphorus (2.5 - 4.5 mg/dL)  8.0  H 
 
  Magnesium (1.6 - 2.3 mg/dL)  1.8 
 
  Albumin (3.5 - 5.0 g/dL)  3.8 
 
Hematology   
 
  CBC w Diff  NO MAN DIFF REQ 
 
  WBC (4.8 - 10.8 /CUMM)  8.3 
 
  RBC (4.70 - 6.10 /CUMM)  4.66  L 
 
  Hgb (14.0 - 18.0 G/DL)  12.5  L 
 
  Hct (42 - 52 %)  38.7  L 
 
  MCV (80.0 - 94.0 FL)  83.0 
 
  MCH (27.0 - 31.0 PG)  26.8  L 
 
  MCHC (33.0 - 37.0 G/DL)  32.3  L 
 
  RDW (11.5 - 14.5 %)  21.2  H 
 
  Plt Count (130 - 400 /CUMM)  200 
 
  MPV (7.4 - 10.4 FL)  10.5  H 
 
  Gran % (42.2 - 75.2 %)  76.0  H 
 
  Lymphocytes % (20.5 - 51.1 %)  9.0  L 
 
  Monocytes % (1.7 - 9.3 %)  13.5  H 
 
  Eosinophils % (0 - 5 %)  1.1 
 
  Basophils % (0.0 - 2.0 %)  0.4 
 
  Absolute Granulocytes (1.4 - 6.5 /CUMM)  6.3 
 
  Absolute Lymphocytes (1.2 - 3.4 /CUMM)  0.7  L 
 
  Absolute Monocytes (0.10 - 0.60 /CUMM)  1.1  H 
 
  Absolute Eosinophils (0.0 - 0.7 /CUMM)  0.1 
 
  Absolute Basophils (0.0 - 0.2 /CUMM)  0 
 
Serology   
 
  Hep Bs Antigen (NONREACTIVE)  NONREACTIVE Cancelled
 
  Hep Bs Antibody (NONREACTIVE)  REACTIVE Cancelled
 
 
 
 
 03/22
 
 0624
 
Chemistry 
 
  Sodium (137 - 145 mmol/L) 142
 
  Potassium (3.5 - 5.1 mmol/L) 5.8  H
 
  Chloride (98 - 107 mmol/L) 93  L
 
  Carbon Dioxide (22 - 30 mmol/L) 23
 
  Anion Gap (5 - 16) 26  H
 
  BUN (9 - 20 mg/dL) 56  H
 
  Creatinine (0.7 - 1.2 mg/dL) 12.5 *H
 
  Estimated GFR (>60 ml/min) 4  L
 
  BUN/Creatinine Ratio (7 - 25 %) 4.5  L
 
Hematology 
 
  CBC w Diff NO MAN DIFF REQ
 
  WBC (4.8 - 10.8 /CUMM) 8.1
 
  RBC (4.70 - 6.10 /CUMM) 4.83
 
  Hgb (14.0 - 18.0 G/DL) 13.0  L
 
  Hct (42 - 52 %) 40.6  L
 
  MCV (80.0 - 94.0 FL) 84.1
 
  MCH (27.0 - 31.0 PG) 26.9  L
 
  MCHC (33.0 - 37.0 G/DL) 32.0  L
 
  RDW (11.5 - 14.5 %) 21.8  H
 
  Plt Count (130 - 400 /CUMM) 209
 
  MPV (7.4 - 10.4 FL) 10.6  H
 
  Gran % (42.2 - 75.2 %) 67.6
 
  Lymphocytes % (20.5 - 51.1 %) 13.8  L
 
  Monocytes % (1.7 - 9.3 %) 16.6  H
 
  Eosinophils % (0 - 5 %) 1.8
 
  Basophils % (0.0 - 2.0 %) 0.2
 
  Absolute Granulocytes (1.4 - 6.5 /CUMM) 5.5
 
  Absolute Lymphocytes (1.2 - 3.4 /CUMM) 1.1  L
 
  Absolute Monocytes (0.10 - 0.60 /CUMM) 1.3  H
 
  Absolute Eosinophils (0.0 - 0.7 /CUMM) 0.1
 
  Absolute Basophils (0.0 - 0.2 /CUMM) 0
 
 
 
Last 24 Hours of Arnold Results:
Blood cultures March 19 one set positive for diphtheroids
 
Recent Imaging Studies:
Arterial Dopplers both lower extremities March 21 reveal bilateral inflow 
disease, left worse than right, and focal stenosis within the left posterior 
tibial artery
 
 
Assessment/Plan ID
Impression:
Stable, with temperatures and white blood cell count remaining normal, off 
antibiotics status post I&D of a left heel ulcer 2 days ago, with no evidence 
for extension to the bone. The x-ray did suggest the possibility of 
osteomyelitis but, unfortunately, he was not able to tolerate the MRI.  As he 
remains afebrile with a normal white blood cell count and has no evidence for 
any cellulitis feel that he can be followed off antibiotics at this point.  His 
arterial Dopplers reveal peripheral vascular disease and he is scheduled for an 
angiogram of the left lower extremity in the a.m.  The positive blood cultures 
for diphtheroids presumably represent contaminants and do not require treatment.
 
 
Suggestion:
1.  Await left leg angiogram in the a.m.
2.  Continue to follow off antibiotics

## 2018-03-22 NOTE — EVENT NOTE
Event Note
Event Note:
I was paged that patients that second blood culture came back positive with gram
-positive rods.  This was discussed with my resident and attending.  Probably 
due to contamination as patient is not spiking fever and his WBC count is within
normal limits.

## 2018-03-22 NOTE — PN- NEPHROLOGY
Assessment/Plan Nephrology
Assessment:
ESRD on dialysis now.  Plan 2L UF as tolerated. dialysis again tomorrow.
Fidel Horner MD
Suggestion:
.
 
 
Subjective
Subjective:
Pt on dialysis now. Comfortable.   
 
Objective
Vital Signs and I&Os
M NAD
98/62  97   
Lungs clear
Cor RRR
Abd soft
Ext tr edema
 
 
 
Results
Pertinent Lab Results:
 
 
 Laboratory Tests
 
 
 03/22 03/22
 
 0830 0756
 
Chemistry  
 
  Sodium (137 - 145 mmol/L) 140 
 
  Potassium (3.5 - 5.1 mmol/L) 5.3  H 
 
  Chloride (98 - 107 mmol/L) 93  L 
 
  Carbon Dioxide (22 - 30 mmol/L) 22 
 
  Anion Gap (5 - 16) 25  H 
 
  BUN (9 - 20 mg/dL) 58  H 
 
  Creatinine (0.7 - 1.2 mg/dL) 12.3 *H 
 
  Estimated GFR (>60 ml/min) 4  L 
 
  BUN/Creatinine Ratio (7 - 25 %) 4.7  L 
 
  Calcium (8.4 - 10.2 mg/dL) 7.1  L 
 
  Phosphorus (2.5 - 4.5 mg/dL) 8.0  H 
 
  Magnesium (1.6 - 2.3 mg/dL) 1.8 
 
  Albumin (3.5 - 5.0 g/dL) 3.8 
 
Hematology  
 
  CBC w Diff NO MAN DIFF REQ 
 
  WBC (4.8 - 10.8 /CUMM) 8.3 
 
  RBC (4.70 - 6.10 /CUMM) 4.66  L 
 
  Hgb (14.0 - 18.0 G/DL) 12.5  L 
 
  Hct (42 - 52 %) 38.7  L 
 
  MCV (80.0 - 94.0 FL) 83.0 
 
  MCH (27.0 - 31.0 PG) 26.8  L 
 
  MCHC (33.0 - 37.0 G/DL) 32.3  L 
 
  RDW (11.5 - 14.5 %) 21.2  H 
 
  Plt Count (130 - 400 /CUMM) 200 
 
  MPV (7.4 - 10.4 FL) 10.5  H 
 
  Gran % (42.2 - 75.2 %) 76.0  H 
 
  Lymphocytes % (20.5 - 51.1 %) 9.0  L 
 
  Monocytes % (1.7 - 9.3 %) 13.5  H 
 
  Eosinophils % (0 - 5 %) 1.1 
 
  Basophils % (0.0 - 2.0 %) 0.4 
 
  Absolute Granulocytes (1.4 - 6.5 /CUMM) 6.3 
 
  Absolute Lymphocytes (1.2 - 3.4 /CUMM) 0.7  L 
 
  Absolute Monocytes (0.10 - 0.60 /CUMM) 1.1  H 
 
  Absolute Eosinophils (0.0 - 0.7 /CUMM) 0.1 
 
  Absolute Basophils (0.0 - 0.2 /CUMM) 0 
 
Serology  
 
  Hep Bs Antigen (NONREACTIVE) NONREACTIVE Cancelled
 
  Hep Bs Antibody (NONREACTIVE) REACTIVE Cancelled
 
 
 
 
 03/22 03/21
 
 0624 1446
 
Chemistry  
 
  Sodium (137 - 145 mmol/L) 142 140
 
  Potassium (3.5 - 5.1 mmol/L) 5.8  H 4.8
 
  Chloride (98 - 107 mmol/L) 93  L 93  L
 
  Carbon Dioxide (22 - 30 mmol/L) 23 24
 
  Anion Gap (5 - 16) 26  H 23  H
 
  BUN (9 - 20 mg/dL) 56  H 49  H
 
  Creatinine (0.7 - 1.2 mg/dL) 12.5 *H 10.9 *H
 
  Estimated GFR (>60 ml/min) 4  L 5  L
 
  BUN/Creatinine Ratio (7 - 25 %) 4.5  L 4.5  L
 
Hematology  
 
  CBC w Diff NO MAN DIFF REQ NO MAN DIFF REQ
 
  WBC (4.8 - 10.8 /CUMM) 8.1 8.5
 
  RBC (4.70 - 6.10 /CUMM) 4.83 4.76
 
  Hgb (14.0 - 18.0 G/DL) 13.0  L 12.8  L
 
  Hct (42 - 52 %) 40.6  L 39.8  L
 
  MCV (80.0 - 94.0 FL) 84.1 83.7
 
  MCH (27.0 - 31.0 PG) 26.9  L 26.8  L
 
  MCHC (33.0 - 37.0 G/DL) 32.0  L 32.1  L
 
  RDW (11.5 - 14.5 %) 21.8  H 21.4  H
 
  Plt Count (130 - 400 /CUMM) 209 208
 
  MPV (7.4 - 10.4 FL) 10.6  H 10.5  H
 
  Gran % (42.2 - 75.2 %) 67.6 70.8
 
  Lymphocytes % (20.5 - 51.1 %) 13.8  L 12.0  L
 
  Monocytes % (1.7 - 9.3 %) 16.6  H 15.6  H
 
  Eosinophils % (0 - 5 %) 1.8 1.1
 
  Basophils % (0.0 - 2.0 %) 0.2 0.5
 
  Absolute Granulocytes (1.4 - 6.5 /CUMM) 5.5 6.0
 
  Absolute Lymphocytes (1.2 - 3.4 /CUMM) 1.1  L 1.0  L
 
  Absolute Monocytes (0.10 - 0.60 /CUMM) 1.3  H 1.3  H
 
  Absolute Eosinophils (0.0 - 0.7 /CUMM) 0.1 0.1
 
  Absolute Basophils (0.0 - 0.2 /CUMM) 0 0
 
 
 
 
 03/20 03/19 03/19
 
 0600 2100 1852
 
Chemistry   
 
  Sodium (137 - 145 mmol/L) Cancelled  144
 
  Potassium (3.5 - 5.1 mmol/L) Cancelled  4.5
 
  Chloride (98 - 107 mmol/L) Cancelled  86  L
 
  Carbon Dioxide (22 - 30 mmol/L) Cancelled  30
 
  Anion Gap (5 - 16) Cancelled  27  H
 
  BUN (9 - 20 mg/dL) Cancelled  25  H
 
  Creatinine (0.7 - 1.2 mg/dL) Cancelled  7.0 *H
 
  Estimated GFR (>60 ml/min)   8  L
 
  BUN/Creatinine Ratio (7 - 25 %) Cancelled  3.6  L
 
  Glucose (65 - 99 mg/dL)   83
 
  Hemoglobin A1c (4.2 - 5.8 %)   5.1
 
  Lactic Acid (0.7 - 2.1 mmol/L)  1.7 2.8  H
 
  Calcium (8.4 - 10.2 mg/dL)   8.6
 
  Total Bilirubin (0.2 - 1.3 mg/dL)   0.8
 
  AST (17 - 59 U/L)   17
 
  ALT (21 - 72 U/L)   14  L
 
  Alkaline Phosphatase (< 127 U/L)   84
 
  Troponin I (<0.11 ng/ml)   0.02
 
  C-Reactive Prot, Quant (<1.0 mg/dL)   4.7  H
 
  Total Protein (6.3 - 8.2 g/dL)   9.1  H
 
  Albumin (3.5 - 5.0 g/dL)   4.8
 
  Globulin (1.9 - 4.2 gm/dL)   4.3  H
 
  Albumin/Globulin Ratio (1.1 - 2.2 %)   1.1
 
  Triglycerides (<150 mg/dL)   282  H
 
  Cholesterol (< 200 MG/DL)   235  H
 
  LDL Cholesterol, Calc (65 - 129 mg/dL)   144  H
 
  HDL Cholesterol (40 - 60 mg/dL)   35  L
 
  Cholesterol/HDL Ratio (0.00 - 4.88 %)   7  H
 
  TSH (0.270 - 4.200 uIU/mL)   1.310
 
  Free T4 (0.64 - 1.79 ng/dL)   1.72
 
Coagulation   
 
  PT (9.4 - 12.5 SEC)   11.7
 
  INR (0.90 - 1.17)   1.07
 
  APTT (25 - 37 SEC)   36
 
Hematology   
 
  CBC w Diff Cancelled  NO MAN DIFF REQ
 
  WBC (4.8 - 10.8 /CUMM) Cancelled  11.2  H
 
  RBC (4.70 - 6.10 /CUMM) Cancelled  5.84
 
  Hgb (14.0 - 18.0 G/DL) Cancelled  15.5
 
  Hct (42 - 52 %) Cancelled  48.8
 
  MCV (80.0 - 94.0 FL) Cancelled  83.6
 
  MCH (27.0 - 31.0 PG) Cancelled  26.5  L
 
  MCHC (33.0 - 37.0 G/DL) Cancelled  31.7  L
 
  RDW (11.5 - 14.5 %) Cancelled  21.7  H
 
  Plt Count (130 - 400 /CUMM) Cancelled  256
 
  MPV (7.4 - 10.4 FL) Cancelled  10.3
 
  Gran % (42.2 - 75.2 %)   80.0  H
 
  Lymphocytes % (20.5 - 51.1 %)   8.2  L
 
  Monocytes % (1.7 - 9.3 %)   9.9  H
 
  Eosinophils % (0 - 5 %)   1.4
 
  Basophils % (0.0 - 2.0 %)   0.5
 
  Absolute Granulocytes (1.4 - 6.5 /CUMM)   9.0  H
 
  Absolute Lymphocytes (1.2 - 3.4 /CUMM)   0.9  L
 
  Absolute Monocytes (0.10 - 0.60 /CUMM)   1.1  H
 
  Absolute Eosinophils (0.0 - 0.7 /CUMM)   0.2
 
  Absolute Basophils (0.0 - 0.2 /CUMM)   0.1
 
  ESR Westergren (0 - 10 MM)   21  H

## 2018-03-22 NOTE — CONS- VASCULAR SURGERY
General Information and HPI
 
Consulting Request
Date of Consult: 03/21/18
Requested By:
Mickey Montalvo MD
 
History of Present Illness:
Patient is a 52-year-old man with end-stage renal disease on hemodialysis via a 
left groin new AV graft (Monday Wednesday and Friday). He also has history of 
hypertension, CVA, seizure disorder, and hyperparathyroidism. He presented to 
the hospital with nonhealing left heel wound. He underwent incision and drainage
and debridement by Dr. Crocker. Today ultrasound of the left lower extremity 
showed monophasic flow. Vascular surgery was consulted regarding adequacy of 
blood flow to the left foot.
 
Allergies/Medications
Allergies:
Coded Allergies:
Iodinated Contrast- Oral and IV Dye (IODINATED CONTRAST MEDIA - IV DYE) (RASH 03
/19/18)
 
Home Med List:
Esomeprazole (Nexium) 40 MG CAPSULE.DR Rush CAP PO BID ACID REFLUX  (Reported)
 
 
Past History
 
Medical History
Blood Transfusion Hx: Yes
Neurological: CVA, migraine, seizure
EENT: NONE
Cardiovascular: hypertension
Respiratory: collapsed left lung with a persistent left pleural effusion.
Gastrointestinal: GERD, GI BLEED
Hepatic: NONE
Renal: ESRD on HD, DIALYSIS M-W-F ACCESS L LEG
Musculoskeletal: gout, osteoarthritis, secondary OSTEOPOROSIS GANGREEN L 2ND 
FINGER
Psychiatric: NONE
Endocrine: hyperparathyroidism
Blood Disorders: NONE
Cancer(s): NONE
GYN/Reproductive: NONE
Other Medical Hx:
In 2004 he had an infected abscess with MSSA bacteremia.
 Complete central venous occlusion in 2007.
 
Surgical History
Pertinent Surgical History: GRAFT, LEFT THIGH s/p placement of numerous August 
catheters/also dual-lumen Amor's left radial cephalic primary AVF on 03/28/
2000 left upper arm AVG Tenckhoff catheter placement and subsequent removal 
status post amputation of the left index finger for gangrene
 
Family History
Relations & Conditions If Any:
MOTHER (Heart Dz, Breast Ca).
FATHER (Diabetes).
Relation not specified for:
  FH: stroke
 
 
Psychosocial History
Where Do You Live? Home
Who Do You Live With? self
Services at Home: Nursing
Primary Language: English
Smoking Status: Never Smoked
ETOH Use: denies use
Illicit Drug Use: denies illicit drug use
 
Functional Ability
ADLs
Independent: dressing, eating. 
Ambulation: cane
IADLs
Independent: shopping, housework, finances. 
 
Review of Systems
Review of Systems:
Patient denies headache, dizziness, cough, palpitation, diarrhea or constipation
 
Exam & Diagnostic Data
Vital Signs and I&O
Vital Signs
 
 
Date Time Temp Pulse Resp B/P B/P Pulse O2 O2 Flow FiO2
 
     Mean Ox Delivery Rate 
 
03/22 0721 97.9 82 20 98/62  96   
 
03/21 2210 98.0 80 18 110/60  94 Room Air  
 
03/21 1426 98.0 86 20 100/58  96 Room Air  
 
 
 Intake & Output
 
 
 03/22 1600 03/22 0800 03/22 0000 03/21 1600 03/21 0800 03/21 0000
 
Intake Total  240 240 720 480 200
 
Output Total    20  
 
Balance  240 240 700 480 200
 
       
 
Intake, Oral  240 240 720 480 200
 
Number    0  
 
Bowel      
 
Movements      
 
Output,    20  
 
Drainage      
 
Output, Urine    0  
 
Patient     205 lb 
 
Weight      
 
Weight     Bed scale 
 
Measurement      
 
Method      
 
 
 
Physical Exam:
Patient is alert and oriented 3
Lungs: Clear to auscultation bilaterally
Heart: Regular rate and rhythm
Abdomen: Soft, nontender and nondistended
Extremities: There are palpable femoral pulses bilaterally. Left foot is dressed
and there is a negative pressure dressing on the left heel. There is palpable 
right dorsalis pedis pulse.
 
Assessment/Plan
Assessment/Plan
52-year-old man with end-stage renal disease and other medical issues with 
nonhealing left heel wound who is status post debridement and incision and 
drainage by Dr. Crocker. Ultrasound showed monophasic flow. I think the patient
will benefit from an angiogram of the left leg.
 
He is on the schedule for Friday the 23rd for left leg angiogram at noon.
Please coordinate his dialysis tomorrow so that it would not interfere with the 
time of the angiogram.
 
Thank you
 
 
Consult Acknowledgment
- Thank you for your consult request.
 
Attending MD Review Statement
 
Attending Statement
Attending MD Statement: examined this patient, discuss w/resident/PA/NP

## 2018-03-22 NOTE — PN- HOUSESTAFF
Jovan BEDOLLA,[facility] 18 0716:
Subjective
Follow-up For:
Left foot osteomyelitis
bacteremia
Subjective:
Patient was seen and examined at bedside. He states he slept well last night. 
His pain has been reasonably controlled. He said he thought having the MRI but 
does wish to have it. 
 
Review of Systems
Constitutional:
Reports: no symptoms. 
 
Objective
Last 24 Hrs of Vital Signs/I&O
 Vital Signs
 
 
Date Time Temp Pulse Resp B/P B/P Pulse O2 O2 Flow FiO2
 
     Mean Ox Delivery Rate 
 
 0721 97.9 82 20 98/62  96   
 
 2210 98.0 80 18 110/60  94 Room Air  
 
 1426 98.0 86 20 100/58  96 Room Air  
 
 
 Intake & Output
 
 
  1600  0800  0000
 
Intake Total  240 240
 
Output Total   
 
Balance  240 240
 
    
 
Intake, Oral  240 240
 
 
 
 
Physical Exam
General Appearance: Alert, Oriented X3, Cooperative, No Acute Distress
Skin: No Rashes, No Breakdown
Skin Temp/Moisture Exam: Warm/Dry
Sepsis Skin Exam (color): Normal for Ethnicity
HEENT: Atraumatic
Cardiovascular: Normal S1, Normal S2, No Murmurs
Lungs: Clear to Auscultation, Normal Air Movement
Abdomen: Soft, No Tenderness
Neurological: Normal Speech
Extremities: No Edema, left foot in ace wraps with wound vac
 
Assessment/Plan
Assessment:
53 yo M with PMH of PVD, ESRD maintained on hemodialysis since 2000, HTN, 
hyperparathyroidism, CVA, seizures, gout, GERD, s/p left upper extremity finger 
amputation due to chronic osteomyelitis presented to ED with chief complaint of 
left foot pain and chronic left heel ulcer.
 
Assessment:
 
1. Left foot Ulcer
2. ? Left foot Cellulitis
3. History of ESRD on dialysis
4. History of PVD
 
Plan:
 
* s/p I&D with placement of a wound vac. 
* Patient has completely refused MRI. 
* Watch off antibiotics for now. 
* Dialysis today. He will have another session early in the day. 
* Patient states that he receives dialysis at  [facility] [place]. Dr Wolff in [place] dialysis/renal issues per patient. 
* His blood cultures 1/2 bottles is growing diphtheroids ... likely a 
contaminant. 
* Vascular recommends angiogram in OR tomorrow. Scheduled for OR around noon. 
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
right.
* Lipid panel suggestive of hyperlipidemia
* TFTs and HbA1c is normal. 
* Continue Oxycontin 15mg BID and Oxycodone 5mg for severe pain. 
* Continue PPI. 
* Diet: Renal Dialysis. NPO at midnight for OR tomorrow. 
* DVT Prophylaxis: SC Heparin x 3
* Code: Full Code
Problem List:
 1. ESRD (end stage renal disease)
 
Pain Ratin
Pain Location:
none
Pain Goal: Remain pain free
Pain Plan:
none
Tomorrow's Labs & Rationales:
CBC, BEP
 
 
Mickey Montalvo 18 1300:
Attending MD Review Statement
 
Attending Statement
Attending MD Statement: examined this patient, discuss w/resident/PA/NP, agreed 
w/resident/PA/NP, discussed with family, reviewed EMR data (avail), discussed 
with nursing, discussed with case mgmt, reviewed images, amended to note
Attending Assessment/Plan:
Patient is s/p debridement with podiatry for possible OM of left foot. MRI 
pending (was sent down but got agitated and trial of MRI again with sedation as 
claustrophobic). ID and podiatry consulted. Abx as per ID. Patient has wound vac
now. He has ESRD on hemodialysis follows nephorlogy as outpatient. Nephrology in
house called and plan for dialysis as per nephro. Vascular surgery plans to do 
angiogram tomorrow. gi/dvt prophyalxis

## 2018-03-23 VITALS — DIASTOLIC BLOOD PRESSURE: 62 MMHG | SYSTOLIC BLOOD PRESSURE: 100 MMHG

## 2018-03-23 VITALS — SYSTOLIC BLOOD PRESSURE: 100 MMHG | DIASTOLIC BLOOD PRESSURE: 62 MMHG

## 2018-03-23 VITALS — DIASTOLIC BLOOD PRESSURE: 66 MMHG | SYSTOLIC BLOOD PRESSURE: 100 MMHG

## 2018-03-23 VITALS — SYSTOLIC BLOOD PRESSURE: 84 MMHG | DIASTOLIC BLOOD PRESSURE: 60 MMHG

## 2018-03-23 VITALS — DIASTOLIC BLOOD PRESSURE: 80 MMHG | SYSTOLIC BLOOD PRESSURE: 118 MMHG

## 2018-03-23 NOTE — PN- NEPHROLOGY
Assessment/Plan Nephrology
Assessment:
ESRD.   Dialysis rescheduled for tomorrow then will return to [facility] schedule. 
Asked patient to discuss plans with surgery.  I told him he probably should 
undergo the procedure as vascular flow to the area will be important to know in 
terms of eventual healing.
Fidel Horner MD
Suggestion:
.
 
 
Subjective
Subjective:
Pt comfortable. Was to go to OR today but refused.  Apparently as to have 
angiogram.  Discussed with patient about usefulness of this test given his foot 
ulcer.  
 
Objective
Vital Signs and I&Os
BP 84/60 
LUngs clear
Cor RRR
Abd soft
Ext neg edema foot wrapped with wound vac
 
Results
Pertinent Lab Results:
 Laboratory Tests
 
 
 03/22 03/22 03/22
 
 1200 0830 0756
 
Chemistry   
 
  Sodium (137 - 145 mmol/L)  140 
 
  Potassium (3.5 - 5.1 mmol/L)  5.3  H 
 
  Chloride (98 - 107 mmol/L)  93  L 
 
  Carbon Dioxide (22 - 30 mmol/L)  22 
 
  Anion Gap (5 - 16)  25  H 
 
  BUN (9 - 20 mg/dL) 14 58  H 
 
  Creatinine (0.7 - 1.2 mg/dL)  12.3 *H 
 
  Estimated GFR (>60 ml/min)  4  L 
 
  BUN/Creatinine Ratio (7 - 25 %)  4.7  L 
 
  Calcium (8.4 - 10.2 mg/dL)  7.1  L 
 
  Phosphorus (2.5 - 4.5 mg/dL)  8.0  H 
 
  Magnesium (1.6 - 2.3 mg/dL)  1.8 
 
  Albumin (3.5 - 5.0 g/dL)  3.8 
 
Hematology   
 
  CBC w Diff  NO MAN DIFF REQ 
 
  WBC (4.8 - 10.8 /CUMM)  8.3 
 
  RBC (4.70 - 6.10 /CUMM)  4.66  L 
 
  Hgb (14.0 - 18.0 G/DL)  12.5  L 
 
  Hct (42 - 52 %)  38.7  L 
 
  MCV (80.0 - 94.0 FL)  83.0 
 
  MCH (27.0 - 31.0 PG)  26.8  L 
 
  MCHC (33.0 - 37.0 G/DL)  32.3  L 
 
  RDW (11.5 - 14.5 %)  21.2  H 
 
  Plt Count (130 - 400 /CUMM)  200 
 
  MPV (7.4 - 10.4 FL)  10.5  H 
 
  Gran % (42.2 - 75.2 %)  76.0  H 
 
  Lymphocytes % (20.5 - 51.1 %)  9.0  L 
 
  Monocytes % (1.7 - 9.3 %)  13.5  H 
 
  Eosinophils % (0 - 5 %)  1.1 
 
  Basophils % (0.0 - 2.0 %)  0.4 
 
  Absolute Granulocytes (1.4 - 6.5 /CUMM)  6.3 
 
  Absolute Lymphocytes (1.2 - 3.4 /CUMM)  0.7  L 
 
  Absolute Monocytes (0.10 - 0.60 /CUMM)  1.1  H 
 
  Absolute Eosinophils (0.0 - 0.7 /CUMM)  0.1 
 
  Absolute Basophils (0.0 - 0.2 /CUMM)  0 
 
Serology   
 
  Hep Bs Antigen (NONREACTIVE)  NONREACTIVE Cancelled
 
  Hep Bs Antibody (NONREACTIVE)  REACTIVE Cancelled
 
 
 
 
 03/22 03/21
 
 0624 1446
 
Chemistry  
 
  Sodium (137 - 145 mmol/L) 142 140
 
  Potassium (3.5 - 5.1 mmol/L) 5.8  H 4.8
 
  Chloride (98 - 107 mmol/L) 93  L 93  L
 
  Carbon Dioxide (22 - 30 mmol/L) 23 24
 
  Anion Gap (5 - 16) 26  H 23  H
 
  BUN (9 - 20 mg/dL) 56  H 49  H
 
  Creatinine (0.7 - 1.2 mg/dL) 12.5 *H 10.9 *H
 
  Estimated GFR (>60 ml/min) 4  L 5  L
 
  BUN/Creatinine Ratio (7 - 25 %) 4.5  L 4.5  L
 
Hematology  
 
  CBC w Diff NO MAN DIFF REQ NO MAN DIFF REQ
 
  WBC (4.8 - 10.8 /CUMM) 8.1 8.5
 
  RBC (4.70 - 6.10 /CUMM) 4.83 4.76
 
  Hgb (14.0 - 18.0 G/DL) 13.0  L 12.8  L
 
  Hct (42 - 52 %) 40.6  L 39.8  L
 
  MCV (80.0 - 94.0 FL) 84.1 83.7
 
  MCH (27.0 - 31.0 PG) 26.9  L 26.8  L
 
  MCHC (33.0 - 37.0 G/DL) 32.0  L 32.1  L
 
  RDW (11.5 - 14.5 %) 21.8  H 21.4  H
 
  Plt Count (130 - 400 /CUMM) 209 208
 
  MPV (7.4 - 10.4 FL) 10.6  H 10.5  H
 
  Gran % (42.2 - 75.2 %) 67.6 70.8
 
  Lymphocytes % (20.5 - 51.1 %) 13.8  L 12.0  L
 
  Monocytes % (1.7 - 9.3 %) 16.6  H 15.6  H
 
  Eosinophils % (0 - 5 %) 1.8 1.1
 
  Basophils % (0.0 - 2.0 %) 0.2 0.5
 
  Absolute Granulocytes (1.4 - 6.5 /CUMM) 5.5 6.0
 
  Absolute Lymphocytes (1.2 - 3.4 /CUMM) 1.1  L 1.0  L
 
  Absolute Monocytes (0.10 - 0.60 /CUMM) 1.3  H 1.3  H
 
  Absolute Eosinophils (0.0 - 0.7 /CUMM) 0.1 0.1
 
  Absolute Basophils (0.0 - 0.2 /CUMM) 0 0

## 2018-03-23 NOTE — PATIENT DISCHARGE INSTRUCTIONS
Discharge Instructions
 
General Discharge Information
You were seen/treated for:
Left Foot Ulcer
Watch for these problems:
Fever, chest pain, shortness of breath
Special Instructions:
Please follow up with a PCP within one week of discharge. Please follow-up with 
nephrology. podiatry, and vascular surgery in 2 weeks. If you develop a fever or
worsening of your heel, you can get an x-ray as an outpatient per podiatry.
 
Diet
Continue normal diet: Yes
Recommended Diet: Renal Dialysis
 
Activity
Full Activity/No Limits: Yes
 
Acute Coronary Syndrome
 
Inclusion Criteria
At DC or during hospital stay patient has or had the following:
ACS DIAGNOSIS No
 
Discharge Core Measures
Meds if any: Prescribed or Continued at Discharge
Meds if any: NOT Prescribed or Continued at Discharge
 
Congestive Heart Failure
 
Inclusion Criteria
At DC or during hospital stay patient has or had the following:
CHF DIAGNOSIS No
 
Discharge Core Measures
Meds if any: Prescribed or Continued at Discharge
Meds if any: NOT Prescribed or Continued at Discharge
 
Cerebrovascular accident
 
Inclusion Criteria
At DC or during hospital stay patient has or had the following:
CVA/TIA Diagnosis No
 
Discharge Core Measures
Meds if any: Prescribed or Continued at Discharge
Meds if any: NOT Prescribed or Continued at Discharge
 
Venous thromboembolism
 
Inclusion Criteria
VTE Diagnosis No
VTE Type NONE
VTE Confirmed by (Test) NONE
 
Discharge Core Measures
- Per Current guidelines, there needs to be overlap
- treatment for the first 5 days of Warfarin therapy.
- If discharged on Warfarin prior to 5 days of
- overlap therapy, the patient will need to be
- assessed for post discharge needs including
- *Post discharge parental anticoagulation
- *Warfarin and/or parental anticoagulation education
- *Follow up date to check INR post discharge
At least 5 days overlap therapy as Inpatient No
Meds if any: Prescribed or Continued at Discharge
Note: Overlap Therapy is Warfarin and Anticoagulant
Meds if any: NOT Prescribed or Continued at Discharge

## 2018-03-23 NOTE — PN- HOUSESTAFF
**See Addendum**
Subjective
Follow-up For:
Left foot ulcer
Subjective:
Patient was seen and examined at bedside. He reports feeling well. States he 
thought about having the procedure last night and does not want it at all. After
much discussion about the importance of the test, he has agreed but wants to 
talk to the surgeon before the procedure. 
 
Review of Systems
Constitutional:
Reports: no symptoms. 
 
Objective
Last 24 Hrs of Vital Signs/I&O
 Vital Signs
 
 
Date Time Temp Pulse Resp B/P B/P Pulse O2 O2 Flow FiO2
 
     Mean Ox Delivery Rate 
 
8 99.2 98 20 90/60  92 Room Air  
 
 0721 97.9 82 20 98/62  96   
 
 
 Intake & Output
 
 
  0800  0000  1600
 
Intake Total  510 300
 
Output Total  10 
 
Balance  500 300
 
    
 
Intake, IV  10 
 
Intake, Oral  500 300
 
Output,  10 
 
Drainage   
 
Patient   205 lb
 
Weight   
 
 
 
 
Physical Exam
General Appearance: Alert, Oriented X3, No Acute Distress
Skin: No Rashes, No Breakdown
Skin Temp/Moisture Exam: Warm/Dry
Sepsis Skin Exam (color): Normal for Ethnicity
HEENT: Atraumatic
Cardiovascular: Normal S1, Normal S2, No Murmurs
Lungs: Clear to Auscultation, Normal Air Movement
Abdomen: Soft, No Tenderness
Neurological: Normal Speech
Extremities: No Edema, left lefg in ace wraps and wound vac
 
Assessment/Plan
Assessment:
53 yo M with PMH of PVD, ESRD maintained on hemodialysis since 2000, HTN, 
hyperparathyroidism, CVA, seizures, gout, GERD, s/p left upper extremity finger 
amputation due to chronic osteomyelitis presented to ED with chief complaint of 
left foot pain and chronic left heel ulcer.
 
Assessment:
 
1. Left foot Ulcer
2. ? Left foot Cellulitis
3. History of ESRD on dialysis
4. History of PVD
 
Plan:
 
* s/p I&D with placement of a wound vac. 
* Patient has completely refused MRI. 
* Watch off antibiotics for now. 
* Scheduled for angiogram today in OR with vascular. 
* Further plans after procedure. 
* Dialysis tomorrow. He can resume his M/W/F schedule from next week. 
* Patient states that he receives dialysis at  [facility] [place]. Dr Wolff in [place] his dialysis/renal issues per patient. 
* His blood cultures 1/2 bottles grew diphtheroids ... likely a contaminant. 
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
right.
* Lipid panel suggestive of hyperlipidemia
* TFTs and HbA1c is normal. 
* Continue Oxycontin 15mg BID and Oxycodone 5mg for severe pain. 
* Continue PPI. 
* Diet: Renal Dialysis diet. 
* DVT Prophylaxis: SC Heparin x 3
* Code: Full Code
Problem List:
 1. Osteomyelitis
 
Pain Ratin
Pain Location:
none
Pain Goal: Remain pain free
Pain Plan:
none
Tomorrow's Labs & Rationales:
CBC, BEP

## 2018-03-24 VITALS — SYSTOLIC BLOOD PRESSURE: 90 MMHG | DIASTOLIC BLOOD PRESSURE: 60 MMHG

## 2018-03-24 VITALS — SYSTOLIC BLOOD PRESSURE: 90 MMHG | DIASTOLIC BLOOD PRESSURE: 50 MMHG

## 2018-03-24 VITALS — DIASTOLIC BLOOD PRESSURE: 60 MMHG | SYSTOLIC BLOOD PRESSURE: 100 MMHG

## 2018-03-24 VITALS — SYSTOLIC BLOOD PRESSURE: 90 MMHG | DIASTOLIC BLOOD PRESSURE: 52 MMHG

## 2018-03-24 VITALS — DIASTOLIC BLOOD PRESSURE: 47 MMHG | SYSTOLIC BLOOD PRESSURE: 86 MMHG

## 2018-03-24 LAB
ABSOLUTE GRANULOCYTE CT: 8 /CUMM (ref 1.4–6.5)
BASOPHILS # BLD: 0 /CUMM (ref 0–0.2)
BASOPHILS NFR BLD: 0.2 % (ref 0–2)
EOSINOPHIL # BLD: 0 /CUMM (ref 0–0.7)
EOSINOPHIL NFR BLD: 0.3 % (ref 0–5)
ERYTHROCYTE [DISTWIDTH] IN BLOOD BY AUTOMATED COUNT: 21.4 % (ref 11.5–14.5)
GRANULOCYTES NFR BLD: 75.6 % (ref 42.2–75.2)
HCT VFR BLD CALC: 40.3 % (ref 42–52)
LYMPHOCYTES # BLD: 0.8 /CUMM (ref 1.2–3.4)
MCH RBC QN AUTO: 27 PG (ref 27–31)
MCHC RBC AUTO-ENTMCNC: 32.2 G/DL (ref 33–37)
MCV RBC AUTO: 83.8 FL (ref 80–94)
MONOCYTES # BLD: 1.7 /CUMM (ref 0.1–0.6)
PLATELET # BLD: 217 /CUMM (ref 130–400)
PMV BLD AUTO: 10.5 FL (ref 7.4–10.4)
RED BLOOD CELL CT: 4.81 /CUMM (ref 4.7–6.1)
WBC # BLD AUTO: 10.6 /CUMM (ref 4.8–10.8)

## 2018-03-24 NOTE — RADIOLOGY REPORT
EXAMINATION:
FL FEMUR, LEFT
 
TECHNIQUE/FINDINGS:
Fluoroscopic guidance was provided for angiogram in the operating room.

## 2018-03-24 NOTE — PN- NEPHROLOGY
Assessment/Plan Nephrology
Assessment:
Stable on dialysis.  Somewhat over dry weight but limited UF due to hypotension.
 
Suggestion:
UF 1 liter today. Normallly MWF so next dialysis Monday 3-26
 
 
Subjective
Subjective:
Patient undergoing dialysis.  BP on low side but no complaints. 
 
Objective
Vital Signs and I&Os
Vital Signs
 
 
Date Time Temp Pulse Resp B/P B/P Pulse O2 O2 Flow FiO2
 
     Mean Ox Delivery Rate 
 
03/24 0743 98.3 108 18 90/60  94 Room Air  
 
03/23 2145 97.8 90 20 100/66  93   
 
03/23 1816 97.9 89 20 100/62  100 Nasal 2.0L 
 
       Cannula  
 
03/23 1815  89  100/62     
 
03/23 1243 97.9 102 20 118/80  94 Room Air  
 
 
 Intake & Output
 
 
 03/24 1600 03/24 0400 03/23 1600 03/23 0400 03/22 1600 03/22 0400
 
Intake Total 120 240 710 510 540 240
 
Output Total   0 10  
 
Balance 120 240 710 500 540 240
 
       
 
Intake, IV   310 10  
 
Intake, Oral 120 240 400 500 540 240
 
Number 0     
 
Bowel      
 
Movements      
 
Output,   0 10  
 
Drainage      
 
Patient 206 lb  196 lb  205 lb 
 
Weight      
 
Weight Bed scale     
 
Measurement      
 
Method      
 
 
 
Physical Exam:
VS as above Lungs: clear  CV: no rub Abd: nontender  Exts: no edema  Neuro: A&O
Current Medications:
 Current Medications
 
 
  Sig/Jabari Start time  Last
 
Medication Dose Route Stop Time Status Admin
 
Acetaminophen 1,000 MG .STK-MED ONE 03/23 1214 DC 
 
  IV 03/23 1215  
 
Dextrose/Sodium  1,000 ML Q13H 03/23 0600 DC 03/23
 
Chloride  IV   1833
 
Docusate Sodium 100 MG DAILY AS NEEDED PRN 03/23 2345 AC 
 
  PO   
 
Fentanyl Citrate 200 MCG .STK-MED ONE 03/23 1214 DC 
 
  IM 03/23 1215  
 
Heparin Sodium  5,000 UNIT Q8 03/20 0050 AC 03/24
 
(Porcine)  SC   0531
 
Meperidine HCl 50 MG .STK-MED ONE 03/23 1659 DC 
 
  IM 03/23 1700  
 
Midazolam HCl 6 MG .STK-MED ONE 03/23 1215 DC 
 
  IM 03/23 1216  
 
Morphine Sulfate 4 MG .STK-MED ONE 03/23 1659 DC 
 
  IM 03/23 1700  
 
Omeprazole 40 MG DAILY AC 03/20 0700 AC 03/24
 
  PO   0531
 
Oxycodone HCl 15 MG Q12 03/21 1000 AC 03/23
 
  PO   1021
 
Oxycodone HCl 5 MG Q6P PRN 03/21 0915 AC 03/23
 
  PO   2336
 
Polyethylene Glycol 17 GM DAILY 03/24 1000 AC 
 
  PO   
 
Senna 187 MG AT BEDTIME 03/24 2200 AC 
 
  PO   
 
 
 
 
Results
Pertinent Lab Results:
 Laboratory Tests
 
 
 03/24 03/23 03/22
 
 0745 0600 1200
 
Chemistry   
 
  Sodium (137 - 145 mmol/L) 138 Cancelled 
 
  Potassium (3.5 - 5.1 mmol/L) 5.9  H Cancelled 
 
  Chloride (98 - 107 mmol/L) 94  L Cancelled 
 
  Carbon Dioxide (22 - 30 mmol/L) 19  L Cancelled 
 
  Anion Gap (5 - 16) 25  H Cancelled 
 
  BUN (9 - 20 mg/dL) 50  H Cancelled 14
 
  Creatinine (0.7 - 1.2 mg/dL) 11.2 *H Cancelled 
 
  Estimated GFR (>60 ml/min) 5  L  
 
  BUN/Creatinine Ratio (7 - 25 %) 4.5  L Cancelled 
 
  Calcium (8.4 - 10.2 mg/dL) 7.6  L  
 
Hematology   
 
  CBC w Diff NO MAN DIFF REQ Cancelled 
 
  WBC (4.8 - 10.8 /CUMM) 10.6 Cancelled 
 
  RBC (4.70 - 6.10 /CUMM) 4.81 Cancelled 
 
  Hgb (14.0 - 18.0 G/DL) 13.0  L Cancelled 
 
  Hct (42 - 52 %) 40.3  L Cancelled 
 
  MCV (80.0 - 94.0 FL) 83.8 Cancelled 
 
  MCH (27.0 - 31.0 PG) 27.0 Cancelled 
 
  MCHC (33.0 - 37.0 G/DL) 32.2  L Cancelled 
 
  RDW (11.5 - 14.5 %) 21.4  H Cancelled 
 
  Plt Count (130 - 400 /CUMM) 217 Cancelled 
 
  MPV (7.4 - 10.4 FL) 10.5  H Cancelled 
 
  Gran % (42.2 - 75.2 %) 75.6  H  
 
  Lymphocytes % (20.5 - 51.1 %) 7.7  L  
 
  Monocytes % (1.7 - 9.3 %) 16.2  H  
 
  Eosinophils % (0 - 5 %) 0.3  
 
  Basophils % (0.0 - 2.0 %) 0.2  
 
  Absolute Granulocytes (1.4 - 6.5 /CUMM) 8.0  H  
 
  Absolute Lymphocytes (1.2 - 3.4 /CUMM) 0.8  L  
 
  Absolute Monocytes (0.10 - 0.60 /CUMM) 1.7  H  
 
  Absolute Eosinophils (0.0 - 0.7 /CUMM) 0  
 
  Absolute Basophils (0.0 - 0.2 /CUMM) 0  
 
 
 
 
 03/22 03/22
 
 0830 0756
 
Chemistry  
 
  Sodium (137 - 145 mmol/L) 140 
 
  Potassium (3.5 - 5.1 mmol/L) 5.3  H 
 
  Chloride (98 - 107 mmol/L) 93  L 
 
  Carbon Dioxide (22 - 30 mmol/L) 22 
 
  Anion Gap (5 - 16) 25  H 
 
  BUN (9 - 20 mg/dL) 58  H 
 
  Creatinine (0.7 - 1.2 mg/dL) 12.3 *H 
 
  Estimated GFR (>60 ml/min) 4  L 
 
  BUN/Creatinine Ratio (7 - 25 %) 4.7  L 
 
  Calcium (8.4 - 10.2 mg/dL) 7.1  L 
 
  Phosphorus (2.5 - 4.5 mg/dL) 8.0  H 
 
  Magnesium (1.6 - 2.3 mg/dL) 1.8 
 
  Albumin (3.5 - 5.0 g/dL) 3.8 
 
Hematology  
 
  CBC w Diff NO MAN DIFF REQ 
 
  WBC (4.8 - 10.8 /CUMM) 8.3 
 
  RBC (4.70 - 6.10 /CUMM) 4.66  L 
 
  Hgb (14.0 - 18.0 G/DL) 12.5  L 
 
  Hct (42 - 52 %) 38.7  L 
 
  MCV (80.0 - 94.0 FL) 83.0 
 
  MCH (27.0 - 31.0 PG) 26.8  L 
 
  MCHC (33.0 - 37.0 G/DL) 32.3  L 
 
  RDW (11.5 - 14.5 %) 21.2  H 
 
  Plt Count (130 - 400 /CUMM) 200 
 
  MPV (7.4 - 10.4 FL) 10.5  H 
 
  Gran % (42.2 - 75.2 %) 76.0  H 
 
  Lymphocytes % (20.5 - 51.1 %) 9.0  L 
 
  Monocytes % (1.7 - 9.3 %) 13.5  H 
 
  Eosinophils % (0 - 5 %) 1.1 
 
  Basophils % (0.0 - 2.0 %) 0.4 
 
  Absolute Granulocytes (1.4 - 6.5 /CUMM) 6.3 
 
  Absolute Lymphocytes (1.2 - 3.4 /CUMM) 0.7  L 
 
  Absolute Monocytes (0.10 - 0.60 /CUMM) 1.1  H 
 
  Absolute Eosinophils (0.0 - 0.7 /CUMM) 0.1 
 
  Absolute Basophils (0.0 - 0.2 /CUMM) 0 
 
Serology  
 
  Hep Bs Antigen (NONREACTIVE) NONREACTIVE Cancelled
 
  Hep Bs Antibody (NONREACTIVE) REACTIVE Cancelled
 
 
 
 
 03/22 03/21
 
 0624 1446
 
Chemistry  
 
  Sodium (137 - 145 mmol/L) 142 140
 
  Potassium (3.5 - 5.1 mmol/L) 5.8  H 4.8
 
  Chloride (98 - 107 mmol/L) 93  L 93  L
 
  Carbon Dioxide (22 - 30 mmol/L) 23 24
 
  Anion Gap (5 - 16) 26  H 23  H
 
  BUN (9 - 20 mg/dL) 56  H 49  H
 
  Creatinine (0.7 - 1.2 mg/dL) 12.5 *H 10.9 *H
 
  Estimated GFR (>60 ml/min) 4  L 5  L
 
  BUN/Creatinine Ratio (7 - 25 %) 4.5  L 4.5  L
 
Hematology  
 
  CBC w Diff NO MAN DIFF REQ NO MAN DIFF REQ
 
  WBC (4.8 - 10.8 /CUMM) 8.1 8.5
 
  RBC (4.70 - 6.10 /CUMM) 4.83 4.76
 
  Hgb (14.0 - 18.0 G/DL) 13.0  L 12.8  L
 
  Hct (42 - 52 %) 40.6  L 39.8  L
 
  MCV (80.0 - 94.0 FL) 84.1 83.7
 
  MCH (27.0 - 31.0 PG) 26.9  L 26.8  L
 
  MCHC (33.0 - 37.0 G/DL) 32.0  L 32.1  L
 
  RDW (11.5 - 14.5 %) 21.8  H 21.4  H
 
  Plt Count (130 - 400 /CUMM) 209 208
 
  MPV (7.4 - 10.4 FL) 10.6  H 10.5  H
 
  Gran % (42.2 - 75.2 %) 67.6 70.8
 
  Lymphocytes % (20.5 - 51.1 %) 13.8  L 12.0  L
 
  Monocytes % (1.7 - 9.3 %) 16.6  H 15.6  H
 
  Eosinophils % (0 - 5 %) 1.8 1.1
 
  Basophils % (0.0 - 2.0 %) 0.2 0.5
 
  Absolute Granulocytes (1.4 - 6.5 /CUMM) 5.5 6.0
 
  Absolute Lymphocytes (1.2 - 3.4 /CUMM) 1.1  L 1.0  L
 
  Absolute Monocytes (0.10 - 0.60 /CUMM) 1.3  H 1.3  H
 
  Absolute Eosinophils (0.0 - 0.7 /CUMM) 0.1 0.1
 
  Absolute Basophils (0.0 - 0.2 /CUMM) 0 0

## 2018-03-24 NOTE — PN- HOUSESTAFF
**See Addendum**
Subjective
Follow-up For:
Left Foot Ulcer
Subjective:
patient was seen and examined at bedside. He states his foot still aches and the
pain is not significantly relieved with the meds he's getting. He refuses the 
MRI
 
Review of Systems
Constitutional:
Reports: no symptoms. 
 
Objective
Last 24 Hrs of Vital Signs/I&O
 Vital Signs
 
 
Date Time Temp Pulse Resp B/P B/P Pulse O2 O2 Flow FiO2
 
     Mean Ox Delivery Rate 
 
 0743 98.3 108 18 90/60  94 Room Air  
 
 2145 97.8 90 20 100/66  93   
 
 1816 97.9 89 20 100/62  100 Nasal 2.0L 
 
       Cannula  
 
 1815  89  100/62     
 
 1243 97.9 102 20 118/80  94 Room Air  
 
 
 Intake & Output
 
 
  1600  0800  0000
 
Intake Total  120 240
 
Output Total   
 
Balance  120 240
 
    
 
Intake, Oral  120 240
 
Number  0 
 
Bowel   
 
Movements   
 
Patient  206 lb 
 
Weight   
 
Weight  Bed scale 
 
Measurement   
 
Method   
 
 
 
 
Physical Exam
General Appearance: Alert, Oriented X3, No Acute Distress
Skin: No Rashes, No Breakdown
Skin Temp/Moisture Exam: Warm/Dry
Sepsis Skin Exam (color): Normal for Ethnicity
HEENT: Atraumatic
Cardiovascular: Normal S1, Normal S2, No Murmurs
Lungs: Clear to Auscultation, Normal Air Movement
Abdomen: Soft, No Tenderness
Neurological: Normal Speech
Extremities: No Edema, left foot in ace wraps and wound vac
 
Assessment/Plan
Assessment:
51 yo M with PMH of PVD, ESRD maintained on hemodialysis since 2000, HTN, 
hyperparathyroidism, CVA, seizures, gout, GERD, s/p left upper extremity finger 
amputation due to chronic osteomyelitis presented to ED with chief complaint of 
left foot pain and chronic left heel ulcer.
 
Assessment:
 
1. Left foot Ulcer
2. ? Left foot Cellulitis
3. History of ESRD on dialysis
4. History of PVD
 
Plan:
 
* s/p I&D with placement of a wound vac. 
* Patient is still completely refused MRI. 
* Watch off antibiotics for now. 
* Angiogram with angioplasty yesterday. 
* Await further input from Podiatry regarding removal of wound vac and for 
possible bone biopsy.  
* Dialysis today. He can resume his M/W/F schedule from next week. 
* Patient states that he receives dialysis at  [facility] [place]. Dr Wolff in [place] his dialysis/renal issues per patient. 
* His blood cultures 1/2 bottles grew diphtheroids ... likely a contaminant. 
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
right.
* Lipid panel suggestive of hyperlipidemia
* TFTs and HbA1c is normal. 
* Continue Oxycontin 15mg BID and Oxycodone 5mg q6p for severe pain. 
* Continue PPI. 
* Diet: Renal Dialysis diet. 
* DVT Prophylaxis: SC Heparin x 3
* Code: Full Code
Problem List:
 1. Osteomyelitis
 
Pain Ratin
Pain Location:
none
Pain Goal: Remain pain free
Pain Plan:
none
Tomorrow's Labs & Rationales:
CBC, BEP

## 2018-03-24 NOTE — EVENT NOTE
Event Note
Event Note:
Patient noted to be hypotensive after he had his HD today, his BP found to be 80
/50 mmgh from the right arm, he has the nonfunctioning AVF on the left arm, He 
also complains of blurry vision, he mentioned that he knows if his BP low after 
dialysis if he started to have blurry vision, he usually manged with adding back
some fluid with IV blouses. He never received Midodrine. He denies CP, SOB, 
Dizziness, palpitation.
Will give 250ml NS bolus, check BP afterward if still low, then give the other 
250ml of NS. 
Above signed out to night float team

## 2018-03-25 VITALS — DIASTOLIC BLOOD PRESSURE: 46 MMHG | SYSTOLIC BLOOD PRESSURE: 88 MMHG

## 2018-03-25 VITALS — SYSTOLIC BLOOD PRESSURE: 88 MMHG | DIASTOLIC BLOOD PRESSURE: 42 MMHG

## 2018-03-25 VITALS — SYSTOLIC BLOOD PRESSURE: 92 MMHG | DIASTOLIC BLOOD PRESSURE: 52 MMHG

## 2018-03-25 VITALS — SYSTOLIC BLOOD PRESSURE: 86 MMHG | DIASTOLIC BLOOD PRESSURE: 52 MMHG

## 2018-03-25 NOTE — PN- HOUSESTAFF
Rocio BEDOLLA,Annemarie 18 0829:
Subjective
Follow-up For:
Left foot ulcer
Complaints: no complaints
Subjective:
Patient seen and examined at bedside.  No overnight events.  No complaints.  He 
denies pain in his left foot, chest pain, shortness of breath.
 
Review of Systems
Constitutional:
Reports: see HPI. 
 
Objective
Last 24 Hrs of Vital Signs/I&O
 Vital Signs
 
 
Date Time Temp Pulse Resp B/P B/P Pulse O2 O2 Flow FiO2
 
     Mean Ox Delivery Rate 
 
 1446 97.8 91 19 86/52  94 Room Air  
 
 0608 98.0 89 20 88/46  93   
 
 0030 98.0 87 20 88/42  95 Room Air  
 
 2211 98.1 105 19 86/47  93 Room Air  
 
 1948    / 1754  111 18 90/50  92 Room Air  
 
 
 Intake & Output
 
 
  1600  0800  0000
 
Intake Total  60 1000
 
Output Total   0
 
Balance  60 1000
 
    
 
Intake, IV   500
 
Intake, Oral  60 500
 
Number  0 
 
Bowel   
 
Movements   
 
Output, Urine   0
 
Patient  199 lb 
 
Weight   
 
Weight  Bed scale 
 
Measurement   
 
Method   
 
 
 
 
Physical Exam
General Appearance: Alert, Oriented X3, Cooperative, No Acute Distress
Cardiovascular: Normal S1, Normal S2, No Murmurs
Lungs: Normal Air Movement
Abdomen: Soft, No Tenderness, No Hepatospenomegaly
Neurological: Strength at 5/5 X4 Ext, Normal Tone, Sensation Intact
Current Medications:
 Current Medications
 
 
  Sig/Jabari Start time  Last
 
Medication Dose Route Stop Time Status Admin
 
Docusate Sodium 100 MG DAILY AS NEEDED PRN  2345 AC 
 
  PO   
 
Heparin Sodium  5,000 UNIT Q8  0050 AC 
 
(Porcine)  SC   0515
 
Omeprazole 40 MG DAILY AC  0700 AC 
 
  PO   0515
 
Oxycodone HCl 15 MG Q12  1000 AC 
 
  PO   1111
 
Oxycodone HCl 5 MG Q6P PRN  0915 AC 
 
  PO   2336
 
Polyethylene Glycol 17 GM DAILY  1000 AC 
 
  PO   
 
Senna 187 MG AT BEDTIME  2200 AC 
 
  PO   
 
 
 
 
Assessment/Plan
Assessment:
51 yo M with PMH of PVD, ESRD maintained on hemodialysis since 2000, HTN, 
hyperparathyroidism, CVA, seizures, gout, GERD, s/p left upper extremity finger 
amputation due to chronic osteomyelitis presented to ED with chief complaint of 
left foot pain and chronic left heel ulcer.
 
Assessment:
 
1. Left foot Ulcer
2. ? Left foot Cellulitis
3. History of ESRD on dialysis
4. History of PVD
 
Plan:
 
* s/p I&D with placement of a wound vac. 
* Watch off antibiotics for now.  Patient has hypotension at baseline.  
* Dialysis tomorrow. He can resume his M/W/F schedule from next week. 
* Patient states that he receives dialysis at  [facility] [place]. Dr Wolff in [place] his dialysis/renal issues per patient. 
* His blood cultures 1/2 bottles grew diphtheroids ... likely a contaminant. 
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
right.
* Lipid panel suggestive of hyperlipidemia
* TFTs and HbA1c is normal. 
* Continue Oxycontin 15mg BID and Oxycodone 5mg for severe pain. 
* Continue PPI. 
* Diet: Renal Dialysis diet. 
* DVT Prophylaxis: SC Heparin x 3
* Code: Full Code
Problem List:
 1. Osteomyelitis
 
Pain Ratin
Pain Location:
NONE
Pain Goal: Remain pain free
Pain Plan:
TYLENOL
Tomorrow's Labs & Rationales:
CBC,BEP
 
 
Dewayne BEDOLLA,Karey 18 1320:
Attending MD Review Statement
 
Attending Statement
Attending MD Statement: examined this patient, discuss w/resident/PA/NP, agreed 
w/resident/PA/NP, reviewed EMR data (avail), discussed with nursing, amended to 
note
Attending Assessment/Plan:
Patient seen and examined, currently denies any complaints. Patient currently on
OxyContin and oxycodone for pain management.  Status post aortogram/angiogram 
with Angioplasty of left anterior tibial, posterior tibial, peroneal, and 
dorsalis pedis artery.  Patient did have episodes of hypotension and required 
some extra fluid.
 
Currently has been watched off of antibiotics.  Please discuss further with 
podiatry and vascular surgery about further plans.  Continue current management.

## 2018-03-26 VITALS — SYSTOLIC BLOOD PRESSURE: 102 MMHG | DIASTOLIC BLOOD PRESSURE: 60 MMHG

## 2018-03-26 VITALS — SYSTOLIC BLOOD PRESSURE: 100 MMHG | DIASTOLIC BLOOD PRESSURE: 60 MMHG

## 2018-03-26 LAB
ABSOLUTE GRANULOCYTE CT: 6.9 /CUMM (ref 1.4–6.5)
BASOPHILS # BLD: 0 /CUMM (ref 0–0.2)
BASOPHILS NFR BLD: 0.1 % (ref 0–2)
EOSINOPHIL # BLD: 0.1 /CUMM (ref 0–0.7)
EOSINOPHIL NFR BLD: 1.6 % (ref 0–5)
ERYTHROCYTE [DISTWIDTH] IN BLOOD BY AUTOMATED COUNT: 21.1 % (ref 11.5–14.5)
GRANULOCYTES NFR BLD: 78.4 % (ref 42.2–75.2)
HCT VFR BLD CALC: 39.1 % (ref 42–52)
LYMPHOCYTES # BLD: 0.6 /CUMM (ref 1.2–3.4)
MCH RBC QN AUTO: 26.5 PG (ref 27–31)
MCHC RBC AUTO-ENTMCNC: 31.6 G/DL (ref 33–37)
MCV RBC AUTO: 83.9 FL (ref 80–94)
MONOCYTES # BLD: 1.2 /CUMM (ref 0.1–0.6)
PLATELET # BLD: 227 /CUMM (ref 130–400)
PMV BLD AUTO: 10.7 FL (ref 7.4–10.4)
RED BLOOD CELL CT: 4.66 /CUMM (ref 4.7–6.1)
WBC # BLD AUTO: 8.8 /CUMM (ref 4.8–10.8)

## 2018-03-26 NOTE — PN- NEPHROLOGY
Assessment/Plan Nephrology
Assessment:
 
1.  ESRD
 
2.  Peripheral vascular disease with ulcer left heel, now with wound VAC, status
post angioplasty of left anterior tibial, posterior tibial, peroneal, and 
dorsalis pedis arteries
 
 
Suggestion:
 
1.  Hemodialysis today with 2-2.5 L ultrafiltration as tolerated although he is 
about 5 L over his dry weight.  He apparently does not tolerate more aggressive 
ultrafiltration without significantly dropping his blood pressure
 
2.  No antibiotic therapy per ID
 
3.  Vascular surgery and podiatry follow-up
 
 
 
 
Subjective
Subjective:
 
He offers no specific complaints today.  Refuses to consider attempt at more 
aggressive ultrafiltration.  Counseled regarding his weight gains.
 
 
 
Objective
Vital Signs and I&Os
Vital Signs
 
 
Date Time Temp Pulse Resp B/P B/P Pulse O2 O2 Flow FiO2
 
     Mean Ox Delivery Rate 
 
03/26 0623 97.6 87 20 102/60  94   
 
03/25 2145 98.2 85 20 92/52  95 Room Air  
 
 
 Intake & Output
 
 
 03/26 1600 03/26 0400 03/25 1600 03/25 0400 03/24 1600 03/24 0400
 
Intake Total 240  840 240
 
Output Total   0 0 0 
 
Balance 240  840 240
 
       
 
Intake, IV    500 0 
 
Intake, Oral 240 480 460 500 840 240
 
Number   0  0 
 
Bowel      
 
Movements      
 
Output, Urine   0 0 0 
 
Patient   199 lb  206 lb 
 
Weight      
 
Weight   Bed scale  Bed scale 
 
Measurement      
 
Method      
 
 
 
Physical Exam:
 
General: Well-developed white male in NAD
Skin: No rash or jaundice
HEENT: Conjunctivae pink, sclerae anicteric, mucous membranes moist
Neck: Without masses or thyromegaly, no supraclavicular or cervical adenopathy
Chest: Few bibasilar rales more prominent on the left
Heart: Regular rate and rhythm without S3 or rub
Abdomen: Soft and nontender without palpable masses or organomegaly
Extremities: Without cyanosis or edema; left foot dressing intact with wound VAC
in place
Neuro: No lateralizing findings, no asterixis or myoclonus
 
 
 
Results
Pertinent Lab Results:
 Laboratory Tests
 
 
 03/26 03/25
 
 1256 0600
 
Chemistry  
 
  Sodium Pending Cancelled
 
  Potassium Pending Cancelled
 
  Chloride Pending Cancelled
 
  Carbon Dioxide Pending Cancelled
 
  Anion Gap Pending Cancelled
 
  BUN Pending Cancelled
 
  Creatinine Pending Cancelled
 
  BUN/Creatinine Ratio Pending Cancelled
 
  Calcium Pending 
 
  Phosphorus Pending 
 
Hematology  
 
  CBC w Diff NO MAN DIFF REQ Cancelled
 
  WBC (4.8 - 10.8 /CUMM) 8.8 Cancelled
 
  RBC (4.70 - 6.10 /CUMM) 4.66  L Cancelled
 
  Hgb (14.0 - 18.0 G/DL) 12.4  L Cancelled
 
  Hct (42 - 52 %) 39.1  L Cancelled
 
  MCV (80.0 - 94.0 FL) 83.9 Cancelled
 
  MCH (27.0 - 31.0 PG) 26.5  L Cancelled
 
  MCHC (33.0 - 37.0 G/DL) 31.6  L Cancelled
 
  RDW (11.5 - 14.5 %) 21.1  H Cancelled
 
  Plt Count (130 - 400 /CUMM) 227 Cancelled
 
  MPV (7.4 - 10.4 FL) 10.7  H Cancelled
 
  Gran % (42.2 - 75.2 %) 78.4  H 
 
  Lymphocytes % (20.5 - 51.1 %) 6.5  L 
 
  Monocytes % (1.7 - 9.3 %) 13.4  H 
 
  Eosinophils % (0 - 5 %) 1.6 
 
  Basophils % (0.0 - 2.0 %) 0.1 
 
  Absolute Granulocytes (1.4 - 6.5 /CUMM) 6.9  H 
 
  Absolute Lymphocytes (1.2 - 3.4 /CUMM) 0.6  L 
 
  Absolute Monocytes (0.10 - 0.60 /CUMM) 1.2  H 
 
  Absolute Eosinophils (0.0 - 0.7 /CUMM) 0.1 
 
  Absolute Basophils (0.0 - 0.2 /CUMM) 0 
 
 
 
 
 03/24 03/24
 
 0745 0600
 
Chemistry  
 
  Sodium (137 - 145 mmol/L) 138 Cancelled
 
  Potassium (3.5 - 5.1 mmol/L) 5.9  H Cancelled
 
  Chloride (98 - 107 mmol/L) 94  L Cancelled
 
  Carbon Dioxide (22 - 30 mmol/L) 19  L Cancelled
 
  Anion Gap (5 - 16) 25  H Cancelled
 
  BUN (9 - 20 mg/dL) 50  H Cancelled
 
  Creatinine (0.7 - 1.2 mg/dL) 11.2 *H Cancelled
 
  Estimated GFR (>60 ml/min) 5  L 
 
  BUN/Creatinine Ratio (7 - 25 %) 4.5  L Cancelled
 
  Calcium (8.4 - 10.2 mg/dL) 7.6  L 
 
Hematology  
 
  CBC w Diff NO MAN DIFF REQ Cancelled
 
  WBC (4.8 - 10.8 /CUMM) 10.6 Cancelled
 
  RBC (4.70 - 6.10 /CUMM) 4.81 Cancelled
 
  Hgb (14.0 - 18.0 G/DL) 13.0  L Cancelled
 
  Hct (42 - 52 %) 40.3  L Cancelled
 
  MCV (80.0 - 94.0 FL) 83.8 Cancelled
 
  MCH (27.0 - 31.0 PG) 27.0 Cancelled
 
  MCHC (33.0 - 37.0 G/DL) 32.2  L Cancelled
 
  RDW (11.5 - 14.5 %) 21.4  H Cancelled
 
  Plt Count (130 - 400 /CUMM) 217 Cancelled
 
  MPV (7.4 - 10.4 FL) 10.5  H Cancelled
 
  Gran % (42.2 - 75.2 %) 75.6  H 
 
  Lymphocytes % (20.5 - 51.1 %) 7.7  L 
 
  Monocytes % (1.7 - 9.3 %) 16.2  H 
 
  Eosinophils % (0 - 5 %) 0.3 
 
  Basophils % (0.0 - 2.0 %) 0.2 
 
  Absolute Granulocytes (1.4 - 6.5 /CUMM) 8.0  H 
 
  Absolute Lymphocytes (1.2 - 3.4 /CUMM) 0.8  L 
 
  Absolute Monocytes (0.10 - 0.60 /CUMM) 1.7  H 
 
  Absolute Eosinophils (0.0 - 0.7 /CUMM) 0 
 
  Absolute Basophils (0.0 - 0.2 /CUMM) 0

## 2018-03-26 NOTE — PN- HOUSESTAFF
**See Addendum**
Subjective
Follow-up For:
Left Foot Ulcer
Subjective:
Patient was seen and examined at bedside. He reports doing okay. States his pain
in the foot is better and well controlled. No active complaints. No issues 
overnight. 
 
Review of Systems
Constitutional:
Reports: no symptoms. 
 
Objective
Last 24 Hrs of Vital Signs/I&O
 Vital Signs
 
 
Date Time Temp Pulse Resp B/P B/P Pulse O2 O2 Flow FiO2
 
     Mean Ox Delivery Rate 
 
 0623 97.6 87 20 102/60  94   
 
 2145 98.2 85 20 92/52  95 Room Air  
 
 1446 97.8 91 19 86/52  94 Room Air  
 
 
 Intake & Output
 
 
  1600  0800  0000
 
Intake Total  240 480
 
Output Total   
 
Balance  240 480
 
    
 
Intake, Oral  240 480
 
 
 
 
Physical Exam
General Appearance: Alert, Oriented X3, No Acute Distress
Skin: No Rashes, No Breakdown
Skin Temp/Moisture Exam: Warm/Dry
Sepsis Skin Exam (color): Normal for Ethnicity
HEENT: Atraumatic
Cardiovascular: Normal S1, Normal S2, No Murmurs
Lungs: Clear to Auscultation, Normal Air Movement
Abdomen: Soft, No Tenderness
Neurological: Normal Speech
Extremities: No Edema, left leg in ace wraps and wound vac
 
Assessment/Plan
Assessment:
51 yo M with PMH of PVD, ESRD maintained on hemodialysis since 2000, HTN, 
hyperparathyroidism, CVA, seizures, gout, GERD, s/p left upper extremity finger 
amputation due to chronic osteomyelitis presented to ED with chief complaint of 
left foot pain and chronic left heel ulcer.
 
Assessment:
 
1. Left foot Ulcer
2. ? Left foot Cellulitis
3. History of ESRD on dialysis
4. History of PVD
 
Plan:
 
* s/p I&D with placement of a wound vac. Per Podiatry patient will be needed to 
be discharged on a wound vac. 
* The wound vac reportedly needs 24 hours to be arranged. Anticipated d/c 
tomorrow. 
* Patient is still completely refused MRI. 
* Watch off antibiotics for now per ID
* s/p Angiogram with angioplasty
* Dialysis today. He can resume his M/W/F schedule from today. 
* Patient states that he receives dialysis at  [facility] [place]. Dr Wolff in [place] his dialysis/renal issues per patient. 
* His blood cultures 1/2 bottles grew diphtheroids ... likely a contaminant. 
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
right.
* Lipid panel suggestive of hyperlipidemia
* TFTs and HbA1c is normal. 
* Continue Oxycontin 15mg BID and Oxycodone 5mg q6p for pain. 
* Continue PPI. 
* Diet: Renal Dialysis diet. 
* DVT Prophylaxis: SC Heparin x 3
* Code: Full Code
Problem List:
 1. Osteomyelitis
 
Pain Ratin
Pain Location:
none
Pain Goal: Remain pain free
Pain Plan:
none
Tomorrow's Labs & Rationales:
CBC, BEP

## 2018-03-26 NOTE — TRANSFER OF CARE SUMMARY
Hospital Course
 
Course
Hospital Course:
51 yo M with PMH of PVD, ESRD maintained on hemodialysis since April 2000, HTN, 
hyperparathyroidism, CVA, seizures, gout, GERD, s/p left upper extremity finger 
amputation due to chronic osteomyelitis presented to ED with chief complaint of 
left foot pain and chronic left heel ulcer.
 
Assessment:
 
1. Left foot Ulcer
2. ? Left foot Cellulitis
3. History of ESRD on dialysis
4. History of PVD
 
Hospital Course:
 
The patient underwent I&D followed by intraoperative administration of negative 
pressure wound therapy with Dr Crocker on 3/20. The chronic nature of the foot 
ulcer raised concerns for osteomyelitis and he was scheduled for an MRI. His ESR
is low and his white count previously elevated on admission had normalized the 
next day. However, for unclear reasons the patient has completely refused an 
MRI. He partially attributes it to claustrophobia. He is unwilling to have the 
procedure even when offered anxiolytic prior to the procedure. 
 
His blood cultures 1/2 bottles grew diphtheroids, which were belived to be a 
contaminant. Per ID the patient has been observed off antibiotics. Podiatry 
requested a consult with Vascular for assessment of his PVD. The patient 
underwent an arterial doppler which was suggestive of  bilateral inflow disease,
left slightly worse than right. On 03/23, he underwent an angiogram followed by 
angioplasty of anterior and posterior tibial artery, dorsalis pedis, peroneal 
artery with vascular surgery. 
 
Due to his refusal of MRI, there can be no assessment for bone infection. The 
patient is stable from a medical standpoint. Per Podiatry patient will be needed
to be discharged on a wound vac. This takes 24 hours to be arranged and the 
patient can be discharged tomorrow 3/27. No pending issues
 
Of note, with his history of ESRD, the patient states that he receives dialysis 
at [facility] [place]. Dr Wolff in [place] his dialysis/renal 
issues per patient. He receives it M/W/F. This schedule is now maintained at the
hospital. 
Assessment/Plan:
as above

## 2018-03-26 NOTE — PN- INFECT DX
Subjective
Subjective:
Afebrile without complaints
 
Objective
Last 24 Hrs of Vital Signs/I&O
 Vital Signs
 
 
Date Time Temp Pulse Resp B/P B/P Pulse O2 O2 Flow FiO2
 
     Mean Ox Delivery Rate 
 
03/26 0623 97.6 87 20 102/60  94   
 
03/25 2145 98.2 85 20 92/52  95 Room Air  
 
 
 Intake & Output
 
 
 03/26 1600 03/26 0800 03/26 0000
 
Intake Total 610 240 480
 
Output Total   
 
Balance 610 240 480
 
    
 
Intake, IV 10  
 
Intake, Oral 600 240 480
 
Number 0  
 
Bowel   
 
Movements   
 
 
 
 
Physical Exam
Other Physical Findings:
He appears comfortable in no acute distress
Extremities left foot dressing intact, with wound VAC in place
 
 
Results
Last 24 Hours of Lab Results:
 Laboratory Tests
 
 
 03/26
 
 1256
 
Chemistry 
 
  Sodium (137 - 145 mmol/L) 137
 
  Potassium (3.5 - 5.1 mmol/L) 5.3  H
 
  Chloride (98 - 107 mmol/L) 92  L
 
  Carbon Dioxide (22 - 30 mmol/L) 21  L
 
  Anion Gap (5 - 16) 24  H
 
  BUN (9 - 20 mg/dL) 57  H
 
  Creatinine (0.7 - 1.2 mg/dL) 10.6 *H
 
  Estimated GFR (>60 ml/min) 5  L
 
  BUN/Creatinine Ratio (7 - 25 %) 5.4  L
 
  Calcium (8.4 - 10.2 mg/dL) 7.6  L
 
  Phosphorus (2.5 - 4.5 mg/dL) 7.8  H
 
Hematology 
 
  CBC w Diff NO MAN DIFF REQ
 
  WBC (4.8 - 10.8 /CUMM) 8.8
 
  RBC (4.70 - 6.10 /CUMM) 4.66  L
 
  Hgb (14.0 - 18.0 G/DL) 12.4  L
 
  Hct (42 - 52 %) 39.1  L
 
  MCV (80.0 - 94.0 FL) 83.9
 
  MCH (27.0 - 31.0 PG) 26.5  L
 
  MCHC (33.0 - 37.0 G/DL) 31.6  L
 
  RDW (11.5 - 14.5 %) 21.1  H
 
  Plt Count (130 - 400 /CUMM) 227
 
  MPV (7.4 - 10.4 FL) 10.7  H
 
  Gran % (42.2 - 75.2 %) 78.4  H
 
  Lymphocytes % (20.5 - 51.1 %) 6.5  L
 
  Monocytes % (1.7 - 9.3 %) 13.4  H
 
  Eosinophils % (0 - 5 %) 1.6
 
  Basophils % (0.0 - 2.0 %) 0.1
 
  Absolute Granulocytes (1.4 - 6.5 /CUMM) 6.9  H
 
  Absolute Lymphocytes (1.2 - 3.4 /CUMM) 0.6  L
 
  Absolute Monocytes (0.10 - 0.60 /CUMM) 1.2  H
 
  Absolute Eosinophils (0.0 - 0.7 /CUMM) 0.1
 
  Absolute Basophils (0.0 - 0.2 /CUMM) 0
 
 
 
Last 24 Hours of Arnold Results:
No new cultures
 
 
Assessment/Plan ID
Impression:
Stable, with temperatures and white blood cell count remaining normal, off 
antibiotics status post angioplasty of the left anterior tibial, posterior 
tibial, peroneal and dorsalis pedis artery 3 days ago.  He is now 6 days status 
post I&D of his left heel ulcer, with no evidence of extension to the bone, but 
with his x-ray suggesting possible osteomyelitis. Unfortunately, he was not able
to tolerate the MRI; therefore he will need to be followed clinically with 
serial x-rays and consideration of a bone biopsy if his x-ray shows progression 
or his ulcer does not heal.  
 
Suggestion:
1.  Further management of his left heel ulcer per Podiatry
2.  Will need to pursue a bone biopsy, as noted above, if his wound does not 
heal or x-ray shows progression
3.  Continue to follow off antibiotics pending above

## 2018-03-27 VITALS — DIASTOLIC BLOOD PRESSURE: 60 MMHG | SYSTOLIC BLOOD PRESSURE: 98 MMHG

## 2018-03-27 VITALS — SYSTOLIC BLOOD PRESSURE: 96 MMHG | DIASTOLIC BLOOD PRESSURE: 58 MMHG

## 2018-03-27 LAB
ABSOLUTE GRANULOCYTE CT: 5.3 /CUMM (ref 1.4–6.5)
BASOPHILS # BLD: 0 /CUMM (ref 0–0.2)
BASOPHILS NFR BLD: 0.5 % (ref 0–2)
EOSINOPHIL # BLD: 0.1 /CUMM (ref 0–0.7)
EOSINOPHIL NFR BLD: 1.7 % (ref 0–5)
ERYTHROCYTE [DISTWIDTH] IN BLOOD BY AUTOMATED COUNT: 20.8 % (ref 11.5–14.5)
GRANULOCYTES NFR BLD: 73.3 % (ref 42.2–75.2)
HCT VFR BLD CALC: 40.5 % (ref 42–52)
LYMPHOCYTES # BLD: 0.6 /CUMM (ref 1.2–3.4)
MCH RBC QN AUTO: 27 PG (ref 27–31)
MCHC RBC AUTO-ENTMCNC: 31.9 G/DL (ref 33–37)
MCV RBC AUTO: 84.5 FL (ref 80–94)
MONOCYTES # BLD: 1.2 /CUMM (ref 0.1–0.6)
PLATELET # BLD: 207 /CUMM (ref 130–400)
PMV BLD AUTO: 10.1 FL (ref 7.4–10.4)
RED BLOOD CELL CT: 4.79 /CUMM (ref 4.7–6.1)
WBC # BLD AUTO: 7.2 /CUMM (ref 4.8–10.8)

## 2018-03-27 NOTE — PN- HOUSESTAFF
**See Addendum**
Subjective
Follow-up For:
Left heel osteo
Subjective:
No overnight events. Patient feels ok this morning. He has no fever, chills, 
night sweats, CP, SOB, abd pain or other issues. He does not produce urine. He 
does not want to go home with the wound vac but is amenable it seems.
 
Review of Systems
Constitutional:
Reports: no symptoms. 
EENTM:
Reports: no symptoms. 
Cardiovascular:
Reports: no symptoms. 
Respiratory:
Reports: no symptoms. 
Gastrointestinal:
Reports: no symptoms. 
Genitourinary:
Reports: no symptoms. 
Musculoskeletal:
Reports: see HPI. 
Skin:
Reports: no symptoms. 
Neurological/Psychological:
Reports: no symptoms. 
Hematologic/Endocrine:
Reports: no symptoms. 
Immunologic/Allergic:
Reports: no symptoms. 
 
Objective
Last 24 Hrs of Vital Signs/I&O
 Vital Signs
 
 
Date Time Temp Pulse Resp B/P B/P Pulse O2 O2 Flow FiO2
 
     Mean Ox Delivery Rate 
 
 0638 98.0 47 20 98/60  95   
 
 1743 98.8 114 20 100/60  97   
 
 
 Intake & Output
 
 
  0800  0000  1600
 
Intake Total  510 610
 
Output Total  1800 
 
Balance  -1290 610
 
    
 
Intake, IV  10 10
 
Intake, Oral  500 600
 
Number  0 0
 
Bowel   
 
Movements   
 
Output,  1800 
 
Dialysate   
 
Output, Urine  0 
 
Patient 91.172 kg  
 
Weight   
 
Weight Bed scale  
 
Measurement   
 
Method   
 
 
 
 
Physical Exam
General Appearance: Alert, Oriented X3, Cooperative, No Acute Distress
Cardiovascular: Regular Rate, Normal S1, Normal S2
Lungs: Clear to Auscultation
Abdomen: Normal Bowel Sounds, Soft, No Tenderness
Extremities: Left foot wrapped with postoperative changes and wound vac draining
brown fluid. 
Current Medications:
 Current Medications
 
 
  Sig/Jabari Start time  Last
 
Medication Dose Route Stop Time Status Admin
 
Aspirin Buffered 81 MG DAILY  1030 AC 
 
  PO   1208
 
Atorvastatin Calcium 40 MG 1700  1700 AC 
 
  PO   
 
Docusate Sodium 100 MG DAILY AS NEEDED PRN  2345 AC 
 
  PO   
 
Heparin Sodium  5,000 UNIT Q8  0050 AC 
 
(Porcine)  SC   0555
 
Omeprazole 40 MG DAILY AC  0700 AC 
 
  PO   0556
 
Oxycodone HCl 15 MG Q12  1000 AC 
 
  PO   2258
 
Oxycodone HCl 5 MG Q6P PRN  0915 AC 03/27
 
  PO   0559
 
Polyethylene Glycol 17 GM DAILY  1000 AC 
 
  PO   
 
Senna 187 MG AT BEDTIME  2200 AC 
 
  PO   2258
 
 
 
 
Last 24 Hrs of Lab/Arnold Results
Last 24 Hrs of Labs/Mics:
 Laboratory Tests
 
18 1256:
Anion Gap 24  H, Estimated GFR 5  L, BUN/Creatinine Ratio 5.4  L, Calcium 7.6  L
, Phosphorus 7.8  H, CBC w Diff NO MAN DIFF REQ, RBC 4.66  L, MCV 83.9, MCH 26.5
 L, MCHC 31.6  L, RDW 21.1  H, MPV 10.7  H, Gran % 78.4  H, Lymphocytes % 6.5  L
, Monocytes % 13.4  H, Eosinophils % 1.6, Basophils % 0.1, Absolute Granulocytes
6.9  H, Absolute Lymphocytes 0.6  L, Absolute Monocytes 1.2  H, Absolute 
Eosinophils 0.1, Absolute Basophils 0
 
 
Assessment/Plan
Assessment:
51 yo M with PMH of PVD, ESRD maintained on hemodialysis since 2000, HTN, 
hyperparathyroidism, CVA, seizures, gout, GERD, s/p left upper extremity finger 
amputation due to chronic osteomyelitis here with left heel osteomyelitis.
 
Problem List:
1. Left heel osteomyelitis
2. End-stage renal disease
 
#Left heel osteomyelitis: Patient is POD 7 s/p I&D with placement of a wound 
vac. Per Podiatry patient will be needed to be discharged on a wound vac. He is 
status post angiogram with angioplasty. He will be discharged today.
-Appreciate ID recs
-Continue wound care
-Continue Oxycontin 15mg BID and Oxycodone 5mg q6p for pain.
 
#ESRD: MWF schedule. Patient states that he receives dialysis at  [facility] [place]. Dr Wolff in [place] his dialysis/renal issues per 
patient. 
-Continue dialysis MWF
 
#Chronic medical problems:
-Continue home medications
 
DVT prophylaxis with heparin
Renal dialysis diet
Full code
 
Problem List:
 1. Osteomyelitis
 
Pain Ratin
Pain Location:
Left heel
Pain Goal: Remain pain free
Pain Plan:
see a/p
Tomorrow's Labs & Rationales:
none

## 2018-03-28 VITALS — SYSTOLIC BLOOD PRESSURE: 100 MMHG | DIASTOLIC BLOOD PRESSURE: 60 MMHG

## 2018-03-28 LAB
ABSOLUTE GRANULOCYTE CT: 5.2 /CUMM (ref 1.4–6.5)
BASOPHILS # BLD: 0 /CUMM (ref 0–0.2)
BASOPHILS NFR BLD: 0.4 % (ref 0–2)
EOSINOPHIL # BLD: 0 /CUMM (ref 0–0.7)
EOSINOPHIL NFR BLD: 0.4 % (ref 0–5)
ERYTHROCYTE [DISTWIDTH] IN BLOOD BY AUTOMATED COUNT: 20.6 % (ref 11.5–14.5)
GRANULOCYTES NFR BLD: 72.4 % (ref 42.2–75.2)
HCT VFR BLD CALC: 41.5 % (ref 42–52)
LYMPHOCYTES # BLD: 0.6 /CUMM (ref 1.2–3.4)
MCH RBC QN AUTO: 26.3 PG (ref 27–31)
MCHC RBC AUTO-ENTMCNC: 31.2 G/DL (ref 33–37)
MCV RBC AUTO: 84.2 FL (ref 80–94)
MONOCYTES # BLD: 1.3 /CUMM (ref 0.1–0.6)
PLATELET # BLD: 225 /CUMM (ref 130–400)
PMV BLD AUTO: 10.9 FL (ref 7.4–10.4)
RED BLOOD CELL CT: 4.93 /CUMM (ref 4.7–6.1)
WBC # BLD AUTO: 7.1 /CUMM (ref 4.8–10.8)

## 2018-03-28 NOTE — PN- HOUSESTAFF
**See Addendum**
Subjective
Follow-up For:
Osteomyelitis
Subjective:
No overnight events. He reports no issues overnight and has no complaints this 
morning.
 
Review of Systems
Constitutional:
Reports: no symptoms. 
EENTM:
Reports: no symptoms. 
Cardiovascular:
Reports: no symptoms. 
Respiratory:
Reports: no symptoms. 
Gastrointestinal:
Reports: no symptoms. 
Genitourinary:
Reports: no symptoms. 
Musculoskeletal:
Reports: no symptoms. 
Skin:
Reports: no symptoms. 
Neurological/Psychological:
Reports: no symptoms. 
Hematologic/Endocrine:
Reports: no symptoms. 
Immunologic/Allergic:
Reports: no symptoms. 
 
Objective
Last 24 Hrs of Vital Signs/I&O
 Vital Signs
 
 
Date Time Temp Pulse Resp B/P B/P Pulse O2 O2 Flow FiO2
 
     Mean Ox Delivery Rate 
 
 1429 98.4 93 16 96/58  92 Room Air  
 
 0830  85       
 
 
 Intake & Output
 
 
  0800  0000  1600
 
Intake Total 120 240 500
 
Output Total   
 
Balance 120 240 500
 
    
 
Intake, Oral 120 240 500
 
Number  0 
 
Bowel   
 
Movements   
 
 
 
 
Physical Exam
General Appearance: Alert, Oriented X3, Cooperative, No Acute Distress
Cardiovascular: Regular Rate
Lungs: Clear to Auscultation
Extremities: Left foot wrapped with wound vac
Current Medications:
 Current Medications
 
 
  Sig/Jaabri Start time  Last
 
Medication Dose Route Stop Time Status Admin
 
Aspirin Buffered 81 MG DAILY  1030 AC 
 
  PO   1147
 
Atorvastatin Calcium 40 MG 1700  1700 AC 
 
  PO   
 
Docusate Sodium 100 MG DAILY AS NEEDED PRN  2345 AC 
 
  PO   
 
Heparin Sodium  5,000 UNIT Q8  0050 AC 
 
(Porcine)  SC   0623
 
Omeprazole 40 MG DAILY AC  0700 AC 
 
  PO   0623
 
Oxycodone HCl 15 MG Q12  1000 AC 
 
  PO   2101
 
Oxycodone HCl 5 MG Q6P PRN  0915 AC 
 
  PO   1813
 
Patient Medication  1 ED ONE ONE  1600 DC 
 
Teaching  ED  1601  1813
 
Polyethylene Glycol 17 GM DAILY  1000 AC 
 
  PO   
 
Senna 187 MG AT BEDTIME  2200 AC 
 
  PO   2100
 
 
 
 
Last 24 Hrs of Lab/Arnold Results
Last 24 Hrs of Labs/Mics:
 Laboratory Tests
 
18 0734:
Anion Gap 17  H, Estimated GFR 8  L, BUN/Creatinine Ratio 4.0  L, CBC w Diff NO 
MAN DIFF REQ, RBC 4.79, MCV 84.5, MCH 27.0, MCHC 31.9  L, RDW 20.8  H, MPV 10.1,
Gran % 73.3, Lymphocytes % 8.0  L, Monocytes % 16.5  H, Eosinophils % 1.7, 
Basophils % 0.5, Absolute Granulocytes 5.3, Absolute Lymphocytes 0.6  L, 
Absolute Monocytes 1.2  H, Absolute Eosinophils 0.1, Absolute Basophils 0
 
 
Assessment/Plan
Assessment:
51 yo M with PMH of PVD, ESRD maintained on hemodialysis since 2000, HTN, 
hyperparathyroidism, CVA, seizures, gout, GERD, s/p left upper extremity finger 
amputation due to chronic osteomyelitis here with left heel osteomyelitis.
 
Problem List:
1. Left heel osteomyelitis
2. End-stage renal disease
 
#Left heel osteomyelitis: Patient is POD 8 s/p I&D with placement of a wound 
vac. Per Podiatry patient will be needed to be discharged on a wound vac. He is 
status post angiogram with angioplasty. He was going to be discharged yesterday 
but there issues with a wound VAC. He is stable for discharge today.
-Appreciate ID recs
-Continue wound care
-Continue Oxycontin 15mg BID and Oxycodone 5mg q6p for pain.
-Follow up with podiatry and vascular surgery
 
#ESRD: MWF schedule. Patient states that he receives dialysis at  [facility] [place]. Dr Wolff in [place] his dialysis/renal issues per 
patient. 
-Continue dialysis MWF
-Follow up with nephrology
 
#Chronic medical problems:
-Continue home medications
 
DVT prophylaxis with heparin
Renal dialysis diet
Full code
Problem List:
 1. Osteomyelitis
 
Pain Ratin
Pain Location:
no
Pain Goal: Remain pain free
Pain Plan:
se ea/p
Tomorrow's Labs & Rationales:
no

## 2018-03-28 NOTE — PN- NEPHROLOGY
Assessment/Plan Nephrology
Assessment:
 
1.  ESRD
 
2.  Peripheral vascular disease with ulcer left heel, now with wound VAC, status
post angioplasty of left anterior tibial, posterior tibial, peroneal, and 
dorsalis pedis arteries
 
 
Suggestion:
 
1.  Hemodialysis today in progress with ultrafiltration to target weight
 
2.  Okay for discharge from renal standpoint
 
 
 
 
 
Subjective
Subjective:
 
No complaints.  Seen with hemodialysis.
 
 
 
Objective
Vital Signs and I&Os
Vital Signs
 
 
Date Time Temp Pulse Resp B/P B/P Pulse O2 O2 Flow FiO2
 
     Mean Ox Delivery Rate 
 
03/28 0705 99.3 98 20 100/60  93 Room Air  
 
03/27 1429 98.4 93 16 96/58  92 Room Air  
 
 
 Intake & Output
 
 
 03/28 1600 03/28 0400 03/27 1600 03/27 0400 03/26 1600 03/26 0400
 
Intake Total 120 240 620 510 850 480
 
Output Total    1800  
 
Balance 120 240 620 -1290 850 480
 
       
 
Intake, IV    10 10 
 
Intake, Oral 120 240 620 500 840 480
 
Number  0  0 0 
 
Bowel      
 
Movements      
 
Output,    1800  
 
Dialysate      
 
Output, Urine    0  
 
Patient 202 lb  201 lb   
 
Weight      
 
Weight Bed scale  Bed scale   
 
Measurement      
 
Method      
 
 
 
Physical Exam:
 
General: Well-developed white male in NAD
Skin: No rash or jaundice
HEENT: Conjunctivae pink, sclerae anicteric, mucous membranes moist
Neck: Without masses or thyromegaly, no supraclavicular or cervical adenopathy
Chest: Few bibasilar rales more prominent on the left
Heart: Regular rate and rhythm without S3 or rub
Abdomen: Soft and nontender without palpable masses or organomegaly
Extremities: Without cyanosis or edema; left foot dressing intact with wound VAC
in place
Neuro: No lateralizing findings, no asterixis or myoclonus
 
 
Results
Pertinent Lab Results:
 Laboratory Tests
 
 
 03/27 03/26
 
 0734 1256
 
Chemistry  
 
  Sodium (137 - 145 mmol/L) 141 137
 
  Potassium (3.5 - 5.1 mmol/L) 4.4 5.3  H
 
  Chloride (98 - 107 mmol/L) 97  L 92  L
 
  Carbon Dioxide (22 - 30 mmol/L) 27 21  L
 
  Anion Gap (5 - 16) 17  H 24  H
 
  BUN (9 - 20 mg/dL) 28  H 57  H
 
  Creatinine (0.7 - 1.2 mg/dL) 7.0 *H 10.6 *H
 
  Estimated GFR (>60 ml/min) 8  L 5  L
 
  BUN/Creatinine Ratio (7 - 25 %) 4.0  L 5.4  L
 
  Calcium (8.4 - 10.2 mg/dL)  7.6  L
 
  Phosphorus (2.5 - 4.5 mg/dL)  7.8  H
 
Hematology  
 
  CBC w Diff NO MAN DIFF REQ NO MAN DIFF REQ
 
  WBC (4.8 - 10.8 /CUMM) 7.2 8.8
 
  RBC (4.70 - 6.10 /CUMM) 4.79 4.66  L
 
  Hgb (14.0 - 18.0 G/DL) 12.9  L 12.4  L
 
  Hct (42 - 52 %) 40.5  L 39.1  L
 
  MCV (80.0 - 94.0 FL) 84.5 83.9
 
  MCH (27.0 - 31.0 PG) 27.0 26.5  L
 
  MCHC (33.0 - 37.0 G/DL) 31.9  L 31.6  L
 
  RDW (11.5 - 14.5 %) 20.8  H 21.1  H
 
  Plt Count (130 - 400 /CUMM) 207 227
 
  MPV (7.4 - 10.4 FL) 10.1 10.7  H
 
  Gran % (42.2 - 75.2 %) 73.3 78.4  H
 
  Lymphocytes % (20.5 - 51.1 %) 8.0  L 6.5  L
 
  Monocytes % (1.7 - 9.3 %) 16.5  H 13.4  H
 
  Eosinophils % (0 - 5 %) 1.7 1.6
 
  Basophils % (0.0 - 2.0 %) 0.5 0.1
 
  Absolute Granulocytes (1.4 - 6.5 /CUMM) 5.3 6.9  H
 
  Absolute Lymphocytes (1.2 - 3.4 /CUMM) 0.6  L 0.6  L
 
  Absolute Monocytes (0.10 - 0.60 /CUMM) 1.2  H 1.2  H
 
  Absolute Eosinophils (0.0 - 0.7 /CUMM) 0.1 0.1
 
  Absolute Basophils (0.0 - 0.2 /CUMM) 0 0
 
 
 
 
 03/26
 
 0600
 
Chemistry 
 
  Sodium Cancelled
 
  Potassium Cancelled
 
  Chloride Cancelled
 
  Carbon Dioxide Cancelled
 
  Anion Gap Cancelled
 
  BUN Cancelled
 
  Creatinine Cancelled
 
  BUN/Creatinine Ratio Cancelled
 
Hematology 
 
  CBC w Diff Cancelled
 
  WBC Cancelled
 
  RBC Cancelled
 
  Hgb Cancelled
 
  Hct Cancelled
 
  MCV Cancelled
 
  MCH Cancelled
 
  MCHC Cancelled
 
  RDW Cancelled
 
  Plt Count Cancelled
 
  MPV Cancelled

## 2018-03-29 VITALS — SYSTOLIC BLOOD PRESSURE: 88 MMHG | DIASTOLIC BLOOD PRESSURE: 63 MMHG

## 2018-03-29 NOTE — PN- HOUSESTAFF
**See Addendum**
Subjective
Follow-up For:
osteomyeltis
Subjective:
No overnight events. Patient had dialysis yesterday. He slept well, no 
complaints.
 
Review of Systems
Constitutional:
Reports: no symptoms. 
EENTM:
Reports: no symptoms. 
Cardiovascular:
Reports: no symptoms. 
Respiratory:
Reports: no symptoms. 
Gastrointestinal:
Reports: no symptoms. 
Genitourinary:
Reports: no symptoms. 
Musculoskeletal:
Reports: no symptoms. 
Skin:
Reports: no symptoms. 
Neurological/Psychological:
Reports: no symptoms. 
Hematologic/Endocrine:
Reports: no symptoms. 
Immunologic/Allergic:
Reports: no symptoms. 
 
Objective
Last 24 Hrs of Vital Signs/I&O
 Vital Signs
 
 
Date Time Temp Pulse Resp B/P B/P Pulse O2 O2 Flow FiO2
 
     Mean Ox Delivery Rate 
 
 0704 98.8 94 18 88/63  95 Room Air  
 
 
 Intake & Output
 
 
  0800  0000  1600
 
Intake Total 120 480 300
 
Output Total   0
 
Balance 120 480 300
 
    
 
Intake, Oral 120 480 300
 
Number   0
 
Bowel   
 
Movements   
 
Output, Urine   0
 
Patient 91.626 kg  
 
Weight   
 
Weight Bed scale  
 
Measurement   
 
Method   
 
 
 
 
Physical Exam
General Appearance: Alert, Oriented X3, Cooperative, No Acute Distress
Cardiovascular: Regular Rate, Normal S1, Normal S2
Lungs: Clear to Auscultation
Extremities: LEft foot wrapped with wound vac
Current Medications:
 Current Medications
 
 
  Sig/Jabari Start time  Last
 
Medication Dose Route Stop Time Status Admin
 
Aspirin Buffered 81 MG DAILY  1030 AC 
 
  PO   1147
 
Atorvastatin Calcium 40 MG 1700  1700 AC 
 
  PO   
 
Docusate Sodium 100 MG DAILY AS NEEDED PRN  2345 AC 
 
  PO   
 
Heparin Sodium  5,000 UNIT Q8  0050 AC 
 
(Porcine)  SC   0559
 
Omeprazole 40 MG DAILY AC  0700 AC 
 
  PO   0559
 
Oxycodone HCl 15 MG Q12  1000 DC 
 
  PO   2101
 
Oxycodone HCl 5 MG Q6P PRN  0915 DC 
 
  PO   1813
 
Patient Medication  1 ED ONE ONE  1100 DC 
 
Teaching  ED  1101  
 
Polyethylene Glycol 17 GM DAILY  1000 AC 
 
  PO   
 
Senna 187 MG AT BEDTIME  2200 AC 
 
  PO   2100
 
 
 
 
Last 24 Hrs of Lab/Arnold Results
Last 24 Hrs of Labs/Mics:
 Laboratory Tests
 
18 1732:
 
 
18 1330:
Anion Gap 24  H, Estimated GFR 5  L, BUN/Creatinine Ratio 4.5  L, Glucose 87, 
Calcium 7.7  L, Phosphorus 6.3  H, Magnesium 1.9, Albumin 3.7, CBC w Diff NO MAN
DIFF REQ, RBC 4.93, MCV 84.2, MCH 26.3  L, MCHC 31.2  L, RDW 20.6  H, MPV 10.9  
H, Gran % 72.4, Lymphocytes % 8.7  L, Monocytes % 18.1  H, Eosinophils % 0.4, 
Basophils % 0.4, Absolute Granulocytes 5.2, Absolute Lymphocytes 0.6  L, 
Absolute Monocytes 1.3  H, Absolute Eosinophils 0, Absolute Basophils 0
 
 
Assessment/Plan
Assessment:
53 yo M with PMH of PVD, ESRD maintained on hemodialysis since 2000, HTN, 
hyperparathyroidism, CVA, seizures, gout, GERD, s/p left upper extremity finger 
amputation due to chronic osteomyelitis here with left heel osteomyelitis.
 
Problem List:
1. Left heel osteomyelitis
2. End-stage renal disease
 
#Left heel osteomyelitis: Patient is POD 9 s/p I&D with placement of a wound 
vac. Per Podiatry patient will be needed to be discharged on a wound vac. He is 
status post angiogram with angioplasty. He was going to be discharged yesterday 
but there issues with a wound VAC. He is stable for discharge today. His 
discharge has been held up due to issues obtaining the wound VAC for home.
-Appreciate ID recs
-Continue wound care
-Continue Oxycontin 15mg BID and Oxycodone 5mg q6p for pain.
-Follow up with podiatry and vascular surgery
 
#ESRD: MWF schedule. Patient states that he receives dialysis at  [facility] [place]. Dr Wolff in [place] his dialysis/renal issues per 
patient. 
-Continue dialysis MWF
-Follow up with nephrology
 
#Chronic medical problems:
-Continue home medications
 
DVT prophylaxis with heparin
Renal dialysis diet
Full code
Problem List:
 1. Osteomyelitis
 
Pain Ratin
Pain Location:
no
Pain Goal: Remain pain free
Pain Plan:
see a/p
Tomorrow's Labs & Rationales:
no

## 2018-04-01 ENCOUNTER — HOSPITAL ENCOUNTER (INPATIENT)
Dept: HOSPITAL 68 - ERH | Age: 53
LOS: 3 days | Discharge: NURSING, REHAB OR LONG-TERM CARE | DRG: 682 | End: 2018-04-04
Attending: INTERNAL MEDICINE
Payer: COMMERCIAL

## 2018-04-01 VITALS — BODY MASS INDEX: 22.47 KG/M2 | HEIGHT: 73 IN | WEIGHT: 169.56 LBS

## 2018-04-01 VITALS — DIASTOLIC BLOOD PRESSURE: 58 MMHG | SYSTOLIC BLOOD PRESSURE: 102 MMHG

## 2018-04-01 VITALS — DIASTOLIC BLOOD PRESSURE: 60 MMHG | SYSTOLIC BLOOD PRESSURE: 110 MMHG

## 2018-04-01 DIAGNOSIS — E21.3: ICD-10-CM

## 2018-04-01 DIAGNOSIS — G89.18: ICD-10-CM

## 2018-04-01 DIAGNOSIS — I12.0: Primary | ICD-10-CM

## 2018-04-01 DIAGNOSIS — L97.429: ICD-10-CM

## 2018-04-01 DIAGNOSIS — J90: ICD-10-CM

## 2018-04-01 DIAGNOSIS — Z99.2: ICD-10-CM

## 2018-04-01 DIAGNOSIS — I73.9: ICD-10-CM

## 2018-04-01 DIAGNOSIS — Z91.15: ICD-10-CM

## 2018-04-01 DIAGNOSIS — N18.6: ICD-10-CM

## 2018-04-01 DIAGNOSIS — Z86.73: ICD-10-CM

## 2018-04-01 DIAGNOSIS — M86.641: ICD-10-CM

## 2018-04-01 DIAGNOSIS — I73.89: ICD-10-CM

## 2018-04-01 DIAGNOSIS — M10.9: ICD-10-CM

## 2018-04-01 DIAGNOSIS — Z79.82: ICD-10-CM

## 2018-04-01 DIAGNOSIS — Z91.041: ICD-10-CM

## 2018-04-01 DIAGNOSIS — Z89.022: ICD-10-CM

## 2018-04-01 DIAGNOSIS — G43.909: ICD-10-CM

## 2018-04-01 LAB
ABSOLUTE GRANULOCYTE CT: 4.9 /CUMM (ref 1.4–6.5)
BASOPHILS # BLD: 0 /CUMM (ref 0–0.2)
BASOPHILS NFR BLD: 0.3 % (ref 0–2)
EOSINOPHIL # BLD: 0 /CUMM (ref 0–0.7)
EOSINOPHIL NFR BLD: 0.3 % (ref 0–5)
ERYTHROCYTE [DISTWIDTH] IN BLOOD BY AUTOMATED COUNT: 20.6 % (ref 11.5–14.5)
GRANULOCYTES NFR BLD: 79.5 % (ref 42.2–75.2)
HCT VFR BLD CALC: 48.3 % (ref 42–52)
LYMPHOCYTES # BLD: 0.4 /CUMM (ref 1.2–3.4)
MCH RBC QN AUTO: 26.1 PG (ref 27–31)
MCHC RBC AUTO-ENTMCNC: 31.3 G/DL (ref 33–37)
MCV RBC AUTO: 83.2 FL (ref 80–94)
MONOCYTES # BLD: 0.8 /CUMM (ref 0.1–0.6)
PLATELET # BLD: 212 /CUMM (ref 130–400)
PMV BLD AUTO: 11.8 FL (ref 7.4–10.4)
RED BLOOD CELL CT: 5.8 /CUMM (ref 4.7–6.1)
WBC # BLD AUTO: 6.2 /CUMM (ref 4.8–10.8)

## 2018-04-01 PROCEDURE — 2NASP: CPT

## 2018-04-01 NOTE — HISTORY & PHYSICAL
Timur BEDOLLA,Kenia 04/01/18 1503:
General Information and HPI
MD Statement:
I have seen and personally examined JC CANDELARIA and documented this H&P.
 
The patient is a 52 year old M who presented with a patient stated chief 
complaint of [difficulty ambulation].
 
Source of Information: patient, EMS
Exam Limitations: no limitations
History of Present Illness:
Patient is a 51 YO NONDIABETIC Male with PMH significant for ESRD on dialysis 
Monday/Wednesday/Friday, recent admission to [facility] for suspected left foot 
osteomyelitis from chornic ulcer. He was discharged on thursday and never able 
to get up after his discharge. He was not able to make to dialysis on Friday as 
he was unable to walk.  Apparently his discharge plan was to discharge him to 
home with nursing facility.  He refused home nursing care, remained on his couch
for the past 3 days after discharge.  Today apparently home care came home and 
requested to start working, encouraged him to get out of the bed.  Patient 
refused reporting he is having difficulty ambulating and feeling dizzy upon 
standing out of the bed.  Finally home health people called 911 and brought him 
by ambulance to the ER. 
 
He missed his dialysis session on Friday.
 
Past medical history
And states her disease diagnosed in 2000, GI bleed remotely, CVA, peripheral 
vascular disease requiring and requesting his recent admission.
 
Allergies
Iodinated contrast
 
Medications
On Nexium
 
Social
No smoking/alcohol/drugs
 
Surgical
Amputation of left index finger due to osteoarthritis and gangrene
 
Allergies/Medications
Allergies:
Coded Allergies:
Iodinated Contrast- Oral and IV Dye (IODINATED CONTRAST MEDIA - IV DYE) (RASH 03
/19/18)
 
Home Med list
Aspirin (Ecotrin*) 81 MG TABLET.   81 MG PO DAILY Heart
Atorvastatin Calcium 40 MG TABLET   40 MG PO 1700 Cholesterol
Omeprazole 20 MG CAPSULE.   1 CAP PO DAILY GI  (Reported)
 
Compliance With Home Meds: GOOD
 
Past History
 
Travel History
Traveled to [place] past 21 day No
 
Medical History
Neurological: CVA, migraine, seizure
EENT: NONE
Cardiovascular: hypertension
Respiratory: collapsed left lung with a persistent left pleural effusion.
Gastrointestinal: GERD, GI BLEED
Hepatic: NONE
Renal: ESRD on HD, DIALYSIS M-W-F ACCESS L LEG
Musculoskeletal: gout, osteoarthritis, secondary OSTEOPOROSIS GANGREEN L 2ND 
FINGER
Psychiatric: NONE
Endocrine: hyperparathyroidism
Blood Disorders: NONE
Cancer(s): NONE
GYN/Reproductive: NONE
Other Medical Hx:
In 2004 he had an infected abscess with MSSA bacteremia.
Complete central venous occlusion in 2007.
History of MRSA: No
History of VRE: No
History of CDIFF: No
Isolation History: Standard
Influenza Vaccine: 09/01/17
 
Surgical History
Surgical History: GRAFT, LEFT THIGH s/p placement of numerous August catheters/also
dual-lumen Amor's left radial cephalic primary AVF on 03/28/2000 left upper 
arm AVG Tenckhoff catheter placement and subsequent removal status post 
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
 
Past Family/Social History
 
Family History
Relations & Conditions if any
MOTHER (Heart Dz, Breast Ca).
FATHER (Diabetes).
Relation not specified for:
  FH: stroke
 
 
Psychosocial History
Who Do You Live With? self
Services at Home: Nursing
Primary Language: English
ETOH Use: denies use
Illicit Drug Use: denies illicit drug use
 
Functional Ability
ADLs
Independent: dressing, eating. 
Ambulation: cane
IADLs
Independent: shopping, housework, finances. 
 
Review of Systems
 
Review of Systems
Constitutional:
Reports: see HPI. 
EENTM:
Reports: no symptoms. 
Cardiovascular:
Reports: no symptoms. 
Respiratory:
Reports: no symptoms. 
GI:
Reports: no symptoms. 
Genitourinary:
Reports: no symptoms. 
Musculoskeletal:
Reports: no symptoms. 
Skin:
Reports: no symptoms. 
Neurological/Psychological:
Reports: no symptoms. 
 
Exam & Diagnostic Data
Last 24 Hrs of Vital Signs/I&O
 Vital Signs
 
 
Date Time Temp Pulse Resp B/P B/P Pulse O2 O2 Flow FiO2
 
     Mean Ox Delivery Rate 
 
04/01 1258      97   
 
04/01 1257 96.0 113 18 96/69  94 Room Air  
 
 
 Intake & Output
 
 
 04/01 1600 04/01 0800 04/01 0000
 
Intake Total   
 
Output Total   
 
Balance   
 
    
 
Patient 77.111 kg  
 
Weight   
 
Weight Estimated  
 
Measurement   
 
Method   
 
 
 
 
Physical Exam
General Appearance Alert, Oriented X3, Cooperative, No Acute Distress
Skin No Rashes, No Breakdown
Skin Temp/Moisture Exam: Warm/Dry
HEENT Atraumatic, PERRLA, EOMI
Neck Supple, No JVD
Cardiovascular Normal S1, Normal S2
Lungs Clear to Auscultation, Normal Air Movement
Abdomen Normal Bowel Sounds, Soft, No Tenderness
Neurological Normal Speech, Normal Tone, Sensation Intact
Extremities No Clubbing, No Cyanosis, dressing present in the left heel region 
with wound VAC in, pulsations present, cold to touch, mildly erythematous and 
tender to touch
Vascular Normal Pulses
Body Front and Back (Adult)
 
  1) Chronic nonhealing ulcer
  2) Amputation of left index finger
Last 24 Hrs of Labs/Arnold:
 Laboratory Tests
 
04/01/18 1407:
Anion Gap 29  H, Estimated GFR 4  L, BUN/Creatinine Ratio 4.5  L, Glucose 83, 
Calcium 8.0  L, Total Bilirubin 1.2, AST 50, ALT 33, Alkaline Phosphatase 95, 
Total Protein 8.3  H, Albumin 4.1, Globulin 4.2, Albumin/Globulin Ratio 1.0  L, 
CBC w Diff MAN DIFF ORDERED, RBC 5.80, MCV 83.2, MCH 26.1  L, MCHC 31.3  L, RDW 
20.6  H, MPV 11.8  H, Gran % 79.5  H, Lymphocytes % 7.2  L, Monocytes % 12.7  H,
Eosinophils % 0.3, Basophils % 0.3, Absolute Granulocytes 4.9, Segmented 
Neutrophils 73, Band Neutrophils 9  H, Absolute Lymphocytes 0.4  L, Lymphocytes 
8  L, Monocytes 9, Absolute Monocytes 0.8  H, Absolute Eosinophils 0, Basophils 
1, Absolute Basophils 0, Platelet Estimate VERIFIED BY SMEAR, Polychromasia 1+, 
Anisocytosis 1+
 
 
Assessment/Plan
Assessment:
53 yo M with PMH of PVD, ESRD maintained on hemodialysis since April 2000, HTN, 
hyperparathyroidism, CVA, s/p left upper extremity finger amputation due to 
chronic osteomyelitis, recently discharged after undergoing angioplasty for left
foot for nonhealing left foot ulcer presented to ER after sent by home care to 
consist of not undergoing dialysis and unable to more of the bed. VS at 
admission  Temperature of 96, pulse 113, blood pressure 96/69 mmHg, on room air.
 Labs   did show white count of 6.2, H&H 15/48, platelet count 212.  Sodium 142,
potassium 4.8, chloride 96, bicarb 18, anion gap of 29, BUN 62/creatinine 13.8, 
GFR 4.  Calcium 8.
 
Admit to general medicine floor
 
Problem list
Left foot ulcer with wound VAC in situ
ESRD on dialysis
 
Left foot ulcer with wound VAC in situ
Appears stable with drainage on wound VAC, no signs of infection for now.  
Denies any episodes of fever after discharge.  Unable to walk/bear weight on his
foot.  Consulted Dr. Crocker for wound care.
 
ESRD on dialysis
His last dialysis was last witnessed her.  We did undergo dialysis on Friday.  
He did have anion gap with mild acidosis secondary to presumably his renal 
failure.  We will consult nephrology and he  benefits dialysis as first thing in
the morning.
 
Continue home medication Nexium for now
 
DVT prophylaxis
Subcutaneous heparin
 
CODE STATUS
Full code
 
As Ranked By This Provider
Problem List:
 1. Foot ulcer, left
 
 2. ESRD (end stage renal disease) on dialysis
 
 
Core Measures/Misc (9/17)
 
Acute Coronary Syndrome
ACS Diagnosis: No
 
Congestive Heart Failure
Congestive Heart Failure Diagnosis No
 
Cerebrovascular Accident
CVA/TIA Diagnosis: No
 
VTE (View Protocol)
VTE Risk Factors Acute Medical Illness
No Mechanical VTE Prophylaxis d/t N/A MechProphylax Ordered
No VTE Pharm Prophylaxis d/t NA PharmProphylax ordered
 
Sepsis (View protocol)
Sepsis Present: No
 
 
Arely BEDOLLA,Amir 04/02/18 0801:
Attending MD Review Statement
 
Attending Statement
Attending MD Statement: examined this patient, discuss w/resident/PA/NP, agreed 
w/resident/PA/NP, reviewed EMR data (avail), discussed with nursing
Attending Assessment/Plan:
Chart reviewed, case d/w the team.  Agree with the H&P, exam, and A/P as 
outlined by resident
f/u Podiatry eval
pt will need to be dialyzed as per the schedule

## 2018-04-01 NOTE — ADMISSION CERTIFICATION
Admission Certification
 
Certification Statement
- As attending physician, I certify that at the time of
- admission, based on clinical presentation, severity of
- symptoms, need for further diagnostic testing and
- therapeutic interventions, and risk of adverse outcomes
- without in-hospital treatment, in my clinical assessment,
- this patient requires an acute hospital stay for a minimum
- of two nights or longer. I have also considered psychsocial
- factors such as support system, advanced age, financial
- issues, cognitive issues, and failed out-patient treatments,
- past re-admission history, safety of patient, and lack of
- compliance as applicable.
Specific rationale supporting this admission is:
Foot ulcer, ESRD on HD

## 2018-04-01 NOTE — ED GENERAL ADULT
**See Addendum**
History of Present Illness
 
General
Chief Complaint: General Adult
Stated Complaint: BIBA FOR WOUND VAC ISSUES
Source: patient
Exam Limitations: no limitations
 
Vital Signs & Intake/Output
Vital Signs & Intake/Output
 Vital Signs
 
 
Date Time Temp Pulse Resp B/P B/P Pulse O2 O2 Flow FiO2
 
     Mean Ox Delivery Rate 
 
04/01 1258      97   
 
04/01 1257 96.0 113 18 96/69  94 Room Air  
 
 
 
Allergies
Coded Allergies:
Iodinated Contrast- Oral and IV Dye (IODINATED CONTRAST MEDIA - IV DYE) (RASH 03
/19/18)
 
Reconcile Medications
Aspirin (Ecotrin*) 81 MG TABLET.   81 MG PO DAILY Heart
Atorvastatin Calcium 40 MG TABLET   40 MG PO 1700 Cholesterol
Omeprazole 20 MG CAPSULE.   1 CAP PO DAILY GI  (Reported)
 
Triage Note:
PT LEFT HOSPITAL ON FRIDAY WAS HOME AND WAS BEING
SEEN BY VISITING NURSE.  PT STATES HE DOESN'T LIKE
THIS NURSE AND REFUSED TREATMENT.  NURSE TOLD EMS
THAT PT HAS BEEN LYING ON THE COUCH SINCE HE WAS
DISCHARGED A WEEK AGO AND HAS NOT HAD HIS DIALYSIS
PT IS SCHEDULED FOR DIALYSIS MON. WED AND FRIDAYS.
DR. NOBLE IN ROOM FOR EVAL.  PT STATES HE HAS BEEN
UNABLE TO AMBULATE ON HIS LEFT FOOT.
Triage Nurses Notes Reviewed? yes
Onset: Abrupt
Duration: day(s):
Timing: recent history
HPI:
 
 
 
 
4/1/18
52-year-old man presents to the emergency department complaining of ongoing left
foot pain and inability to get to dialysis.
The patient states he status post surgery for infected ulceration by Dr. Jones to the left foot.  He was discharged home on Thursday but has been 
unable to manage due to the pain, he has a wound VAC in his left foot.  He 
denies any fever or other complaints, no chest pain or shortness of breath.  He 
has a past medical history of renal failure.
 
Past History
 
Travel History
Traveled to [place] past 21 day No
 
Medical History
Any Pertinent Medical History? see below for history
Neurological: CVA, migraine, seizure
EENT: NONE
Cardiovascular: hypertension
Respiratory: collapsed left lung with a persistent left pleural effusion.
Gastrointestinal: GERD, GI BLEED
Hepatic: NONE
Renal: ESRD on HD, DIALYSIS M-W-F ACCESS L LEG
Musculoskeletal: gout, osteoarthritis, secondary OSTEOPOROSIS GANGREEN L 2ND 
FINGER
Psychiatric: NONE
Endocrine: hyperparathyroidism
Blood Disorders: NONE
Cancer(s): NONE
GYN/Reproductive: NONE
Other Medical Hx:
In 2004 he had an infected abscess with MSSA bacteremia.
 Complete central venous occlusion in 2007.
History of MRSA: No
History of VRE: No
History of CDIFF: No
Influenza Vaccine: 09/01/17
 
Surgical History
Surgical History: GRAFT, LEFT THIGH s/p placement of numerous August catheters/also
dual-lumen Amor's left radial cephalic primary AVF on 03/28/2000 left upper 
arm AVG Tenckhoff catheter placement and subsequent removal status post 
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
 
Psychosocial History
Who do you live with Family
Services at Home Nursing
What is your primary language English
Tobacco Use: Never used
ETOH Use: denies use
Illicit Drug Use: denies illicit drug use
 
Family History
Family History, If Any:
MOTHER (Heart Dz, Breast Ca).
FATHER (Diabetes).
Relation not specified for:
  FH: stroke
 
Hx Contributory? No
 
Review of Systems
 
Review of Systems
Constitutional:
Denies: fever. 
EENTM:
Denies: visual changes. 
Respiratory:
Denies: short of breath. 
Cardiovascular:
Denies: chest pain. 
GI:
Denies: abdominal pain. 
Genitourinary:
Reports: see HPI. 
Musculoskeletal:
Reports: see HPI. 
Skin:
Reports: see HPI. 
Neurological/Psychological:
Reports: no symptoms. 
Hematologic/Endocrine:
Reports: no symptoms. 
Immunologic/Allergic:
Reports: no symptoms. 
 
Physical Exam
 
Physical Exam
General Appearance: alert, awake, anxious, mild distress
Head: atraumatic, normal appearance
Eyes:
Bilateral: normal appearance, PERRL, EOMI. 
Ears, Nose, Throat: normal pharynx, normal ENT inspection
Neck: normal inspection, supple, full range of motion
Respiratory: normal breath sounds, chest non-tender, no respiratory distress
Cardiovascular: regular rate/rhythm
Peripheral Pulses:
3+ dorsalis pedis (L)
Gastrointestinal: soft, non-tender
Back: decreased range of motion
Extremities: pedal edema
Neurologic/Psych: no motor/sensory deficits, awake, alert, oriented x 3
Skin: intact, normal color
 
Core Measures
ACS in differential dx? No
CVA/TIA Diagnosis: No
Sepsis Present: No
Sepsis Focused Exam Completed? No
 
Progress
Differential Diagnoses
I considered the following diagnoses in my evaluation of the patient: [
Osteomyelitis, infected ulcer, intractable pain]
 
Plan of Care:
 Orders
 
 
Procedure Date/time Status
 
Renal Dialysis Diet 04/01 D Active
 
Admit to inpatient 04/01 1500 Active
 
Vital Signs 04/01 1500 Active
 
Code Status 04/01 1500 Active
 
Patient Data 04/01 1456 Active
 
COMPREHENSIVE METABOLIC PANEL 04/01 1321 Complete
 
CBC WITHOUT DIFFERENTIAL 04/01 1321 Complete
 
EKG 04/01 1321 Active
 
 
 Laboratory Tests
 
 
 
04/01/18 1407:
Anion Gap 29  H, Estimated GFR 4  L, BUN/Creatinine Ratio 4.5  L, Glucose 83, 
Calcium 8.0  L, Total Bilirubin 1.2, AST 50, ALT 33, Alkaline Phosphatase 95, 
Total Protein 8.3  H, Albumin 4.1, Globulin 4.2, Albumin/Globulin Ratio 1.0  L, 
CBC w Diff MAN DIFF ORDERED, RBC 5.80, MCV 83.2, MCH 26.1  L, MCHC 31.3  L, RDW 
20.6  H, MPV 11.8  H, Gran % 79.5  H, Lymphocytes % 7.2  L, Monocytes % 12.7  H,
Eosinophils % 0.3, Basophils % 0.3, Absolute Granulocytes 4.9, Segmented 
Neutrophils 73, Band Neutrophils 9  H, Absolute Lymphocytes 0.4  L, Lymphocytes 
8  L, Monocytes 9, Absolute Monocytes 0.8  H, Absolute Eosinophils 0, Basophils 
1, Absolute Basophils 0, Platelet Estimate VERIFIED BY SMEAR, Polychromasia 1+, 
Anisocytosis 1+
 
Initial ED EKG: PENDING
 
Departure
 
Departure
Disposition: STILL A PATIENT
Condition: Stable
Clinical Impression
Primary Impression: Intractable pain
Secondary Impressions: Renal failure
Referrals:
Patient Has No Primary Care Dr (PCP/Family)
 
Departure Forms:
Customer Survey
General Discharge Information
 
Admission Note
Spoke With:
Arely BEDOLLA,Amir
Documentation of Exam:
Documentation of any treatments & extenuating circumstances including Concerns 
Regarding Discharge (functional status, medication knowledge or non-compliance, 
living conditions, etc.) that warrant an admission rather than observation: [
Patient needs admission for physical therapy consultation, dialysis, pain 
control, and likely placement in short-term rehabilitation facility, podiatry 
consult-I spoke with Dr. Crocker he will see the patient nephrology was paged,]
 
 
Critical Care Note
 
Critical Care Note
Critical Care Time: non-applicable

## 2018-04-02 VITALS — DIASTOLIC BLOOD PRESSURE: 64 MMHG | SYSTOLIC BLOOD PRESSURE: 100 MMHG

## 2018-04-02 VITALS — SYSTOLIC BLOOD PRESSURE: 101 MMHG | DIASTOLIC BLOOD PRESSURE: 72 MMHG

## 2018-04-02 VITALS — SYSTOLIC BLOOD PRESSURE: 110 MMHG | DIASTOLIC BLOOD PRESSURE: 60 MMHG

## 2018-04-02 LAB
ABSOLUTE GRANULOCYTE CT: 4.6 /CUMM (ref 1.4–6.5)
BASOPHILS # BLD: 0 /CUMM (ref 0–0.2)
BASOPHILS NFR BLD: 0.2 % (ref 0–2)
EOSINOPHIL # BLD: 0.1 /CUMM (ref 0–0.7)
EOSINOPHIL NFR BLD: 0.9 % (ref 0–5)
ERYTHROCYTE [DISTWIDTH] IN BLOOD BY AUTOMATED COUNT: 21 % (ref 11.5–14.5)
GRANULOCYTES NFR BLD: 64.5 % (ref 42.2–75.2)
HCT VFR BLD CALC: 40.7 % (ref 42–52)
LYMPHOCYTES # BLD: 1.1 /CUMM (ref 1.2–3.4)
MCH RBC QN AUTO: 26.3 PG (ref 27–31)
MCHC RBC AUTO-ENTMCNC: 31.9 G/DL (ref 33–37)
MCV RBC AUTO: 82.3 FL (ref 80–94)
MONOCYTES # BLD: 1.4 /CUMM (ref 0.1–0.6)
PLATELET # BLD: 207 /CUMM (ref 130–400)
PMV BLD AUTO: 11.3 FL (ref 7.4–10.4)
RED BLOOD CELL CT: 4.94 /CUMM (ref 4.7–6.1)
WBC # BLD AUTO: 7.2 /CUMM (ref 4.8–10.8)

## 2018-04-02 PROCEDURE — 5A1D70Z PERFORMANCE OF URINARY FILTRATION, INTERMITTENT, LESS THAN 6 HOURS PER DAY: ICD-10-PCS

## 2018-04-02 NOTE — PN- HOUSESTAFF
**See Addendum**
Subjective
Follow-up For:
Left foot ulcer with wound vac, ESRD
Subjective:
No overnight events. Patient was admitted yesterday. Apparently, after discharge
, he was not able to get off his couch because the pain in his foot and he could
not walk. He was supposed to follow-up for dialysis last Friday as well as 
various other appointments that he was not able to make. A visiting nurse came 
yesterday and decided to call an ambulance because of his deteriorating 
condition. He does not report any symptoms at this time including no chest pain,
shortness of breath, abdominal pain, fevers, night sweats, chills. He does have 
some pain in his left foot. He does not make urine.
 
Review of Systems
Constitutional:
Reports: no symptoms. 
EENTM:
Reports: no symptoms. 
Cardiovascular:
Reports: no symptoms. 
Respiratory:
Reports: no symptoms. 
Gastrointestinal:
Reports: no symptoms. 
Genitourinary:
Reports: see HPI. 
Musculoskeletal:
Reports: see HPI. 
Skin:
Reports: no symptoms. 
Neurological/Psychological:
Reports: no symptoms. 
Hematologic/Endocrine:
Reports: no symptoms. 
Immunologic/Allergic:
Reports: no symptoms. 
 
Objective
Last 24 Hrs of Vital Signs/I&O
 Vital Signs
 
 
Date Time Temp Pulse Resp B/P B/P Pulse O2 O2 Flow FiO2
 
     Mean Ox Delivery Rate 
 
 0620 97.7 89 18 100/64  95 Room Air  
 
 2133 97.5 94 20 110/60  97 Room Air  
 
 1933 97.7 90 20 102/58  97 Room Air  
 
 1733  91 20 102/67  96 Room Air  
 
 1258      97   
 
 1257 96.0 113 18 96/69  94 Room Air  
 
 
 Intake & Output
 
 
  0800  0000  1600
 
Intake Total  600 
 
Output Total   
 
Balance  600 
 
    
 
Intake, Oral  600 
 
Patient 80.881 kg 83.915 kg 77.111 kg
 
Weight   
 
Weight Bed scale  Estimated
 
Measurement   
 
Method   
 
 
 
 
Physical Exam
General Appearance: Alert, Oriented X3, Cooperative, No Acute Distress
Cardiovascular: Regular Rate, Normal S1, Normal S2
Lungs: Clear to Auscultation, Normal Air Movement
Abdomen: Normal Bowel Sounds, Soft, No Tenderness
Extremities: Left foot wrapped
Current Medications:
 Current Medications
 
 
  Sig/Jabari Start time  Last
 
Medication Dose Route Stop Time Status Admin
 
Acetaminophen 1,000 MG Q8P PRN  0715 AC 
 
  PO   
 
Acetaminophen 650 MG Q6P PRN 2115 DC 
 
  PO   
 
Aspirin Buffered 81 MG DAILY 9 AC 
 
  PO   2128
 
Atorvastatin Calcium 40 MG 1700  1700 AC 
 
  PO   
 
Heparin Sodium  5,000 UNIT Q8 2200 AC 
 
(Porcine)  SC   
 
Hydrocodone Bitart/ 1 TAB Q6P PRN 2115 DC 
 
Acetaminophen  PO   
 
Omeprazole 20 MG DAILY  1000 AC 
 
  PO   
 
Oxycodone HCl 5 MG Q6 PRN  0715 AC 
 
  PO   
 
 
 
 
Last 24 Hrs of Lab/Arnold Results
Last 24 Hrs of Labs/Mics:
 Laboratory Tests
 
18 1519:
Urine Color Cancelled, Urine Clarity Cancelled, Urine pH Cancelled, Ur Specific 
Gravity Cancelled, Urine Protein Cancelled, Urine Ketones Cancelled, Urine 
Nitrite Cancelled, Urine Bilirubin Cancelled, Urine Urobilinogen Cancelled, Ur 
Leukocyte Esterase Cancelled, Ur Microscopic Cancelled, Urine Hemoglobin 
Cancelled, Urine Glucose Cancelled
 
18 1407:
Anion Gap 29  H, Estimated GFR 4  L, BUN/Creatinine Ratio 4.5  L, Glucose 83, 
Calcium 8.0  L, Total Bilirubin 1.2, AST 50, ALT 33, Alkaline Phosphatase 95, 
Total Protein 8.3  H, Albumin 4.1, Globulin 4.2, Albumin/Globulin Ratio 1.0  L, 
CBC w Diff MAN DIFF ORDERED, RBC 5.80, MCV 83.2, MCH 26.1  L, MCHC 31.3  L, RDW 
20.6  H, MPV 11.8  H, Gran % 79.5  H, Lymphocytes % 7.2  L, Monocytes % 12.7  H,
Eosinophils % 0.3, Basophils % 0.3, Absolute Granulocytes 4.9, Segmented 
Neutrophils 73, Band Neutrophils 9  H, Absolute Lymphocytes 0.4  L, Lymphocytes 
8  L, Monocytes 9, Absolute Monocytes 0.8  H, Absolute Eosinophils 0, Basophils 
1, Absolute Basophils 0, Platelet Estimate VERIFIED BY SMEAR, Polychromasia 1+, 
Anisocytosis 1+
 
 
Assessment/Plan
Assessment:
53 yo M with PMH of PVD, ESRD maintained on hemodialysis since 2000, HTN, 
hyperparathyroidism, CVA, s/p left upper extremity finger amputation due to 
chronic osteomyelitis, recently discharged after undergoing angioplasty for left
foot for nonhealing left foot ulcer who was admitted yesterday because he could 
not make his dialysis appointment. 
 
Problem list:
1. End-stage renal disease on dialysis
2. Left foot ulcer with wound VAC
 
#End-stage renal disease on dialysis: The patient receives his normal dialysis 
care at [facility] by Dr. Wolff. He receives a Monday/Wednesday
/Friday.
-Nephrology consult
-Hemodialysis Monday/Wednesday/Friday
 
#Left foot ulcer with wound VAC: Last time patient was here recently, he was 
offered MRI to evaluate for osteomyelitis but refused. He also refused 
rehabilitation facility placement. He is now coming in because he could not 
tolerate the pain and could not walk secondary to his medical conditions.
-Continue local wound care with wound VAC
-Podiatry consult
-PT consult
-Acetaminophen plus oxycodone for pain
 
#Chronic medical problems:
-Continue aspirin and atorvastatin
 
DVT prophylaxis with heparin
Renal dialysis diet
Full code
Problem List:
 1. ESRD (end stage renal disease) on dialysis
 
 2. Foot ulcer, left
 
Pain Ratin
Pain Location:
foot
Pain Goal: Remain pain free
Pain Plan:
see a/p
Tomorrow's Labs & Rationales:
bep

## 2018-04-02 NOTE — PN- NEPHROLOGY
Assessment/Plan Nephrology
Assessment:
 
1.  ESRD
 
2.  Peripheral vascular disease with ulcer left heel, with wound VAC, status 
post angioplasty of left anterior tibial, posterior tibial, peroneal, and 
dorsalis pedis arteries
 
 
Suggestion:
 
1.  Hemodialysis today in progress with UF to EDW as tolerated over 3.5hrs
 
2.  Continue outpt meds and diet
 
3.  Mobilize; physical therapy
 
4.  Next HD for Wed 4/4
 
 
 
 
Subjective
Subjective:
 
Patient returns to the hospital soon after discharge last week because of 
inability to walk due to his left foot ulcer for which he continues to have a 
wound VAC.  He is seen with dialysis today and offers no other specific 
complaints.
 
Objective
Vital Signs and I&Os
Vital Signs
 
 
Date Time Temp Pulse Resp B/P B/P Pulse O2 O2 Flow FiO2
 
     Mean Ox Delivery Rate 
 
04/02 0620 97.7 89 18 100/64  95 Room Air  
 
04/02 0000      97 Room Air  
 
04/01 2133 97.5 94 20 110/60  97 Room Air  
 
04/01 1933 97.7 90 20 102/58  97 Room Air  
 
04/01 1733  91 20 102/67  96 Room Air  
 
04/01 1258      97   
 
04/01 1257 96.0 113 18 96/69  94 Room Air  
 
 
 Intake & Output
 
 
 04/02 1600 04/02 0400 04/01 1600 04/01 0400 03/31 1600 03/31 0400
 
Intake Total 0 600    
 
Output Total 0     
 
Balance 0 600    
 
       
 
Intake, IV 0     
 
Intake, Oral 0 600    
 
Number 0     
 
Bowel      
 
Movements      
 
Output, Urine 0     
 
Patient 178 lb 185 lb 170 lb   
 
Weight      
 
Weight Bed scale  Estimated   
 
Measurement      
 
Method      
 
 
 
Physical Exam:
 
General: Well-developed white male in NAD
Skin: No rash or jaundice
HEENT: Conjunctivae pink, sclerae anicteric, mucous membranes moist
Neck: Without masses or thyromegaly, no supraclavicular or cervical adenopathy
Chest: Clear to P&A
Heart: Regular rate and rhythm without S3 or rub
Abdomen: Soft and nontender without palpable masses or organomegaly
Extremities: Without cyanosis or edema; left foot dressing intact with wound VAC
in place
Neuro: No lateralizing findings, no asterixis or myoclonus
 
 
 
Results
Pertinent Lab Results:
 Laboratory Tests
 
 
 04/02 04/01 04/01
 
 0920 1519 1407
 
Chemistry   
 
  Sodium (137 - 145 mmol/L) Pending  142
 
  Potassium (3.5 - 5.1 mmol/L) Pending  4.8
 
  Chloride (98 - 107 mmol/L) Pending  96  L
 
  Carbon Dioxide (22 - 30 mmol/L) Pending  18  L
 
  Anion Gap (5 - 16) Pending  29  H
 
  BUN (9 - 20 mg/dL) Pending  62  H
 
  Creatinine (0.7 - 1.2 mg/dL) Pending  13.8 *H
 
  Estimated GFR (>60 ml/min)   4  L
 
  BUN/Creatinine Ratio (7 - 25 %) Pending  4.5  L
 
  Glucose (65 - 99 mg/dL)   83
 
  Calcium (8.4 - 10.2 mg/dL)   8.0  L
 
  Total Bilirubin (0.2 - 1.3 mg/dL)   1.2
 
  AST (17 - 59 U/L)   50
 
  ALT (21 - 72 U/L)   33
 
  Alkaline Phosphatase (< 127 U/L)   95
 
  Total Protein (6.3 - 8.2 g/dL)   8.3  H
 
  Albumin (3.5 - 5.0 g/dL)   4.1
 
  Globulin (1.9 - 4.2 gm/dL)   4.2
 
  Albumin/Globulin Ratio (1.1 - 2.2 %)   1.0  L
 
Hematology   
 
  CBC w Diff Pending  MAN DIFF ORDERED
 
  WBC (4.8 - 10.8 /CUMM) Pending  6.2
 
  RBC (4.70 - 6.10 /CUMM) Pending  5.80
 
  Hgb (14.0 - 18.0 G/DL) Pending  15.1
 
  Hct (42 - 52 %) Pending  48.3
 
  MCV (80.0 - 94.0 FL) Pending  83.2
 
  MCH (27.0 - 31.0 PG) Pending  26.1  L
 
  MCHC (33.0 - 37.0 G/DL) Pending  31.3  L
 
  RDW (11.5 - 14.5 %) Pending  20.6  H
 
  Plt Count (130 - 400 /CUMM) Pending  212
 
  MPV (7.4 - 10.4 FL) Pending  11.8  H
 
  Gran % (42.2 - 75.2 %) Pending  79.5  H
 
  Lymphocytes % (20.5 - 51.1 %) Pending  7.2  L
 
  Monocytes % (1.7 - 9.3 %) Pending  12.7  H
 
  Eosinophils % (0 - 5 %) Pending  0.3
 
  Basophils % (0.0 - 2.0 %) Pending  0.3
 
  Absolute Granulocytes (1.4 - 6.5 /CUMM) Pending  4.9
 
  Segmented Neutrophils (42.2 - 75.2 %)   73
 
  Band Neutrophils (0.0 - 5.0 %)   9  H
 
  Absolute Lymphocytes (1.2 - 3.4 /CUMM) Pending  0.4  L
 
  Lymphocytes (20.5 - 51.1 %)   8  L
 
  Monocytes (1.7 - 9.3 %)   9
 
  Absolute Monocytes (0.10 - 0.60 /CUMM) Pending  0.8  H
 
  Absolute Eosinophils (0.0 - 0.7 /CUMM) Pending  0
 
  Basophils (0.0 - 2.0 %)   1
 
  Absolute Basophils (0.0 - 0.2 /CUMM) Pending  0
 
  Platelet Estimate (ADEQUATE)   VERIFIED BY SMEAR
 
  Polychromasia   1+
 
  Anisocytosis   1+
 
Urines   
 
  Urine Color  Cancelled 
 
  Urine Clarity  Cancelled 
 
  Urine pH  Cancelled 
 
  Ur Specific Gravity  Cancelled 
 
  Urine Protein  Cancelled 
 
  Urine Ketones  Cancelled 
 
  Urine Nitrite  Cancelled 
 
  Urine Bilirubin  Cancelled 
 
  Urine Urobilinogen  Cancelled 
 
  Ur Leukocyte Esterase  Cancelled 
 
  Ur Microscopic  Cancelled 
 
  Urine Hemoglobin  Cancelled 
 
  Urine Glucose  Cancelled

## 2018-04-02 NOTE — PATIENT DISCHARGE INSTRUCTIONS
Discharge Instructions
 
General Discharge Information
You were seen/treated for:
End-stage renal disease, cellulitis
Watch for these problems:
Fever, chest pain, shortness of breath
Special Instructions:
-Please take all medications as directed. 
-Please follow-up with Dr. Wolff for your dialysis Monday/Wednesday/Friday. 
-Please follow-up with podiatry.
-please use NS for cleaning, Use Aquacel  and DPD for dressing daily
-100 % offloadfing
 
Diet
Continue normal diet: No
Recommended Diet: Renal Dialysis
 
Activity
Full Activity/No Limits: No
Other activity limits:
100 % offloading
 
Acute Coronary Syndrome
 
Inclusion Criteria
At DC or during hospital stay patient has or had the following:
ACS DIAGNOSIS No
 
Discharge Core Measures
Meds if any: Prescribed or Continued at Discharge
Meds if any: NOT Prescribed or Continued at Discharge
 
Congestive Heart Failure
 
Inclusion Criteria
At DC or during hospital stay patient has or had the following:
CHF DIAGNOSIS No
 
Discharge Core Measures
Meds if any: Prescribed or Continued at Discharge
Meds if any: NOT Prescribed or Continued at Discharge
 
Cerebrovascular accident
 
Inclusion Criteria
At DC or during hospital stay patient has or had the following:
CVA/TIA Diagnosis No
 
Discharge Core Measures
Meds if any: Prescribed or Continued at Discharge
Meds if any: NOT Prescribed or Continued at Discharge
 
Venous thromboembolism
 
Inclusion Criteria
VTE Diagnosis No
VTE Type NONE
VTE Confirmed by (Test) NONE
 
Discharge Core Measures
- Per Current guidelines, there needs to be overlap
- treatment for the first 5 days of Warfarin therapy.
- If discharged on Warfarin prior to 5 days of
- overlap therapy, the patient will need to be
- assessed for post discharge needs including
- *Post discharge parental anticoagulation
- *Warfarin and/or parental anticoagulation education
- *Follow up date to check INR post discharge
At least 5 days overlap therapy as Inpatient No
Meds if any: Prescribed or Continued at Discharge
Note: Overlap Therapy is Warfarin and Anticoagulant
Meds if any: NOT Prescribed or Continued at Discharge

## 2018-04-03 VITALS — DIASTOLIC BLOOD PRESSURE: 80 MMHG | SYSTOLIC BLOOD PRESSURE: 110 MMHG

## 2018-04-03 VITALS — DIASTOLIC BLOOD PRESSURE: 70 MMHG | SYSTOLIC BLOOD PRESSURE: 118 MMHG

## 2018-04-03 VITALS — DIASTOLIC BLOOD PRESSURE: 85 MMHG | SYSTOLIC BLOOD PRESSURE: 111 MMHG

## 2018-04-03 NOTE — PN- NEPHROLOGY
Assessment/Plan Nephrology
Assessment:
 
1.  ESRD
 
2.  Peripheral vascular disease with ulcer left heel, with wound VAC, status 
post angioplasty of left anterior tibial, posterior tibial, peroneal, and 
dorsalis pedis arteries
 
 
Suggestion:
 
1.  Check serum phosphorus and begin sevelamer 800 mg by mouth 3 times a day 
with meals
 
2.  Nephro-Natividad 1 by mouth daily
 
3.  Physical therapy/rehabilitation
 
4.  Hemodialysis scheduled here for tomorrow
 
 
 
 
Subjective
Subjective:
 
Patient has no complaints of the fact that he still can't walk or weight bear.  
Blood pressure okay and he remains afebrile with normal WBC.  Serum phosphorus 
levels during his last admission were significantly elevated and he is not on a 
binder, nor is he on a multivitamin.
 
 
 
Objective
Vital Signs and I&Os
Vital Signs
 
 
Date Time Temp Pulse Resp B/P B/P Pulse O2 O2 Flow FiO2
 
     Mean Ox Delivery Rate 
 
04/03 0840       Room Air  
 
04/03 0645 98.1 88 20 111/85  96 Room Air  
 
04/02 2208  52 20 110/60  96 Room Air  
 
04/02 1453 97.6 110 20 101/72  97   
 
 
 Intake & Output
 
 
 04/03 1600 04/03 0400 04/02 1600 04/02 0400 04/01 1600 04/01 0400
 
Intake Total  50 1400 600  
 
Output Total 0  0   
 
Balance 0 50 1400 600  
 
       
 
Intake,   1000   
 
Dialysate      
 
Intake, IV   0   
 
Intake, Oral  50 400 600  
 
Number 1  0   
 
Bowel      
 
Movements      
 
Output, Urine 0  0   
 
Patient 168 lb  178 lb 185 lb 170 lb 
 
Weight      
 
Weight Bed scale  Bed scale  Estimated 
 
Measurement      
 
Method      
 
 
 
Physical Exam:
 
General: Well-developed white male in NAD
Skin: No rash or jaundice
HEENT: Conjunctivae pink, sclerae anicteric, mucous membranes moist
Neck: Without masses or thyromegaly, no supraclavicular or cervical adenopathy
Chest: Clear to P&A
Heart: Regular rate and rhythm without S3 or rub
Abdomen: Soft and nontender without palpable masses or organomegaly
Extremities: Without cyanosis or edema; left foot dressing intact with wound VAC
in place
Neuro: No lateralizing findings, no asterixis or myoclonus
 
 
Current Medications:
 Current Medications
 
 
  Sig/Jabari Start time  Last
 
Medication Dose Route Stop Time Status Admin
 
Acetaminophen 1,000 MG Q8P PRN 04/02 0715 AC 
 
  PO   
 
Aspirin Buffered 81 MG DAILY 04/01 1709 AC 04/03
 
  PO   0809
 
Atorvastatin Calcium 40 MG 1700 04/02 1700 AC 
 
  PO   
 
Bisacodyl 10 MG ONCE PRN 04/03 0945 AC 
 
  UT   
 
Heparin Sodium  5,000 UNIT Q8 04/01 2200 AC 04/03
 
(Porcine)  SC   1319
 
Omeprazole 20 MG DAILY 04/02 1000 AC 04/03
 
  PO   0809
 
Oxycodone HCl 5 MG Q6 PRN 04/02 0715 AC 04/03
 
  PO   1324
 
Polyethylene Glycol 17 GM DAILY 04/03 1000 AC 
 
  PO   
 
Senna/Docusate Sodium 2 TAB DAILY PRN 04/03 0945 AC 
 
  PO   
 
 
 
 
Results
Pertinent Lab Results:
 Laboratory Tests
 
 
 04/03 04/02 04/01
 
 0755 0920 1519
 
Chemistry   
 
  Sodium (137 - 145 mmol/L) 140 140 
 
  Potassium (3.5 - 5.1 mmol/L) 3.8 4.8 
 
  Chloride (98 - 107 mmol/L) 99 95  L 
 
  Carbon Dioxide (22 - 30 mmol/L) 20  L 19  L 
 
  Anion Gap (5 - 16) 20  H 25  H 
 
  BUN (9 - 20 mg/dL) 31  H 69  H 
 
  Creatinine (0.7 - 1.2 mg/dL) 8.7 *H 15.2 *H 
 
  Estimated GFR (>60 ml/min) 7  L 3  L 
 
  BUN/Creatinine Ratio (7 - 25 %) 3.6  L 4.5  L 
 
Hematology   
 
  CBC w Diff  NO MAN DIFF REQ 
 
  WBC (4.8 - 10.8 /CUMM)  7.2 
 
  RBC (4.70 - 6.10 /CUMM)  4.94 
 
  Hgb (14.0 - 18.0 G/DL)  13.0  L 
 
  Hct (42 - 52 %)  40.7  L 
 
  MCV (80.0 - 94.0 FL)  82.3 
 
  MCH (27.0 - 31.0 PG)  26.3  L 
 
  MCHC (33.0 - 37.0 G/DL)  31.9  L 
 
  RDW (11.5 - 14.5 %)  21.0  H 
 
  Plt Count (130 - 400 /CUMM)  207 
 
  MPV (7.4 - 10.4 FL)  11.3  H 
 
  Gran % (42.2 - 75.2 %)  64.5 
 
  Lymphocytes % (20.5 - 51.1 %)  14.8  L 
 
  Monocytes % (1.7 - 9.3 %)  19.6  H 
 
  Eosinophils % (0 - 5 %)  0.9 
 
  Basophils % (0.0 - 2.0 %)  0.2 
 
  Absolute Granulocytes (1.4 - 6.5 /CUMM)  4.6 
 
  Absolute Lymphocytes (1.2 - 3.4 /CUMM)  1.1  L 
 
  Absolute Monocytes (0.10 - 0.60 /CUMM)  1.4  H 
 
  Absolute Eosinophils (0.0 - 0.7 /CUMM)  0.1 
 
  Absolute Basophils (0.0 - 0.2 /CUMM)  0 
 
Urines   
 
  Urine Color   Cancelled
 
  Urine Clarity   Cancelled
 
  Urine pH   Cancelled
 
  Ur Specific Gravity   Cancelled
 
  Urine Protein   Cancelled
 
  Urine Ketones   Cancelled
 
  Urine Nitrite   Cancelled
 
  Urine Bilirubin   Cancelled
 
  Urine Urobilinogen   Cancelled
 
  Ur Leukocyte Esterase   Cancelled
 
  Ur Microscopic   Cancelled
 
  Urine Hemoglobin   Cancelled
 
  Urine Glucose   Cancelled
 
 
 
 
 04/01
 
 1407
 
Chemistry 
 
  Sodium (137 - 145 mmol/L) 142
 
  Potassium (3.5 - 5.1 mmol/L) 4.8
 
  Chloride (98 - 107 mmol/L) 96  L
 
  Carbon Dioxide (22 - 30 mmol/L) 18  L
 
  Anion Gap (5 - 16) 29  H
 
  BUN (9 - 20 mg/dL) 62  H
 
  Creatinine (0.7 - 1.2 mg/dL) 13.8 *H
 
  Estimated GFR (>60 ml/min) 4  L
 
  BUN/Creatinine Ratio (7 - 25 %) 4.5  L
 
  Glucose (65 - 99 mg/dL) 83
 
  Calcium (8.4 - 10.2 mg/dL) 8.0  L
 
  Total Bilirubin (0.2 - 1.3 mg/dL) 1.2
 
  AST (17 - 59 U/L) 50
 
  ALT (21 - 72 U/L) 33
 
  Alkaline Phosphatase (< 127 U/L) 95
 
  Total Protein (6.3 - 8.2 g/dL) 8.3  H
 
  Albumin (3.5 - 5.0 g/dL) 4.1
 
  Globulin (1.9 - 4.2 gm/dL) 4.2
 
  Albumin/Globulin Ratio (1.1 - 2.2 %) 1.0  L
 
Hematology 
 
  CBC w Diff MAN DIFF ORDERED
 
  WBC (4.8 - 10.8 /CUMM) 6.2
 
  RBC (4.70 - 6.10 /CUMM) 5.80
 
  Hgb (14.0 - 18.0 G/DL) 15.1
 
  Hct (42 - 52 %) 48.3
 
  MCV (80.0 - 94.0 FL) 83.2
 
  MCH (27.0 - 31.0 PG) 26.1  L
 
  MCHC (33.0 - 37.0 G/DL) 31.3  L
 
  RDW (11.5 - 14.5 %) 20.6  H
 
  Plt Count (130 - 400 /CUMM) 212
 
  MPV (7.4 - 10.4 FL) 11.8  H
 
  Gran % (42.2 - 75.2 %) 79.5  H
 
  Lymphocytes % (20.5 - 51.1 %) 7.2  L
 
  Monocytes % (1.7 - 9.3 %) 12.7  H
 
  Eosinophils % (0 - 5 %) 0.3
 
  Basophils % (0.0 - 2.0 %) 0.3
 
  Absolute Granulocytes (1.4 - 6.5 /CUMM) 4.9
 
  Segmented Neutrophils (42.2 - 75.2 %) 73
 
  Band Neutrophils (0.0 - 5.0 %) 9  H
 
  Absolute Lymphocytes (1.2 - 3.4 /CUMM) 0.4  L
 
  Lymphocytes (20.5 - 51.1 %) 8  L
 
  Monocytes (1.7 - 9.3 %) 9
 
  Absolute Monocytes (0.10 - 0.60 /CUMM) 0.8  H
 
  Absolute Eosinophils (0.0 - 0.7 /CUMM) 0
 
  Basophils (0.0 - 2.0 %) 1
 
  Absolute Basophils (0.0 - 0.2 /CUMM) 0
 
  Platelet Estimate (ADEQUATE) VERIFIED BY SMEAR
 
  Polychromasia 1+
 
  Anisocytosis 1+

## 2018-04-03 NOTE — PN- HOUSESTAFF
Awadalla MD,Neeta 18 0714:
Subjective
Follow-up For:
Left foot ulcer with wound vac
ESRD
Subjective:
No overnight events except that the patient's last night complaint of pain in 
his left foot caused by the wound VAC.  Patient reports having pain only when he
tries to walk.  He denies any fever, nausea, vomiting, diarrhea or constipation
 
Review of Systems
Constitutional:
Denies: no symptoms. 
Cardiovascular:
Denies: no symptoms. 
Respiratory:
Denies: no symptoms. 
Gastrointestinal:
Denies: no symptoms. 
Skin:
Reports: lesions. 
 
Objective
Last 24 Hrs of Vital Signs/I&O
 Vital Signs
 
 
Date Time Temp Pulse Resp B/P B/P Pulse O2 O2 Flow FiO2
 
     Mean Ox Delivery Rate 
 
 0645 98.1 88 20 111/85  96 Room Air  
 
 2208  52 20 110/60  96 Room Air  
 
 1453 97.6 110 20 101/72  97   
 
 
 Intake & Output
 
 
  1600  0800  0000
 
Intake Total   50
 
Output Total  0 
 
Balance  0 50
 
    
 
Intake, Oral   50
 
Number  1 
 
Bowel   
 
Movements   
 
Output, Urine  0 
 
Patient  168 lb 
 
Weight   
 
Weight  Bed scale 
 
Measurement   
 
Method   
 
 
 
 
Physical Exam
General Appearance: Alert, Oriented X3, Cooperative, No Acute Distress
Neck: Supple, No JVD
Cardiovascular: Normal S1, Normal S2, No Murmurs
Lungs: Clear to Auscultation
Abdomen: Normal Bowel Sounds, Soft
Extremities: No Clubbing, No Cyanosis, feet wrapped in clean surgical dressing, 
wound vac is in place, minimal discharge 
Vascular: Normal Pulses
 
Assessment/Plan
Assessment:
51 yo M with PMH of PVD, ESRD maintained on hemodialysis since 2000, HTN, 
hyperparathyroidism, CVA, s/p left upper extremity finger amputation due to 
chronic osteomyelitis, recently discharged after undergoing angioplasty for left
foot for nonhealing left foot ulcer who was admitted yesterday because he could 
not make his dialysis appointment. 
 
Problem list:
1. End-stage renal disease on dialysis
2. Left foot ulcer with wound VAC
 
#End-stage renal disease on dialysis: The patient receives his normal dialysis 
care at [facility] by Dr. Wolff. He receives HD on  Monday/
Wednesday/Friday.
-Nephrology recommendation appreciated
Patient received hemodialysis yesterday for over 3 and half hours
Next hemodialysis on 
 
 
#Left foot ulcer with wound VAC: Last time patient was here recently, he was 
offered MRI to evaluate for osteomyelitis but refused. He also refused 
rehabilitation facility placement. He is now coming in because he could not 
tolerate the pain and could not walk secondary to his medical conditions.
-wound vac was removed today
-Podiatry consult
-PT consult
-Acetaminophen plus oxycodone for pain
 
#Chronic medical problems:
-Continue aspirin and atorvastatin
 
DVT prophylaxis with heparin
Renal dialysis diet
Full code
Problem List:
 1. Cellulitis
 
 2. ESRD (end stage renal disease) on dialysis
 
Pain Ratin
Pain Location:
N/A
Pain Goal: Remain pain free
Pain Plan:
Pathway
Tomorrow's Labs & Rationales:
cbc
bep
 
 
Dewayne BEDOLLA,Karey 18 1141:
Attending MD Review Statement
 
Attending Statement
Attending MD Statement: examined this patient, discuss w/resident/PA/NP, agreed 
w/resident/PA/NP, reviewed EMR data (avail), discussed with nursing, discussed 
with case mgmt, amended to note
Attending Assessment/Plan:
Patient seen and examined, still complaining of pain in his left foot.  He also 
claims that wound VAC is not draining much so he wants someone to take a look at
his wound VAC.
 
Vital Signs
 
 
Date Time Temp Pulse Resp B/P B/P Pulse O2 O2 Flow FiO2
 
     Mean Ox Delivery Rate 
 
 0840       Room Air  
 
 0645 98.1 88 20 111/85  96 Room Air  
 
 2208  52 20 110/60  96 Room Air  
 
/ 1453 97.6 110 20 101/72  97   
 
 
on exam; 
aox3, nad
cv; s1,s2, rrr
resp; clear
abd; soft, nt, bs+
 + wound vac on left foot. 
 Laboratory Tests
 
 
 
 
 0755
 
Chemistry 
 
  Sodium (137 - 145 mmol/L) 140
 
  Potassium (3.5 - 5.1 mmol/L) 3.8
 
  Chloride (98 - 107 mmol/L) 99
 
  Carbon Dioxide (22 - 30 mmol/L) 20  L
 
  Anion Gap (5 - 16) 20  H
 
  BUN (9 - 20 mg/dL) 31  H
 
  Creatinine (0.7 - 1.2 mg/dL) 8.7 *H
 
  Estimated GFR (>60 ml/min) 7  L
 
  BUN/Creatinine Ratio (7 - 25 %) 3.6  L
 
 
A/P: 53 y/o M with [facility] ig for ESRD on dialysis Monday/Wednesday/Friday, recent 
admission to [facility] for suspected left foot osteomyelitis from chornic ulcer. 
He was discharged on thursday and never able to get up after his discharge. He 
was not able to make to dialysis on Friday as he was unable to walk.  Now 
admitted due to inability to ambulate. 
 
Please call Dr. Crocker to come and take a look at the wound VAC.
Slight drop in H&H, will monitor. At baseline. 
Continue current pain mx. 
Continue all other current meds. 
DVt px; hep sq
Dispo: will need rehab.

## 2018-04-03 NOTE — DISCHARGE SUMMARY
Visit Information
 
Visit Dates
Admission Date:
04/01/18
 
Discharge Date:
04/04/18
 
 
Hospital Course
 
Course
Attending Physician:
Karey Buchanan MD
 
Primary Care Physician:
Patient Has No Primary Care Dr
 
Hospital Course:
The patient is a 52 year old man with a past medical history of PVD, ESRD 
maintained on hemodialysis since April 2000-now on Wjwglx-Lhblnfkww-Wygqwg 
schedule, hypertension, hyperparathyroidism, CVA, seizures, gout, GERD, s/p left
upper extremity finger amputation due to chronic osteomyelitis who had recently 
been admitted on 3/19/18 at [facility] for symptoms consistent with a non-
healing left heel ulcer with concerns for left heel osteomyelitis. A right heel 
Xray at the time showed a bony defect along distal aspect of calcaneus and focal
osteomyelitis could not be excluded. His admission at that time was notable for 
the patient having an ESR of 21 mm/hr, and patient refusing an MRI of his foot 
to assess for osteomyelitis, even after being offered anxiolytic. He was 
generally uncooperative with recommendations made by the medical team and often 
used profanities with the staff. 
 
He had an incision and drainage of left heel wound by Podiatrist, Dr. Crocker 
on 3/20/18 and placement  of a wound vac. He also had a left anterior and 
posterior tibial artery, dorsalis pedis, peroneal artery angiogram and 
angioplasty by vascular surgeon Dr. Christensen on 3/23/18. He had a short 
course of antibiotics and was planned to follow up with podiatry within 2 weeks 
for repeat X-ray of his left foot to assess if further antibiotics were needed. 
He was also instructed to follow up with vascular surgery on discharge. At 
discharge, he refused arrangements for short term rehabillitation at that time 
and opted to go home instead.
 
However upon arriving home on 3/27/18, patient could not move around his house 
or make his medical appointments for 3 days due to general physical 
deconditioning. He also missed his hemodialysis session and his visiting nurse 
called 911 to bring the patient to [facility]. He was evaluated by 
nephrologist Dr. Cook and was restarted on a Aslkmr-Aabdvbcey-Anyrag 
hemodialysis schedule. During this admission, wound vac was removed and his 
wound care was done as per wound care consult recommendations with NS cleaning 
and Aquacel. His left heel wound looked necrotic and most likely will need 
further debridment by Dr Barba as outpatient . He was made aware and was given
a referral to see Dr. Barba in 1 week of discharge. He was assessed by 
physical therapy and sent to short term rehab.
 
 
 
 
Allergies:
Coded Allergies:
Iodinated Contrast- Oral and IV Dye (IODINATED CONTRAST MEDIA - IV DYE) (RASH 03
/19/18)
 
Significant Procedures:
Operative/Inv Procedure Report
Surgery Date: 03/20/18
Name of Procedure:
1 open incision and drainage deep to the fashion with exposure of the tendon and
tendon sheath multiple sites left heel
2 intraoperative administration of negative pressure wound therapy
3 intraoperative administration of ankle block anesthesia
4 excisional debridement
Pre-Operative Diagnosis:
1 open necrotic wound left heel
2 diabetic peripheral arterial disease
Post-Operative Diagnosis:
The same
Estimated Blood Loss: less than 50ml
Surgeon/Assistant:
DAIJA CROCKER DPM
 
-----------------------------------------------  -------------------------------
-------------------------------------
Operative/Inv Procedure Report
Surgery Date: 03/23/18
Name of Procedure:
-Ultrasound-guided right common femoral artery access
- Aortogram
- Third order left leg angiogram
- Angioplasty of left anterior tibial, posterior tibial, peroneal, and dorsalis 
pedis artery
Pre-Operative Diagnosis:
-Nonhealing left foot wound with no palpable pedal pulses
Post-Operative Diagnosis:
Same
Estimated Blood Loss: scant
Surgeon/Assistant:
Juan Pablo Christensen MD
 
Anesthesia: laryngeal mask airway
Pertinent Lab Results:
  SERVICE DATE: 03/19/
EXAM TYPE: RAD - XRY-HEEL, LEFT
 
EXAMINATION:
XR CALCANEUS, LEFT
 
CLINICAL INFORMATION:
Osteomyelitis
 
COMPARISON:
None
 
TECHNIQUE:
Lateral and axial views of the left calcaneus were obtained.
 
FINDINGS:
Vascular calcifications.
 
There is felt to be a bony defect along the distal aspect of the calcaneus
and certainly focal osteomyelitis here cannot be excluded. This is along the
undersurface. There may be bony fusion here.
 
Vascular calcifications.
 
IMPRESSION: As described above I cannot rule out osteomyelitis along the
undersurface of the distal calcaneus. Recommend MR pre and postcontrast
further evaluate
 
 
Disposition Summary
 
Disposition
Principal Diagnosis:
1. General physical deconditioning
2. Left heel ulcer
3. End-stage renal disease
Additional Diagnosis:
4. Peripheral vascular disease
Discharge Disposition: SNF
 
Discharge Instructions
 
General Discharge Information
Code Status: Full Code
Patient's Diet:
Renal dialysis diet
Patient's Activity:
Self limited activity. 100 % off loading on left heel.
Follow-Up Instructions/Appts:
1. Please follow up with your new primary care provider within one week of 
discharge. You have been given referral to Dr. Bryon Morales in [place]. 
 
2. Please follow-up with nephrologist, podiatrist Dr. Crocker, and vascular 
surgeon Dr. Christensen within 2 weeks of discharge. 
 
3. If you develop a fever or worsening of your heel, you can get an x-ray as an 
outpatient per podiatry.
 
4. Wound care instructions: -Please use NS for cleaning, Use Aquacel  and DPD 
for dressing daily of left heel wound. -100 % offloading of left heel.
 
 
 
Medications at Discharge
Discharge Medications:
Continue taking these medications:
Atorvastatin Calcium (Atorvastatin Calcium) 40 MG TABLET
    40 Milligram ORAL 5 PM
    Qty = 30
    Comments:
       PT DID NOT TAKE WHILE IN HOSPITAL
 
Aspirin (Ecotrin*) 81 MG TABLET.
    81 Milligram ORAL DAILY
    Qty = 30
    Comments:
       Last Taken: 4/3/18
             Time: 8 AM
 
Omeprazole (Omeprazole) 20 MG CAPSULE.
    1 Capsule ORAL DAILY
    Qty = 30
    Comments:
       Last Taken: 4/4/18
             Time: 8 AM
 
 
Copies To:
Daija Crocker DPM; Carmen BEDOLLA,Bryon LISA; Joey BEDOLLA,Jonnathan BESS; Amparo BEDOLLA,[facility]

## 2018-04-04 VITALS — SYSTOLIC BLOOD PRESSURE: 110 MMHG | DIASTOLIC BLOOD PRESSURE: 60 MMHG

## 2018-04-04 VITALS — DIASTOLIC BLOOD PRESSURE: 60 MMHG | SYSTOLIC BLOOD PRESSURE: 110 MMHG

## 2018-04-04 VITALS — SYSTOLIC BLOOD PRESSURE: 100 MMHG | DIASTOLIC BLOOD PRESSURE: 60 MMHG

## 2018-04-04 LAB
ABSOLUTE GRANULOCYTE CT: 5.4 /CUMM (ref 1.4–6.5)
BASOPHILS # BLD: 0 /CUMM (ref 0–0.2)
BASOPHILS NFR BLD: 0.3 % (ref 0–2)
EOSINOPHIL # BLD: 0.1 /CUMM (ref 0–0.7)
EOSINOPHIL NFR BLD: 1.2 % (ref 0–5)
ERYTHROCYTE [DISTWIDTH] IN BLOOD BY AUTOMATED COUNT: 21 % (ref 11.5–14.5)
GRANULOCYTES NFR BLD: 69.5 % (ref 42.2–75.2)
HCT VFR BLD CALC: 41.5 % (ref 42–52)
LYMPHOCYTES # BLD: 1.3 /CUMM (ref 1.2–3.4)
MCH RBC QN AUTO: 26.2 PG (ref 27–31)
MCHC RBC AUTO-ENTMCNC: 31.8 G/DL (ref 33–37)
MCV RBC AUTO: 82.5 FL (ref 80–94)
MONOCYTES # BLD: 1 /CUMM (ref 0.1–0.6)
PLATELET # BLD: 260 /CUMM (ref 130–400)
PMV BLD AUTO: 10.7 FL (ref 7.4–10.4)
RED BLOOD CELL CT: 5.03 /CUMM (ref 4.7–6.1)
WBC # BLD AUTO: 7.8 /CUMM (ref 4.8–10.8)

## 2018-04-04 NOTE — PN- HOUSESTAFF
Awadalla MD,Neeta 18 0710:
Subjective
Follow-up For:
Left foot ulcer with wound vac
ESRD
Subjective:
No overnight events, denies any complaints, he reports improvement of his foot 
pain after taking out the wound VAC, waiting for dialysis this morning
 
Review of Systems
Constitutional:
Reports: see HPI. 
 
Objective
Last 24 Hrs of Vital Signs/I&O
 Vital Signs
 
 
Date Time Temp Pulse Resp B/P B/P Pulse O2 O2 Flow FiO2
 
     Mean Ox Delivery Rate 
 
 0706 97.4 72 20 110/60  94   
 
 2219 97.5 70 20 118/70  96   
 
 1429       Room Air  
 
 1412 97.5 80 18 110/80  96   
 
 0840       Room Air  
 
 
 Intake & Output
 
 
  1600  0800  0000
 
Intake Total  360 300
 
Output Total  0 
 
Balance  360 300
 
    
 
Intake, Oral  360 300
 
Output, Urine  0 
 
Patient  166 lb 
 
Weight   
 
Weight  Bed scale 
 
Measurement   
 
Method   
 
 
 
 
Physical Exam
General Appearance: Alert, Oriented X3, Cooperative, No Acute Distress
HEENT: Atraumatic, PERRLA, EOMI, Mucous Membr. moist/pink
Neck: Supple, No JVD
Cardiovascular: Normal S1, Normal S2, No Murmurs
Lungs: Clear to Auscultation
Abdomen: Normal Bowel Sounds, Soft, No Tenderness
Extremities: No Clubbing, No Cyanosis, No Edema, foot wrtapped in surgical 
dressing
Vascular: Normal Pulses
 
Assessment/Plan
Assessment:
53 yo M with PMH of PVD, ESRD maintained on hemodialysis since 2000, HTN, 
hyperparathyroidism, CVA, s/p left upper extremity finger amputation due to 
chronic osteomyelitis, recently discharged after undergoing angioplasty for left
foot for nonhealing left foot ulcer who was admitted yesterday because he could 
not make his dialysis appointment. 
 
Problem list:
1. End-stage renal disease on dialysis
2. Left foot ulcer with wound VAC
 
#End-stage renal disease on dialysis: The patient receives his normal dialysis 
care at  renal [facility] in [place] by Dr. Wolff. He receives HD on  Monday/
Wednesday/Friday.
-Nephrology recommendation appreciated
Patient will receive hemodialysis today
 
 
#Left foot ulcer : Last time patient was here recently, he was offered MRI to 
evaluate for osteomyelitis but refused. He also refused rehabilitation facility 
placement. He is now coming in because he could not tolerate the pain and could 
not walk secondary to his medical conditions.
-wound vac was removed yesterday
-Wound care as per wound consult recommendation
-He was found to have necrotic tissue in his wound will need to go for 
debridement was Dr. Mcintosh in the future s outpatient
- 100% offloading as per wound consult recomm.
-PT consult: Recommended STR 
-Acetaminophen plus oxycodone for pain
reported having a BM yesterday
 
#Chronic medical problems:
-Continue aspirin and atorvastatin
 
DVT prophylaxis with heparin
Renal dialysis diet
Full code
Problem List:
 1. Foot ulcer, left
 
 2. ESRD (end stage renal disease) on dialysis
 
Pain Ratin
Pain Location:
N/A
Pain Goal: Remain pain free
Pain Plan:
pathway
Tomorrow's Labs & Rationales:
cbc
bep
 
 
Dewayne BEDOLLA,Karey 18 1129:
Attending MD Review Statement
 
Attending Statement
Attending MD Statement: examined this patient, discuss w/resident/PA/NP, agreed 
w/resident/PA/NP, reviewed EMR data (avail), discussed with nursing, discussed 
with case mgmt, amended to note
Attending Assessment/Plan:
Patient seen and examined, offers no complaints. Had a BM last night. Wound vac 
was discontinued yesterday.  I asked marcie Prieto plan to do any 
intervention while inpateint. 
 
Vital Signs
 
 
Date Time Temp Pulse Resp B/P B/P Pulse O2 O2 Flow FiO2
 
     Mean Ox Delivery Rate 
 
 0706 97.4 72 20 110/60  94   
 
 2219 97.5 70 20 118/70  96   
 
/ 1429       Room Air  
 
 1412 97.5 80 18 110/80  96   
 
 
on exam; 
aox3, nad. 
cv; s1,s2, rrr
resp; clear
abd; soft, nt, bs+
ext; no edema
 
no labs today. 
 
A/P; 51 y/o M with [facility] ig for ESRD on dialysis Monday/Wednesday/Friday, recent 
admission to [facility] for suspected left foot osteomyelitis from chornic ulcer. 
He was discharged on thursday and never able to get up after his discharge. He 
was not able to make to dialysis on Friday as he was unable to walk.  Now 
admitted due to inability to ambulate.
 
I asked marcie Prieto plan to do any intervention while inpateint.
Patient has a bed available at [facility] today. 
Had a documented BM last night. 
Medically stable for discharge after HD today.

## 2018-04-04 NOTE — PN- NEPHROLOGY
Assessment/Plan Nephrology
Assessment:
 
1.  ESRD
 
2.  Peripheral vascular disease with ulcer left heel, with wound VAC, status 
post angioplasty of left anterior tibial, posterior tibial, peroneal, and 
dorsalis pedis arteries
 
 
Suggestion:
 
1.  Hemodialysis today with ultrafiltration to his dry weight
 
2.  Okay for discharge from renal standpoint following dialysis
 
3.  He should return for his regular outpatient dialysis sessions to [facility] [place] 
 
 
 
 
Subjective
Subjective:
 
Seen with hemodialysis.  Patient offers no complaints today.  He remains 
afebrile.  Outpatient short-term rehabilitation being arranged.
 
 
 
Objective
Vital Signs and I&Os
Vital Signs
 
 
Date Time Temp Pulse Resp B/P B/P Pulse O2 O2 Flow FiO2
 
     Mean Ox Delivery Rate 
 
04/04 0706 97.4 72 20 110/60  94   
 
04/03 2219 97.5 70 20 118/70  96   
 
04/03 1429       Room Air  
 
04/03 1412 97.5 80 18 110/80  96   
 
 
 Intake & Output
 
 
 04/04 1600 04/04 0400 04/03 1600 04/03 0400 04/02 1600 04/02 0400
 
Intake Total 360 300  50 1400 600
 
Output Total 0  0  0 
 
Balance 360 300 0 50 1400 600
 
       
 
Intake,     1000 
 
Dialysate      
 
Intake, IV     0 
 
Intake, Oral 360 300  50 400 600
 
Number   1  0 
 
Bowel      
 
Movements      
 
Output, Urine 0  0  0 
 
Patient 170 lb  168 lb  178 lb 185 lb
 
Weight      
 
Weight Bed scale  Bed scale  Bed scale 
 
Measurement      
 
Method      
 
 
 
Physical Exam:
 
General: Well-developed white male in NAD
Skin: No rash or jaundice
HEENT: Conjunctivae pink, sclerae anicteric, mucous membranes moist
Neck: Without masses or thyromegaly, no supraclavicular or cervical adenopathy
Chest: Clear to P&A
Heart: Regular rate and rhythm without S3 or rub
Abdomen: Soft and nontender without palpable masses or organomegaly
Extremities: Without cyanosis or edema; left foot dressing intact with wound VAC
in place
Neuro: No lateralizing findings, no asterixis or myoclonus
 
 
 
 
Results
Pertinent Lab Results:
 Laboratory Tests
 
 
 04/04 04/04 04/03
 
 1215 0600 0755
 
Chemistry   
 
  Sodium (137 - 145 mmol/L)   140
 
  Potassium (3.5 - 5.1 mmol/L)   3.8
 
  Chloride (98 - 107 mmol/L)   99
 
  Carbon Dioxide (22 - 30 mmol/L)   20  L
 
  Anion Gap (5 - 16)   20  H
 
  BUN (9 - 20 mg/dL)   31  H
 
  Creatinine (0.7 - 1.2 mg/dL)   8.7 *H
 
  Estimated GFR (>60 ml/min)   7  L
 
  BUN/Creatinine Ratio (7 - 25 %)   3.6  L
 
  Phosphorus (2.5 - 4.5 mg/dL)   4.5
 
Hematology   
 
  CBC w Diff Pending Cancelled 
 
  WBC Pending Cancelled 
 
  RBC Pending Cancelled 
 
  Hgb Pending Cancelled 
 
  Hct Pending Cancelled 
 
  MCV Pending Cancelled 
 
  MCH Pending Cancelled 
 
  MCHC Pending Cancelled 
 
  RDW Pending Cancelled 
 
  Plt Count Pending Cancelled 
 
  MPV Pending Cancelled 
 
 
 
 
 04/02 04/01
 
 0920 1519
 
Chemistry  
 
  Sodium (137 - 145 mmol/L) 140 
 
  Potassium (3.5 - 5.1 mmol/L) 4.8 
 
  Chloride (98 - 107 mmol/L) 95  L 
 
  Carbon Dioxide (22 - 30 mmol/L) 19  L 
 
  Anion Gap (5 - 16) 25  H 
 
  BUN (9 - 20 mg/dL) 69  H 
 
  Creatinine (0.7 - 1.2 mg/dL) 15.2 *H 
 
  Estimated GFR (>60 ml/min) 3  L 
 
  BUN/Creatinine Ratio (7 - 25 %) 4.5  L 
 
Hematology  
 
  CBC w Diff NO MAN DIFF REQ 
 
  WBC (4.8 - 10.8 /CUMM) 7.2 
 
  RBC (4.70 - 6.10 /CUMM) 4.94 
 
  Hgb (14.0 - 18.0 G/DL) 13.0  L 
 
  Hct (42 - 52 %) 40.7  L 
 
  MCV (80.0 - 94.0 FL) 82.3 
 
  MCH (27.0 - 31.0 PG) 26.3  L 
 
  MCHC (33.0 - 37.0 G/DL) 31.9  L 
 
  RDW (11.5 - 14.5 %) 21.0  H 
 
  Plt Count (130 - 400 /CUMM) 207 
 
  MPV (7.4 - 10.4 FL) 11.3  H 
 
  Gran % (42.2 - 75.2 %) 64.5 
 
  Lymphocytes % (20.5 - 51.1 %) 14.8  L 
 
  Monocytes % (1.7 - 9.3 %) 19.6  H 
 
  Eosinophils % (0 - 5 %) 0.9 
 
  Basophils % (0.0 - 2.0 %) 0.2 
 
  Absolute Granulocytes (1.4 - 6.5 /CUMM) 4.6 
 
  Absolute Lymphocytes (1.2 - 3.4 /CUMM) 1.1  L 
 
  Absolute Monocytes (0.10 - 0.60 /CUMM) 1.4  H 
 
  Absolute Eosinophils (0.0 - 0.7 /CUMM) 0.1 
 
  Absolute Basophils (0.0 - 0.2 /CUMM) 0 
 
Urines  
 
  Urine Color  Cancelled
 
  Urine Clarity  Cancelled
 
  Urine pH  Cancelled
 
  Ur Specific Gravity  Cancelled
 
  Urine Protein  Cancelled
 
  Urine Ketones  Cancelled
 
  Urine Nitrite  Cancelled
 
  Urine Bilirubin  Cancelled
 
  Urine Urobilinogen  Cancelled
 
  Ur Leukocyte Esterase  Cancelled
 
  Ur Microscopic  Cancelled
 
  Urine Hemoglobin  Cancelled
 
  Urine Glucose  Cancelled
 
 
 
 
 04/01
 
 1407
 
Chemistry 
 
  Sodium (137 - 145 mmol/L) 142
 
  Potassium (3.5 - 5.1 mmol/L) 4.8
 
  Chloride (98 - 107 mmol/L) 96  L
 
  Carbon Dioxide (22 - 30 mmol/L) 18  L
 
  Anion Gap (5 - 16) 29  H
 
  BUN (9 - 20 mg/dL) 62  H
 
  Creatinine (0.7 - 1.2 mg/dL) 13.8 *H
 
  Estimated GFR (>60 ml/min) 4  L
 
  BUN/Creatinine Ratio (7 - 25 %) 4.5  L
 
  Glucose (65 - 99 mg/dL) 83
 
  Calcium (8.4 - 10.2 mg/dL) 8.0  L
 
  Total Bilirubin (0.2 - 1.3 mg/dL) 1.2
 
  AST (17 - 59 U/L) 50
 
  ALT (21 - 72 U/L) 33
 
  Alkaline Phosphatase (< 127 U/L) 95
 
  Total Protein (6.3 - 8.2 g/dL) 8.3  H
 
  Albumin (3.5 - 5.0 g/dL) 4.1
 
  Globulin (1.9 - 4.2 gm/dL) 4.2
 
  Albumin/Globulin Ratio (1.1 - 2.2 %) 1.0  L
 
Hematology 
 
  CBC w Diff MAN DIFF ORDERED
 
  WBC (4.8 - 10.8 /CUMM) 6.2
 
  RBC (4.70 - 6.10 /CUMM) 5.80
 
  Hgb (14.0 - 18.0 G/DL) 15.1
 
  Hct (42 - 52 %) 48.3
 
  MCV (80.0 - 94.0 FL) 83.2
 
  MCH (27.0 - 31.0 PG) 26.1  L
 
  MCHC (33.0 - 37.0 G/DL) 31.3  L
 
  RDW (11.5 - 14.5 %) 20.6  H
 
  Plt Count (130 - 400 /CUMM) 212
 
  MPV (7.4 - 10.4 FL) 11.8  H
 
  Gran % (42.2 - 75.2 %) 79.5  H
 
  Lymphocytes % (20.5 - 51.1 %) 7.2  L
 
  Monocytes % (1.7 - 9.3 %) 12.7  H
 
  Eosinophils % (0 - 5 %) 0.3
 
  Basophils % (0.0 - 2.0 %) 0.3
 
  Absolute Granulocytes (1.4 - 6.5 /CUMM) 4.9
 
  Segmented Neutrophils (42.2 - 75.2 %) 73
 
  Band Neutrophils (0.0 - 5.0 %) 9  H
 
  Absolute Lymphocytes (1.2 - 3.4 /CUMM) 0.4  L
 
  Lymphocytes (20.5 - 51.1 %) 8  L
 
  Monocytes (1.7 - 9.3 %) 9
 
  Absolute Monocytes (0.10 - 0.60 /CUMM) 0.8  H
 
  Absolute Eosinophils (0.0 - 0.7 /CUMM) 0
 
  Basophils (0.0 - 2.0 %) 1
 
  Absolute Basophils (0.0 - 0.2 /CUMM) 0
 
  Platelet Estimate (ADEQUATE) VERIFIED BY SMEAR
 
  Polychromasia 1+
 
  Anisocytosis 1+

## 2018-04-26 ENCOUNTER — HOSPITAL ENCOUNTER (INPATIENT)
Dept: HOSPITAL 68 - ERH | Age: 53
LOS: 13 days | Discharge: NURSING, REHAB OR LONG-TERM CARE | DRG: 463 | End: 2018-05-09
Attending: INTERNAL MEDICINE | Admitting: INTERNAL MEDICINE
Payer: COMMERCIAL

## 2018-04-26 VITALS — WEIGHT: 172.25 LBS | BODY MASS INDEX: 22.83 KG/M2 | HEIGHT: 73 IN

## 2018-04-26 VITALS — DIASTOLIC BLOOD PRESSURE: 74 MMHG | SYSTOLIC BLOOD PRESSURE: 100 MMHG

## 2018-04-26 DIAGNOSIS — Z91.15: ICD-10-CM

## 2018-04-26 DIAGNOSIS — E21.3: ICD-10-CM

## 2018-04-26 DIAGNOSIS — K59.00: ICD-10-CM

## 2018-04-26 DIAGNOSIS — Z89.021: ICD-10-CM

## 2018-04-26 DIAGNOSIS — M10.9: ICD-10-CM

## 2018-04-26 DIAGNOSIS — I12.0: ICD-10-CM

## 2018-04-26 DIAGNOSIS — I73.9: ICD-10-CM

## 2018-04-26 DIAGNOSIS — L97.519: ICD-10-CM

## 2018-04-26 DIAGNOSIS — Z91.14: ICD-10-CM

## 2018-04-26 DIAGNOSIS — M86.172: Primary | ICD-10-CM

## 2018-04-26 DIAGNOSIS — F55.8: ICD-10-CM

## 2018-04-26 DIAGNOSIS — T39.91XS: ICD-10-CM

## 2018-04-26 DIAGNOSIS — N18.6: ICD-10-CM

## 2018-04-26 DIAGNOSIS — Z99.2: ICD-10-CM

## 2018-04-26 DIAGNOSIS — J90: ICD-10-CM

## 2018-04-26 DIAGNOSIS — A49.02: ICD-10-CM

## 2018-04-26 DIAGNOSIS — L98.493: ICD-10-CM

## 2018-04-26 DIAGNOSIS — K21.9: ICD-10-CM

## 2018-04-26 DIAGNOSIS — L97.529: ICD-10-CM

## 2018-04-26 DIAGNOSIS — R00.0: ICD-10-CM

## 2018-04-26 LAB
ABSOLUTE GRANULOCYTE CT: 7.1 /CUMM (ref 1.4–6.5)
BASOPHILS # BLD: 0.1 /CUMM (ref 0–0.2)
BASOPHILS NFR BLD: 0.7 % (ref 0–2)
EOSINOPHIL # BLD: 0.2 /CUMM (ref 0–0.7)
EOSINOPHIL NFR BLD: 2.1 % (ref 0–5)
ERYTHROCYTE [DISTWIDTH] IN BLOOD BY AUTOMATED COUNT: 17.6 % (ref 11.5–14.5)
GRANULOCYTES NFR BLD: 76.4 % (ref 42.2–75.2)
HCT VFR BLD CALC: 43.3 % (ref 42–52)
LYMPHOCYTES # BLD: 1.2 /CUMM (ref 1.2–3.4)
MCH RBC QN AUTO: 27.1 PG (ref 27–31)
MCHC RBC AUTO-ENTMCNC: 32.9 G/DL (ref 33–37)
MCV RBC AUTO: 82.2 FL (ref 80–94)
MONOCYTES # BLD: 0.8 /CUMM (ref 0.1–0.6)
PLATELET # BLD: 264 /CUMM (ref 130–400)
PMV BLD AUTO: 9.4 FL (ref 7.4–10.4)
RED BLOOD CELL CT: 5.27 /CUMM (ref 4.7–6.1)
WBC # BLD AUTO: 9.3 /CUMM (ref 4.8–10.8)

## 2018-04-26 PROCEDURE — C1724 CATH, TRANS ATHEREC,ROTATION: HCPCS

## 2018-04-26 PROCEDURE — C1725 CATH, TRANSLUMIN NON-LASER: HCPCS

## 2018-04-26 PROCEDURE — 2NBSP: CPT

## 2018-04-26 PROCEDURE — 87075 CULTR BACTERIA EXCEPT BLOOD: CPT

## 2018-04-26 PROCEDURE — 87184 SC STD DISK METHOD PER PLATE: CPT

## 2018-04-26 PROCEDURE — C1760 CLOSURE DEV, VASC: HCPCS

## 2018-04-26 PROCEDURE — 87070 CULTURE OTHR SPECIMN AEROBIC: CPT

## 2018-04-26 NOTE — RADIOLOGY REPORT
EXAMINATION:
XR FOOT, BILATERAL
 
CLINICAL INFORMATION:
Chronic foot wounds.
 
COMPARISON:
Left heel 03/19/2018.
 
TECHNIQUE:
3 views of each foot.
 
FINDINGS:
Diffuse osteopenia of both feet with joint space narrowing and patchy
sclerosis throughout the midfoot which is bilateral and symmetric. Extensive
vascular calcifications. No definite fracture of either foot. Bilateral heel
spurs.
 
There is a soft tissue ulcer at the posterior aspect of the left heel which
has progressed since 03/19/2018, potentially with an exposed enthesophyte at
the Achilles tendon insertion. Subtle cortical erosion at the base of the
enthesophyte.
 
IMPRESSION:
Diffuse osteopenia and extensive vascular calcifications bilaterally.
Progression of the soft tissue ulcer posterior to the left calcaneus.
Suspected osteomyelitis at the base of the enthesophyte. This could be
confirmed with MRI if indicated.

## 2018-04-26 NOTE — ED UPPER/LOWER EXTREMITY COMPL
History of Present Illness
 
General
Chief Complaint: Lower Extremity Problems
Stated Complaint: BIBA FOR ALEXANDRE FOOT PAIN
Source: patient
Exam Limitations: no limitations
 
Vital Signs & Intake/Output
Vital Signs & Intake/Output
 Vital Signs
 
 
Date Time Temp Pulse Resp B/P B/P Pulse O2 O2 Flow FiO2
 
     Mean Ox Delivery Rate 
 
 1801 97.3 82 20 122/88  94 Room Air  
 
 1616 97.2 80 18 133/89  95 Room Air  
 
 1206  90 18 128/89  94 Room Air  
 
 
 
Allergies
Coded Allergies:
Iodinated Contrast- Oral and IV Dye (IODINATED CONTRAST MEDIA - IV DYE) (RASH )
 
Reconcile Medications
Aspirin (Ecotrin*) 81 MG TABLET.   81 MG PO DAILY Heart
Atorvastatin Calcium 40 MG TABLET   40 MG PO 1700 Cholesterol
Omeprazole 20 MG CAPSULE.   1 CAP PO DAILY GI  (Reported)
 
Triage Note:
PT BIBA FROM HOME FOR BILATERAL FOOT PAIN AND
 INABILITY TO AMBULATE. RECENT DC FROM STR S/P
 BILATERAL HEEL DEBRIEDMENT 4WKS AGO. REQUESTING
 MORE REHAB.
Triage Nurses Notes Reviewed? yes
Onset: Gradual
Duration: week(s):, constant, continues in ED, getting worse
Severity: severe
HPI:
Patient presents for evaluation of severe bilateral foot pain as a result of 
chronic wounds.  He states his wounds have been debrided surgically by Dr. Crocker and he has been in short-term rehabilitation facility up until about 2 
days ago.  Although he was discharged from the rehabilitation facility he is 
still having severe bilateral foot pain and is unable to ambulate.  He has in 
fact missed dialysis because of his inability to ambulate.
 
Past History
 
Travel History
Traveled to [place] past 21 day No
 
Medical History
Any Pertinent Medical History? see below for history
Neurological: migraine
EENT: NONE
Cardiovascular: hypertension
Respiratory: collapsed left lung with a persistent left pleural effusion.
Gastrointestinal: GERD, GI BLEED
Hepatic: NONE
Renal: ESRD on HD, DIALYSIS M-W-F ACCESS L LEG
Musculoskeletal: gout, osteoarthritis, secondary OSTEOPOROSIS GANGREEN L 2ND 
FINGER
Psychiatric: NONE
Endocrine: hyperparathyroidism
Blood Disorders: NONE
Cancer(s): NONE
GYN/Reproductive: NONE
Other Medical Hx:
In  he had an infected abscess with MSSA bacteremia.
 Complete central venous occlusion in .
History of MRSA: No
History of VRE: No
History of CDIFF: No
Influenza Vaccine: 17
 
Surgical History
Surgical History: GRAFT, LEFT THIGH s/p placement of numerous August catheters/also
dual-lumen Amor's left radial cephalic primary AVF on 2000 left upper 
arm AVG Tenckhoff catheter placement and subsequent removal status post 
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
 
Psychosocial History
Who do you live with Family
Services at Home Nursing
What is your primary language English
Tobacco Use: Never used
ETOH Use: denies use
 
Family History
Family History, If Any:
MOTHER (Heart Dz, Breast Ca).
FATHER (Diabetes).
Relation not specified for:
  FH: stroke
 
Hx Contributory? No
 
Review of Systems
 
Review of Systems
Constitutional:
Reports: no symptoms. 
EENTM:
Reports: no symptoms. 
Respiratory:
Reports: no symptoms. 
Cardiovascular:
Reports: no symptoms. 
Gastrointestinal/Abdominal:
Reports: no symptoms. 
Genitourinary:
Reports: no symptoms. 
Musculoskeletal:
Reports: see HPI. 
Skin:
Reports: see HPI. 
Neurological/Psychological:
Reports: no symptoms. 
Hematologic/Endocrine:
Reports: no symptoms. 
Immunological:
Reports: no symptoms. 
All Other Systems: Reviewed and Negative
 
Physical Exam
 
Physical Exam
General Appearance: see below
Comments:
Gen.: Well-nourished, well-developed, no acute respiratory distress.
Head: Normocephalic, atraumatic.
Eyes: Normal inspection bilaterally
Ears: Normal inspection bilaterally
Nose: Normal inspection
Throat/mouth : Moist mucosa 
Neck: Supple, full range of motion, no goiter
Heart: Regular rate and rhythm
Lungs: Quiet respirations
Back: Normal range of motion
Extremities: Hands: Surgical absence of fingers, Feet: Bilateral chronic wounds 
with mild drainage and erythema but no convincing signs of cellulitis
Neurologic: Cranial nerves grossly intact, speech is clear
Skin: warm and dry
Psychiatric: Calm, cooperative, no apparent delusions or hallucinations
 
Progress
Differential Diagnosis: cellulitis, contusion, fracture, gout, sprain, 
OSTEOMYELITIS
Plan of Care:
 Orders
 
 
Procedure Date/time Status
 
Regular Diet  B Active
 
PHOSPHORUS  1349 Complete
 
MAGNESIUM  1349 Complete
 
CBC WITHOUT DIFFERENTIAL  1349 Complete
 
CALCIUM  1349 Complete
 
BASIC METABOLIC PANEL  1349 Complete
 
 
 Laboratory Tests
 
 
 
18 1448:
Anion Gap 30  H, Estimated GFR 3  L, BUN/Creatinine Ratio 4.0  L, Glucose 62  L,
Calcium 6.9  L, Phosphorus 9.3  H, Magnesium 1.9, CBC w Diff NO MAN DIFF REQ, 
RBC 5.27, MCV 82.2, MCH 27.1, MCHC 32.9  L, RDW 17.6  H, MPV 9.4, Gran % 76.4  H
, Lymphocytes % 12.7  L, Monocytes % 8.1, Eosinophils % 2.1, Basophils % 0.7, 
Absolute Granulocytes 7.1  H, Absolute Lymphocytes 1.2, Absolute Monocytes 0.8  
H, Absolute Eosinophils 0.2, Absolute Basophils 0.1
 
Diagnostic Imaging:
Discussed w/RAD: Radiology Read. 
Radiology Impression: PATIENT: JC CANDELARIA  MEDICAL RECORD NO: [id number] 
PRESENT AGE: 52  PATIENT ACCOUNT NO: [id number] : [birth year]  LOCATION: [facility], [place] 
ORDERING PHYSICIAN: Rupert Rodriges MD     SERVICE DATE:  EXAM TYPE
: RAD - XRY-FOOT COMPLETE, LEFT; XRY-FOOT COMPLETE, R EXAMINATION: XR FOOT, 
BILATERAL CLINICAL INFORMATION: Chronic foot wounds. COMPARISON: Left heel . TECHNIQUE: 3 views of each foot. FINDINGS: Diffuse osteopenia of both 
feet with joint space narrowing and patchy sclerosis throughout the midfoot 
which is bilateral and symmetric. Extensive vascular calcifications. No definite
fracture of either foot. Bilateral heel spurs. There is a soft tissue ulcer at 
the posterior aspect of the left heel which has progressed since 2018, 
potentially with an exposed enthesophyte at the Achilles tendon insertion. 
Subtle cortical erosion at the base of the enthesophyte. IMPRESSION: Diffuse 
osteopenia and extensive vascular calcifications bilaterally. Progression of the
soft tissue ulcer posterior to the left calcaneus. Suspected osteomyelitis at 
the base of the enthesophyte. This could be confirmed with MRI if indicated. 
DICTATED BY: Lamine Taylor MD  DATE/TIME DICTATED:18 
:RAD.TONY  DATE/TIME TRANSCRIBED:18 CONFIDENTIAL, 
DO NOT COPY WITHOUT APPROPRIATE AUTHORIZATION.  <Electronically signed in Other 
Vendor System>                                                                  
                     SIGNED BY: Lamine Taylor MD 18 1456
Comments:
2018 5:56:56 PM I have updated Jc on his test results.  He has been 
unable to ambulate secondary to pain.  He feels that if the pain is controlled 
and he can walk he can follow through with his dialysis appointment tomorrow.  
Given the appearance of the x-rays I have paged Dr. Crocker.
 
2018 6:00:26 PM patient's case discussed with Dr. Crocker who feels that 
the patient can be followed in the wound clinic on Monday.  Antibiotics not 
necessary at this time.
 
2018 7:39:46 PM despite oral narcotic pain relievers patient is unable to 
ambulate.  Case discussed with case management.  Hospitalist pagelinda.
 
Departure
 
Departure
Disposition: STILL A PATIENT
Condition: Stable
Clinical Impression
Primary Impression: Foot pain
Qualifiers:  Laterality: bilateral Qualified Codes: M79.671 - Pain in right foot
; M79.672 - Pain in left foot
Secondary Impressions: History of renal failure, Inability to ambulate due to 
ankle or foot
Referrals:
Patient Has No Primary Care Dr (PCP/Family)
 
Additional Instructions:
Please follow up with the wound care center at 12 PM on Monday.
Departure Forms:
Customer Survey
General Discharge Information
 
Admission Note
Spoke With:
Siobhan Rivers MD
Documentation of Exam:
Documentation of any treatments & extenuating circumstances including Concerns 
Regarding Discharge (functional status, medication knowledge or non-compliance, 
living conditions, etc.) that warrant an admission rather than observation: 
Patient is unable to ambulate secondary to bilateral foot pain as a result of 
chronic wounds.  He cannot be treated as an outpatient under the circumstances. 
He has already missed one dialysis session because he is unable to ambulate and 
would likely miss a second dialysis session, placing him at risk of hyperkalemia
, dysrhythmia, acidosis, dyspnea and volume overload.  His bilateral foot pain 
has destabilized his gait and any attempt to ambulate would likely result in 
falling with subsequent injury.  I feel he now requires hospitalization for pain
management and wound care.  He should receive dialysis to prevent complications 
of his renal failure.  Physical therapy consultation should be obtained to 
assess his ability to ambulate and recommend treatment.  Wound care center 
consultation should also be considered given the patient's chronic wounds.  
Given these considerations I feel this patient will require a multiple day 
hospitalization.

## 2018-04-26 NOTE — HISTORY & PHYSICAL
Leyla Hadley 04/26/18 2006:
General Information and HPI
MD Statement:
I have seen and personally examined JC CANDELARIA and documented this H&P.
 
The patient is a 52 year old M who presented with a patient stated chief 
complaint of [Bilateral feet pain].
 
Source of Information: patient, old records
Exam Limitations: no limitations
History of Present Illness:
Mr. Candelaria is a 53yo M w/ PMH of HTN, history of collapsed left lung, GERD, ESRD 
on HD on MWF through left thigh catheter, HyperPTH, Hx of MSSA Bacteremia in 
2004
Patient was brought in by ambulance from home for bilateral foot pain and 
inability to walk.  Patient was recently underwent right heel debridement at 
[facility], and was discharged to [place] rehab for the past 3 weeks, and 
he was discharged home 4/22/2018, with his last dialysis being done on 4/20/
2018.  Patient was scheduled for dialysis Monday Wednesday Friday, however prior
to this admission, he missed 2 dialysis episodes.  He stated that doing the free
weeks and rehab, he was not ambulating and he did not receive adequate physical 
therapy to help him ambulate.  He was not able to ambulate after he was 
discharged to home.  Therefore he had missed 2 episodes of dialysis.  He also 
claims of low appetite, however denied any diarrhea or constipation.
Patient stated that he only takes Nexium at home as well, and denied taking 
aspirin/Lipitor.
 
During our clinical interaction, patient denied fever/night sweat/weight change/
cough/SOB/Chest Pain/Palpitation/exercise intolerance/Abdominal pain/bowel 
movement/urinary abnormality, or other skin/musculoskeletal/neurological 
disorders/mood change/insomnia/dietary/appetite change.
 
 
Allergies/Medications
Allergies:
Coded Allergies:
Iodinated Contrast- Oral and IV Dye (IODINATED CONTRAST MEDIA - IV DYE) (RASH 03
/19/18)
 
Home Med list
Aspirin (Ecotrin*) 81 MG TABLET.   81 MG PO DAILY Heart
Atorvastatin Calcium 40 MG TABLET   40 MG PO 1700 Cholesterol
Omeprazole 20 MG CAPSULE.   1 CAP PO DAILY GI  (Reported)
 
 
Past History
 
Travel History
Traveled to [place] past 21 day No
 
Medical History
Neurological: migraine
EENT: NONE
Cardiovascular: hypertension
Respiratory: collapsed left lung with a persistent left pleural effusion.
Gastrointestinal: GERD, GI BLEED
Hepatic: NONE
Renal: ESRD on HD, DIALYSIS M-W-F ACCESS L LEG
Musculoskeletal: gout, osteoarthritis, secondary OSTEOPOROSIS GANGREEN L 2ND 
FINGER
Psychiatric: NONE
Endocrine: hyperparathyroidism
Blood Disorders: NONE
Cancer(s): NONE
GYN/Reproductive: NONE
Other Medical Hx:
In 2004 he had an infected abscess with MSSA bacteremia.
 Complete central venous occlusion in 2007.
History of MRSA: No
History of VRE: No
History of CDIFF: No
Influenza Vaccine: 09/01/17
 
Surgical History
Surgical History: GRAFT, LEFT THIGH s/p placement of numerous August catheters/also
dual-lumen Amor's left radial cephalic primary AVF on 03/28/2000 left upper 
arm AVG Tenckhoff catheter placement and subsequent removal status post 
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
 
Past Family/Social History
 
Family History
Relations & Conditions if any
MOTHER (Heart Dz, Breast Ca).
FATHER (Diabetes).
Relation not specified for:
  FH: stroke
 
 
Psychosocial History
Who Do You Live With? self
Services at Home: Nursing
Primary Language: English
Smoking Status: Former Smoker
ETOH Use: denies use
Illicit Drug Use: denies illicit drug use
 
Functional Ability
ADLs
Independent: dressing, eating. 
Ambulation: cane
IADLs
Independent: shopping, housework, finances. 
 
Review of Systems
 
Review of Systems
Constitutional:
Reports: see HPI. 
 
Exam & Diagnostic Data
Last 24 Hrs of Vital Signs/I&O
 Vital Signs
 
 
Date Time Temp Pulse Resp B/P B/P Pulse O2 O2 Flow FiO2
 
     Mean Ox Delivery Rate 
 
04/26 1801 97.3 82 20 122/88  94 Room Air  
 
04/26 1616 97.2 80 18 133/89  95 Room Air  
 
04/26 1206  90 18 128/89  94 Room Air  
 
 
 Intake & Output
 
 
 04/26 1600 04/26 0800 04/26 0000
 
Intake Total 0  
 
Output Total   
 
Balance 0  
 
    
 
Intake, Oral 0  
 
Patient 83.915 kg  
 
Weight   
 
Weight Reported by Patient  
 
Measurement   
 
Method   
 
 
 
 
Physical Exam
General Appearance Alert, Oriented X3, Cooperative, No Acute Distress
Skin No Breakdown, No Significant Lesion, chronic ulcers healed on BLE
Skin Temp/Moisture Exam: Warm/Dry
Sepsis Skin Exam (color): Normal for Ethnicity
HEENT Atraumatic
Neck Supple, No JVD
Cardiovascular Regular Rate
Lungs Clear to Auscultation, Normal Air Movement
Abdomen Normal Bowel Sounds, Soft, No Tenderness
Neurological Normal Speech
Extremities No Cyanosis, No Edema, BL feet in surgical dressing, no pain on 
examination, however patient refused us to remove the surgical dressing
Last 24 Hrs of Labs/Arnold:
 Laboratory Tests
 
04/26/18 1448:
Anion Gap 30  H, Estimated GFR 3  L, BUN/Creatinine Ratio 4.0  L, Glucose 62  L,
Calcium 6.9  L, Phosphorus 9.3  H, Magnesium 1.9, CBC w Diff NO MAN DIFF REQ, 
RBC 5.27, MCV 82.2, MCH 27.1, MCHC 32.9  L, RDW 17.6  H, MPV 9.4, Gran % 76.4  H
, Lymphocytes % 12.7  L, Monocytes % 8.1, Eosinophils % 2.1, Basophils % 0.7, 
Absolute Granulocytes 7.1  H, Absolute Lymphocytes 1.2, Absolute Monocytes 0.8  
H, Absolute Eosinophils 0.2, Absolute Basophils 0.1
 
 
Assessment/Plan
Assessment:
Mr. Candelaria is a 53yo M w/ PMH of HTN, history of collapsed left lung, GERD, ESRD 
on HD on MWF through left thigh catheter, HyperPTH, Hx of MSSA Bacteremia in 
2004
Patient was brought in by ambulance from home for bilateral foot pain and 
inability to walk.  Patient was recently underwent right heel debridement at 
[facility], and was discharged to [facility] rehab for the past 3 weeks, and 
he was discharged home 4/22/2018, with his last dialysis being done on 4/20/
2018.  Patient was scheduled for dialysis Monday Wednesday Friday, however prior
to this admission, he missed 2 dialysis episodes.  He stated that doing the free
weeks and rehab, he was not ambulating and he did not receive adequate physical 
therapy to help him ambulate.  He was not able to ambulate after he was 
discharged to home.  Therefore he had missed 2 episodes of dialysis.  He also 
claims of low appetite, however denied any diarrhea or constipation.
Patient stated that he only takes Nexium at home as well, and denied taking 
aspirin/Lipitor.
 
During our clinical interaction, patient denied fever/night sweat/weight change/
cough/SOB/Chest Pain/Palpitation/exercise intolerance/Abdominal pain/bowel 
movement/urinary abnormality, or other skin/musculoskeletal/neurological 
disorders/mood change/insomnia/dietary/appetite change.
 
 
On admission,
Vitals: Stable afebrile, /88, room air
 
-CBC: Unremarkable,
-BMP: K5.3, phosphorus 9.3, CA 6.9, MG 1.9, CR 16.2, CL 86,
-UA/Microbiology: History of MSSA, pseudomonas, Diphtheria
 
-BL Foot XR: Diffuse osteopenia and extensive vascular calcifications 
bilaterally. Progression of the soft tissue ulcer posterior to the left 
calcaneus. Suspected osteomyelitis at the base of the enthesophyte. This could 
be confirmed with MRI if indicated.
-Interventions in ER: Vicodin
 
 
Problem list & Assessment: 
#ESRD on hemodialysis: He has missed 2 days of dialysis, with elevated 
creatinine of 16, and electrolytes derangements.  He was not taking any home 
medications except Nexium.
#Left foot osteomyelitis: Patient cannot undergo MRI, and may need further 
podiatry procedures.
#Right foot status post debridement
 
Hospital Course:
- Admit to general medicine floor
-Pending nephrology consult for his hemodialysis schedule for tomorrow
-Pending ID consult
-Pending podiatry consult
-Pending wound care consult
-We will monitor off antibiotics for now
-Pain control with pain pathway
 
 
DVT prophylaxis Heparin + ALPS
Regular Diet
Full Code
 
 
As Ranked By This Provider
Problem List:
 1. Cellulitis
 
 
Core Measures/Misc (9/17)
 
Acute Coronary Syndrome
ACS Diagnosis: No
 
Congestive Heart Failure
Congestive Heart Failure Diagnosis No
 
Cerebrovascular Accident
CVA/TIA Diagnosis: No
 
VTE (View Protocol)
VTE Risk Factors Age>40
No Mechanical VTE Prophylaxis d/t N/A MechProphylax Ordered
No VTE Pharm Prophylaxis d/t NA PharmProphylax ordered
 
Sepsis (View protocol)
Sepsis Present: No
 
 
Basia Parada 04/26/18 2142:
Resident Review Statement
Resident Statement: examined this patient, discussed with intern
Other Findings:
Patient is a 52-year-old male with past medical history of peripheral vascular 
disease, ESRD on dialysis(Monday/Wednesday/Friday), hypertension, 
hyperparathyroidism, CVA, seizures, gout, GERD, s/p left finger amputation 
secondary to chronic osteomyelitis, recently discharged from [place] on 4/4/2018
after left heel debridement for suspected osteomyelitis presents to the ED with 
a chief complaint of bilateral foot weakness, and inability to walk.  
Patient was discharged on 4/4/2018 status post left foot ulcer debridement by 
Dr. Crocker to short-term rehabilitation.  He reports that he hasn't had good 
physical therapy at the rehabilitation place and had no improvement in his 
strength.  He was discharged from rehabilitation last Sunday and he continued to
have foot pain and weakness and was unable to keep up his dialysis appointments.
 His last dialysis was Friday(04/20/2018) and after that he missed the next 2 
sessions.  He didn't follow up his appointment with Dr. Crocker post discharge 
and reports that there is no for the debridements planned.  Denies any fevers, 
chills, rashes, nausea, vomiting, sick contacts, chest pain, palpitations, 
urinary or bowel symptoms at home.  During his last admission, his wound VAC was
removed .  He underwent debridement by Dr. Crocker and also left foot and she'
ll by vascular surgeon.  He was watched off antibiotics, culture showed no 
growth.  Patient was offered MRI to evaluate the left foot for ulcer he refused 
since he gets claustrophobic in the machine.
 
Vitals at admission was stable labs significant for a potassium of 5.3, chloride
86, BUNs 64, creatinine 16.2, GFR 3, phosphorus 9.3, calcium 6.9 
 
Foot x-rays showed diffuse osteopenia with extensive vascular calcifications, 
progression of the ulcer of the left calcaneus questionable osteomyelitis.  MRI 
recommended
 
Physical exam:
General awake, alert, oriented, no distress.
HEENT: PERRLA, EOMI
Chest.  Breath sounds
CVS: S1 and S2 heard
Abdomen: BS positive, no tenderness
Extremities: AV fistula on left thigh, bruit+ scar in upper back, second left 
hand finger amputated, intact dressings on bilateral foot, skin breaks noted in 
the left foot and diminished pulses bilaterally.
 
Assessment
Bilateral foot pain, weakness, inability to walk
Left foot chronic ulcer?  Osteomyelitis refused MRI in the past)
End-stage renal disease on dialysis 
 
Plan
Admit to [facility]
Vitals per protocol
Patient has no leukocytosis, no fever, will watch him off antibiotics for now
Nephrology consult for dialysis tomorrow
ID consult for left foot ulcer.  
Physical therapy consult 
Podiatry consult 
 PT consult and wound consult
DVT prophylaxis subcutaneous heparin
Full code
 
 
Tita,Aartee 04/27/18 0504:
Attending MD Review Statement
 
Attending Statement
Attending MD Statement: examined this patient, discuss w/resident/PA/NP, agreed 
w/resident/PA/NP, reviewed EMR data (avail), reviewed images, amended to note
Attending Assessment/Plan:
 
CC: Pain in feet, inability to ambulate
PMH: Peripheral vascular disease, ESRD on hemodialysis, history of CVA, seizure,
gout
 
Patient was initially admitted from March 19 to March 29, underwent I and D for 
left heel wound by podiatrist on 03/20/2018, followed by vascular procedure with
angioplasty of left anterior tibial and posterior tibial peroneal and dorsalis 
pedis on 03/23/2018. Patient was discharged on wound VAC but no antibiotics. 
Patient came back in 2 days and was admitted on April 1 until April 5 for 
inability to ambulate, Wound VAC was discontinued during that visit and patient 
was discharged to rehabilitation, not on antibiotics. Patient was discharged 
from rehabilitation approximately 3-4 days back. According to him they were 
barely any physical therapy, he was not ambulating much and he is left foot 
wound was getting worse. His bilateral feet pain was so worse that he could not 
ambulate at home, Mr. dialysis for 2 days and he came back to ER. He denies any 
fever or chills at home, he has discharge from the left heel wound but otherwise
complete ROS unremarkable. 
 
Vitals: Afebrile, pulse 90, RR 18, blood pressure 128/89, saturating 94% on room
air.
On exam: A O 3, cooperative, no acute distress, thin built, neck supple, JVD 
normal, no lymphadenopathy, mucosa moist, no focal neurological deficit, no 
dependent edema, CVS: S1-S2, RRR. RS: Clear to auscultate bilaterally. Abdomen: 
Soft, NT, ND, bowel sounds present. Multiple areas of dry necrosis, lateral 
aspect of right heel, medial aspect of the right arm, left thumb, left fourth 
digit, fistula on left thigh. He has left heel wound which has necrotic scab and
pus discharge from the wound. 
 
Bilaterally feet x-ray: 
Diffuse osteopenia and extensive vascular calcifications bilaterally.
Progression of the soft tissue ulcer posterior to the left calcaneus.
Suspected osteomyelitis at the base of the enthesophyte. This could be
confirmed with MRI if indicated.
 
Assessment and plan
 
52-year-old male with past medical history significant for ESRD on hemodialysis 
and peripheral vascular disease, noncompliant with phosphate binders presented 
in ER for pain in bilateral feet, inability to ambulate. Patient was admitted 
twice so far in last 1 month as mentioned above. Patient appears to have chronic
osteomyelitis but he is refusing closed or open MRI. Previous bone cultures were
negative and blood culture grew diphtheroids, so far he has not been treated 
with antibiotics. He was on wound VAC temporarily which was removed in previous 
hospitalization. 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 
wound is complicated with osteomyelitis and infection. Patient needs debridement
, we will get podiatry involved. Consult infectious diseases for any antibiotic 
plan. Patient has been noncompliant with hemodialysis last 2 times because of 
inability to ambulate, will get nephrology involved for scheduled dialysis. 
 
+ Suspected left heel osteomyelitis with superficial ulcer and wound infection
+ Multiple necrotic wounds
+ History of ESRD on hemodialysis
 
- Admit to general medicine
- Podiatry consult
- Infectious disease consult
- Nephrology consult for hemodialysis
- OT PT evaluation
- Wound care consult
- Hold off antibiotics for now
- I counseled him regarding use of phosphate binders, he refused. Patient also 
refused MRI.

## 2018-04-27 VITALS — DIASTOLIC BLOOD PRESSURE: 68 MMHG | SYSTOLIC BLOOD PRESSURE: 92 MMHG

## 2018-04-27 PROCEDURE — 5A1D70Z PERFORMANCE OF URINARY FILTRATION, INTERMITTENT, LESS THAN 6 HOURS PER DAY: ICD-10-PCS | Performed by: INTERNAL MEDICINE

## 2018-04-27 NOTE — CONS- PODIATRY
General Information and HPI
 
Consulting Request
Date of Consult: 04/27/18
Requested By:
Karey Buchanan MD
 
Reason for Consult:
Necrotic ulcer left ankle
History of Present Illness:
Kb is a 52-year-old diabetic with a necrotic wound to the posterior aspect 
of his left heel.  Of note, the patient underwent a debridement approximately a 
month ago with intervention under the vascular service and developed interval 
worsening, in part due to significant issues having to do with noncompliance.
 
Allergies/Medications
Allergies:
Coded Allergies:
Iodinated Contrast- Oral and IV Dye (IODINATED CONTRAST MEDIA - IV DYE) (RASH 03
/19/18)
 
Home Med List:
Aspirin (Ecotrin*) 81 MG TABLET.   81 MG PO DAILY Heart
Atorvastatin Calcium 40 MG TABLET   40 MG PO 1700 Cholesterol
Omeprazole 20 MG CAPSULE.   1 CAP PO DAILY GI  (Reported)
 
 
Past History
 
Medical History
Neurological: migraine
EENT: NONE
Cardiovascular: hypertension
Respiratory: collapsed left lung with a persistent left pleural effusion.
Gastrointestinal: GERD, GI BLEED
Hepatic: NONE
Renal: ESRD on HD, DIALYSIS M-W-F ACCESS L LEG
Musculoskeletal: gout, osteoarthritis, secondary OSTEOPOROSIS GANGREEN L 2ND 
FINGER
Psychiatric: NONE
Endocrine: hyperparathyroidism
Blood Disorders: NONE
Cancer(s): NONE
GYN/Reproductive: NONE
Other Medical Hx:
In 2004 he had an infected abscess with MSSA bacteremia.
 Complete central venous occlusion in 2007.
 
Surgical History
Pertinent Surgical History: GRAFT, LEFT THIGH s/p placement of numerous August 
catheters/also dual-lumen Amor's left radial cephalic primary AVF on 03/28/
2000 left upper arm AVG Tenckhoff catheter placement and subsequent removal 
status post amputation of the left index finger for gangrene
 
Family History
Relations & Conditions If Any:
MOTHER (Heart Dz, Breast Ca).
FATHER (Diabetes).
Relation not specified for:
  FH: stroke
 
 
Psychosocial History
Where Do You Live? Home
Who Do You Live With? self
Services at Home: Nursing
Primary Language: English
Smoking Status: Former Smoker
ETOH Use: denies use
Illicit Drug Use: denies illicit drug use
 
Functional Ability
ADLs
Independent: dressing, eating. 
Ambulation: cane
IADLs
Independent: shopping, housework, finances. 
 
Review of Systems
Review of Systems:
Unremarkable except that noted in history of present illness
 
Exam & Diagnostic Data
Vital Signs and I&O
Vital Signs
 
 
Date Time Temp Pulse Resp B/P B/P Pulse O2 O2 Flow FiO2
 
     Mean Ox Delivery Rate 
 
05/01 0804    132/98     
 
05/01 0636 97.8 84 20 120/00  98 Room Air  
 
04/30 2117 97.7 106 19 104/68  95 Room Air  
 
04/30 1530       Room Air  
 
04/30 1507       Room Air  
 
04/30 1453 97.4 112 18 106/70  94   
 
 
 Intake & Output
 
 
 05/01 1600 05/01 0800 05/01 0000 04/30 1600 04/30 0800 04/30 0000
 
Intake Total  0  400 0 100
 
Output Total   0   
 
Balance  0 0 400 0 100
 
       
 
Intake, IV    0  
 
Intake, Oral  0  400 0 100
 
Number  0 0   
 
Bowel      
 
Movements      
 
Output, Urine   0   
 
Patient    176 lb 172 lb 
 
Weight      
 
Weight     Bed scale 
 
Measurement      
 
Method      
 
 
 
Physical Exam:
Unstageable necrotic wound on the posterior aspect of the left heel.  There is 
exposed Achilles tendon.  No probing or undermining identified.  No fluctuance 
or crepitus noted.  There is some erythema noted about the lesion.
 
Assessment/Plan
Assessment/Plan
Necrotic wound posterior left heel with suspicion for left heel osteomyelitis.  
We'll coordinate with the vascular service and scheduled patient for a left heel
debridement and bone biopsy
 
 
Consult Acknowledgment
- Thank you for your consult request.
 
Attending MD Review Statement
 
Attending Statement
Attending MD Statement: examined this patient

## 2018-04-27 NOTE — ADMISSION CERTIFICATION
Admission Certification
 
Certification Statement
- As attending physician, I certify that at the time of
- admission, based on clinical presentation, severity of
- symptoms, need for further diagnostic testing and
- therapeutic interventions, and risk of adverse outcomes
- without in-hospital treatment, in my clinical assessment,
- this patient requires an acute hospital stay for a minimum
- of two nights or longer. I have also considered psychsocial
- factors such as support system, advanced age, financial
- issues, cognitive issues, and failed out-patient treatments,
- past re-admission history, safety of patient, and lack of
- compliance as applicable.
Specific rationale supporting this admission is:
Left heel infection, inability to ambulate, missed dialysis

## 2018-04-27 NOTE — CONS- INFECT DISEASE
General Information and HPI
 
Consulting Request
Date of Consult: 04/27/18
Requested By:
Karey Buchanan MD
 
Reason for Consult:
Rule out osteomyelitis of the left heel
Source of Information: patient, old records
History of Present Illness:
This is a 52-year-old man with a history of end-stage renal disease secondary to
analgesic abuse, maintained on hemodialysis Mondays, Wednesdays and Fridays for 
18 years, most recently via a left thigh fistula, hypertension, status post CVA,
seizures, hyperparathyroidism, GI bleed and gout, with a nonhealing left heel 
ulcer for several months, hospitalized 5 weeks prior to admission with severe 
pain and increasing erythema of the left heel, status post debridement of 
necrotic soft tissue with no evidence of extension to bone, with a left anterior
tibial, posterior tibial, peroneal and dorsalis pedis angioplasty on that 
admission, and with MRI unable to be done secondary to anxiety, followed off 
antibiotics, readmitted 3 days later because of inability to care for himself at
home, and discharged to a rehab facility and, ultimately home, 4 days prior to 
admission, admitted on April 26 after having missed 2 sessions of dialysis.  On 
admission he was afebrile.  Laboratory data revealed a white blood cell count of
9000, BUN/creatinine 64 and 16, potassium 5.3, phosphorus 9.3.  X-rays of both 
feet revealed diffuse osteopenia with progression of the soft tissue ulcer 
posterior to the left calcaneus, with possible osteomyelitis.  He was followed 
off antibiotics and has remained afebrile.  At present he does complain of pain 
in both feet.
 
Allergies/Medications
Allergies:
Coded Allergies:
Iodinated Contrast- Oral and IV Dye (IODINATED CONTRAST MEDIA - IV DYE) (RASH 03
/19/18)
 
Home Med List:
Aspirin (Ecotrin*) 81 MG TABLET.   81 MG PO DAILY Heart
Atorvastatin Calcium 40 MG TABLET   40 MG PO 1700 Cholesterol
Omeprazole 20 MG CAPSULE.   1 CAP PO DAILY GI  (Reported)
 
 
Past History
 
Travel History
Traveled to [place] past 21 day No
 
Medical History
Neurological: migraine
EENT: NONE
Cardiovascular: hypertension
Respiratory: collapsed left lung with a persistent left pleural effusion.
Gastrointestinal: GERD, GI BLEED
Hepatic: NONE
Renal: ESRD on HD, DIALYSIS M-W-F ACCESS L LEG
Musculoskeletal: gout, osteoarthritis, secondary OSTEOPOROSIS GANGREEN L 2ND 
FINGER
Psychiatric: NONE
Endocrine: hyperparathyroidism
Blood Disorders: NONE
Cancer(s): NONE
GYN/Reproductive: NONE
Other Medical Hx:
In 2004 he had an infected abscess with MSSA bacteremia.
 Complete central venous occlusion in 2007.
History of MRSA: No
History of VRE: No
History of CDIFF: No
Isolation History: Standard
Influenza Vaccine: 09/01/17
 
Surgical History
Surgical History: GRAFT, LEFT THIGH s/p placement of numerous August catheters/also
dual-lumen Amor's left radial cephalic primary AVF on 03/28/2000 left upper 
arm AVG Tenckhoff catheter placement and subsequent removal status post 
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
 
Family History
Relations & Conditions If Any:
MOTHER (Heart Dz, Breast Ca).
FATHER (Diabetes).
Relation not specified for:
  FH: stroke
 
 
Psychosocial History
Where Do You Live? Home
Who Do You Live With? self
Services at Home: Nursing
Primary Language: English
Smoking Status: Former Smoker
ETOH Use: denies use
Illicit Drug Use: denies illicit drug use
 
Functional Ability
ADLs
Independent: dressing, eating. 
Ambulation: cane
IADLs
Independent: shopping, housework, finances. 
 
Review of Systems
 
Review of Systems
Constitutional:
Denies: chills. 
All Other Systems: Reviewed and Negative
 
Exam & Diagnostic Data
Last 24 Hrs of Vital Signs/I&O
 Vital Signs
 
 
Date Time Temp Pulse Resp B/P B/P Pulse O2 O2 Flow FiO2
 
     Mean Ox Delivery Rate 
 
04/26 2144 98.2 85 18 100/74  92 Room Air  
 
04/26 2111 97.8 89 18 108/71  94 Room Air  
 
04/26 1801 97.3 82 20 122/88  94 Room Air  
 
04/26 1616 97.2 80 18 133/89  95 Room Air  
 
 
 Intake & Output
 
 
 04/27 1600 04/27 0800 04/27 0000
 
Intake Total   
 
Output Total  0 
 
Balance  0 
 
    
 
Output, Urine  0 
 
Patient  173 lb 177 lb
 
Weight   
 
Weight  Bed scale Bed scale
 
Measurement   
 
Method   
 
 
 
 
Physical Exam
Other Physical Findings:
He is awake and alert in no acute distress.  He is afebrile.  Skin reveals no 
rash.  HEENT exam is negative.  Neck is supple with no adenopathy.  Lungs are 
clear.  Heart regular rhythm with no murmur.  Abdomen is soft, nontender with 
positive bowel sounds.  Back no CVA tenderness.  Extremities left heel necrotic 
ulcer on the posterior aspect, with no surrounding erythema; right heel necrotic
eschar on the lateral aspect, with no surrounding inflammation; erythematous 
discoloration and cool toes of the right foot; pulses 1+ and equal; status post 
amputation of the left second finger; left thigh AV fistula with no 
inflammation.  Neuro is without focality.
 
Last 24 Hours of Lab Results:
 Laboratory Tests
 
 
 04/27 04/26
 
 0905 1448
 
Chemistry  
 
  Sodium (137 - 145 mmol/L) 141 142
 
  Potassium (3.5 - 5.1 mmol/L) 5.6  H 5.3  H
 
  Chloride (98 - 107 mmol/L) 87  L 86  L
 
  Carbon Dioxide (22 - 30 mmol/L) 26 26
 
  Anion Gap (5 - 16) 28  H 30  H
 
  BUN (9 - 20 mg/dL) 74  H 64  H
 
  Creatinine (0.7 - 1.2 mg/dL) 17.3 *H 16.2 *H
 
  Estimated GFR (>60 ml/min) 3  L 3  L
 
  BUN/Creatinine Ratio (7 - 25 %) 4.3  L 4.0  L
 
  Glucose (65 - 99 mg/dL)  62  L
 
  Calcium (8.4 - 10.2 mg/dL)  6.9  L
 
  Phosphorus (2.5 - 4.5 mg/dL)  9.3  H
 
  Magnesium (1.6 - 2.3 mg/dL)  1.9
 
Hematology  
 
  CBC w Diff  NO MAN DIFF REQ
 
  WBC (4.8 - 10.8 /CUMM)  9.3
 
  RBC (4.70 - 6.10 /CUMM)  5.27
 
  Hgb (14.0 - 18.0 G/DL)  14.3
 
  Hct (42 - 52 %)  43.3
 
  MCV (80.0 - 94.0 FL)  82.2
 
  MCH (27.0 - 31.0 PG)  27.1
 
  MCHC (33.0 - 37.0 G/DL)  32.9  L
 
  RDW (11.5 - 14.5 %)  17.6  H
 
  Plt Count (130 - 400 /CUMM)  264
 
  MPV (7.4 - 10.4 FL)  9.4
 
  Gran % (42.2 - 75.2 %)  76.4  H
 
  Lymphocytes % (20.5 - 51.1 %)  12.7  L
 
  Monocytes % (1.7 - 9.3 %)  8.1
 
  Eosinophils % (0 - 5 %)  2.1
 
  Basophils % (0.0 - 2.0 %)  0.7
 
  Absolute Granulocytes (1.4 - 6.5 /CUMM)  7.1  H
 
  Absolute Lymphocytes (1.2 - 3.4 /CUMM)  1.2
 
  Absolute Monocytes (0.10 - 0.60 /CUMM)  0.8  H
 
  Absolute Eosinophils (0.0 - 0.7 /CUMM)  0.2
 
  Absolute Basophils (0.0 - 0.2 /CUMM)  0.1
 
 
 
Last 24 Hours of Arnold Results:
No cultures
 
 
Diagnostic Data
Recent Imaging Findings:
X-rays of both feet revealed diffuse osteopenia with progression of the soft 
tissue ulcer posterior to the left calcaneus, with possible osteomyelitis. 
 
 
Assessment/Plan
 
Assessment/Plan
Impression:
This is a 52-year-old man with a history of end-stage renal disease secondary to
analgesic abuse, maintained on hemodialysis Mondays, Wednesdays and Fridays for 
18 years, with a nonhealing left heel ulcer for several months, status post 
debridement of necrotic soft tissue with no evidence of extension to bone on a 
previous hospitalization 5 weeks prior to admission, with a left lower extremity
angioplasty at that time, with MRI unable to be done secondary to anxiety, 
admitted on April 26 for dialysis after having missed 2 sessions because of 
inability to ambulate, found to be afebrile with a normal white blood cell count
and with an x-ray of the left foot suggestive of osteomyelitis of the left 
calcaneus.  
 
His clinical picture is consistent with osteomyelitis of the left heel and 
suspect that he will require debridement and antibiotics, based on the bone 
culture.  As he is stable he can continue to be followed off antibiotics pending
further evaluation.  An MRI could again be considered if he can be sedated and 
may help in identifying the extent of his osteomyelitis.  He was revascularized 
last admission but he may benefit from vascular surgery reevaluation.
 
Suggestion:
1.  Would pursue MRI of the left foot, with sedation if necessary, if he is able
to tolerate
2.  Further management of his left foot per Podiatry based on above
3.  Consider Vascular surgery reevaluation
4.  Continue to follow off antibiotics pending above
 
Delphine Cobian MD will be covering over the weekend
 
 
 
Consult Acknowledgment
- Thank you for your consult request.

## 2018-04-27 NOTE — PN- HOUSESTAFF
Subjective
Follow-up For:
Left foot osteomyelitis, right foot ulcer with necrosis, end-stage renal disease
Complaints: no complaints
Subjective:
Patient seen and examined at bedside.  No overnight events.  Denies pain in his 
both legs, fever, chest pain, shortness of breath.
 
Review of Systems
Constitutional:
Reports: no symptoms, see HPI. 
 
Objective
Last 24 Hrs of Vital Signs/I&O
 Vital Signs
 
 
Date Time Temp Pulse Resp B/P B/P Pulse O2 O2 Flow FiO2
 
     Mean Ox Delivery Rate 
 
2144 98.2 85 18 100/74  92 Room Air  
 
 2111 97.8 89 18 108/71  94 Room Air  
 
 1801 97.3 82 20 122/88  94 Room Air  
 
 1616 97.2 80 18 133/89  95 Room Air  
 
 
 Intake & Output
 
 
  1600  0800  0000
 
Intake Total   
 
Output Total  0 
 
Balance  0 
 
    
 
Output, Urine  0 
 
Patient  173 lb 177 lb
 
Weight   
 
Weight  Bed scale Bed scale
 
Measurement   
 
Method   
 
 
 
 
Physical Exam
General Appearance: Alert, Oriented X3, Cooperative
Cardiovascular: Normal S1, Normal S2, No Murmurs
Lungs: Normal Air Movement
Abdomen: Soft, No Tenderness, No Hepatospenomegaly
Neurological: Normal Tone, Sensation Intact, Cranial Nerves 3-12 NL
Other Physical Findings:
Left foot osteomyelitis -In dressing, right foot-5 into 5 cm ulcer with necrosis
seen in the right lateral foot with no pus discharge.  Bilateral pulses felt.
Current Medications:
 Current Medications
 
 
  Sig/Jabari Start time  Last
 
Medication Dose Route Stop Time Status Admin
 
Acetaminophen 650 MG Q6P PRN  2315 AC 
 
  PO   
 
Heparin Sodium  5,000 UNIT Q8  2200  
 
(Porcine)  SC   0500
 
Hydrocodone Bitart/ 0 .STK-MED ONE  1703 DC 
 
Acetaminophen  PO   
 
Hydrocodone Bitart/ 1 TAB ONCE ONE  1645 DC 
 
Acetaminophen  PO  1646  1659
 
Omeprazole 20 MG DAILY AC  0700 AC 
 
  PO   0457
 
Omeprazole 20 MG DAILY AC  0700 AC 
 
  PO   
 
Oxycodone HCl 5 MG Q6H  2315 AC 
 
  PO   1237
 
Oxycodone/ 2 TAB Q6P PRN  2315 AC 
 
Acetaminophen  PO   
 
Patient Medication  1 ED ONE ONE  0915 DC 
 
Teaching  ED  0916  
 
 
 
 
Last 24 Hrs of Lab/Arnold Results
Last 24 Hrs of Labs/Mics:
 Laboratory Tests
 
18 0905:
Anion Gap 28  H, Estimated GFR 3  L, BUN/Creatinine Ratio 4.3  L
 
18 1448:
Anion Gap 30  H, Estimated GFR 3  L, BUN/Creatinine Ratio 4.0  L, Glucose 62  L,
Calcium 6.9  L, Phosphorus 9.3  H, Magnesium 1.9, CBC w Diff NO MAN DIFF REQ, 
RBC 5.27, MCV 82.2, MCH 27.1, MCHC 32.9  L, RDW 17.6  H, MPV 9.4, Gran % 76.4  H
, Lymphocytes % 12.7  L, Monocytes % 8.1, Eosinophils % 2.1, Basophils % 0.7, 
Absolute Granulocytes 7.1  H, Absolute Lymphocytes 1.2, Absolute Monocytes 0.8  
H, Absolute Eosinophils 0.2, Absolute Basophils 0.1
 
 
Assessment/Plan
Assessment:
Mr. Pérez is a 53yo M w/ PMH of HTN, history of collapsed left lung, GERD, ESRD 
on HD on MWF through left thigh catheter, HyperPTH, Hx of MSSA Bacteremia in 
. Patient was brought in by ambulance from home for bilateral foot pain and 
inability to walk. 
 
Assessment and plan
1.  Left foot osteomyelitis-patient is seen by podiatry who suggested MRI of the
left foot and further debridement depending upon the report.
2.  Right foot ulcer with necrosis.  Daily dressing and possible debridement in 
future.  Infectious disease consult placed.  Wound care consult placed.
3.  End-stage renal disease-patient is on hemodialysis on  and 
Friday.  He missed his past to dialysis due to weakness.  Nephrology consult 
placed who is planning for hemodialysis today.
4.  Weakness and gait instability-physical therapy consult placed.
5.  Continue rest of his home medication.
 
 
Code-full code
Diet-renal dialysis diet
DVT prophylaxis-heparin subcutaneous
 
Problem List:
 1. ESRD (end stage renal disease)
 
 2. Osteomyelitis of ankle or foot, left, acute
 
Pain Ratin
Pain Location:
LEFT FOOT
Pain Goal: Remain pain free
Pain Plan:
Oxycodone, acetaminophen
Tomorrow's Labs & Rationales:
CBC, BEP

## 2018-04-27 NOTE — CONS- NEPHROLOGY
General Information and HPI
 
Consulting Request
Date of Consult: 04/27/18
Requested By:
Karey Buchanan MD
 
Reason for Consult:
Management of ESRD
Source of Information: patient, old records
Exam Limitations: no limitations
History of Present Illness:
 
The patient is a 52-year-old man with dialysis-dependent end-stage renal disease
since 2000, currently being dialyzed at [facility] in [place].  He has had 
several recent [facility] admissions for peripheral vascular disease and 
specifically for a necrotic left heel wound which was debrided (Dr. Crocker) 
and for which he underwent a left anterior and posterior tibial artery, dorsalis
pedis, perineal artery angiogram with angioplasty (Dr. Christensen).  
Unfortunately despite being discharged to rehabilitation, once he was sent home 
from rehabilitation he was unable to ambulate and as a result has missed several
dialyses - his last treatment was a week ago today.  He is normally dialyzed on 
a Monday Wednesday Friday schedule.  He denies any shortness of breath, nausea 
or vomiting but now comes in because he is unable to walk due to his leg lesions
and weakness, and therefore unable to get to dialysis.
 
Past medical history is positive for ESRD on dialysis support since 2000, 
multiple access issues (currently being dialyzed via a left femoral loop AVG), 
hypertension, CVA, seizures, gout, osteoarthritis, collapsed left lung with 
persistent left pleural effusion, GI bleed, infected access with MSSA bacteremia
in 2004, complete central venous occlusion in 2007, peripheral vascular disease 
as noted but also including the need for amputation of a left index finger 
related to AVF induced ischemia.
 
Medications: See below
 
Allergies: Parenteral contrast
 
Family history: Father with diabetes mellitus, mother with heart disease and 
breast cancer, no family history for ESRD.
 
Social history: Nonsmoker, denies alcohol or drug abuse.  Lives at home with his
mother.
 
 
 
Allergies/Medications
Allergies:
Coded Allergies:
Iodinated Contrast- Oral and IV Dye (IODINATED CONTRAST MEDIA - IV DYE) (RASH 03
/19/18)
 
Home Med List:
Aspirin (Ecotrin*) 81 MG TABLET.   81 MG PO DAILY Heart
Atorvastatin Calcium 40 MG TABLET   40 MG PO 1700 Cholesterol
Omeprazole 20 MG CAPSULE.   1 CAP PO DAILY GI  (Reported)
 
 
Review of Systems
Review of Systems:
 
Gen.: Appetite good, no weight loss or weight gain
Skin: No rash or jaundice
HEENT: No visual or hearing disturbances, no discharge
Cardiopulmonary: No shortness of breath, cough, chest pain, orthopnea
GI: No nausea, vomiting, abdominal pain, diarrhea
: No dysuria, hematuria or other symptoms referable to the urinary tract
Musculoskeletal: See HPI 
Neuro: +weakness, no altered mental status, paresthesias
 
 
 
Past History
 
Travel History
Traveled to [place] past 21 day No
 
Medical History
Neurological: migraine
EENT: NONE
Cardiovascular: hypertension
Respiratory: collapsed left lung with a persistent left pleural effusion.
Gastrointestinal: GERD, GI BLEED
Hepatic: NONE
Renal: ESRD on HD, DIALYSIS M-W-F ACCESS L LEG
Musculoskeletal: gout, osteoarthritis, secondary OSTEOPOROSIS GANGREEN L 2ND 
FINGER
Psychiatric: NONE
Endocrine: hyperparathyroidism
Blood Disorders: NONE
Cancer(s): NONE
GYN/Reproductive: NONE
Other Medical Hx:
In 2004 he had an infected abscess with MSSA bacteremia.
 Complete central venous occlusion in 2007.
 
Surgical History
Surgical History: GRAFT, LEFT THIGH s/p placement of numerous August catheters/also
dual-lumen Amor's left radial cephalic primary AVF on 03/28/2000 left upper 
arm AVG Tenckhoff catheter placement and subsequent removal status post 
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
 
Family History
Relations & Conditions If Any:
MOTHER (Heart Dz, Breast Ca).
FATHER (Diabetes).
Relation not specified for:
  FH: stroke
 
 
Psychosocial History
Where Do You Live? Home
Who Do You Live With? self
Services at Home: Nursing
Primary Language: English
Smoking Status: Former Smoker
ETOH Use: denies use
Illicit Drug Use: denies illicit drug use
 
Functional Ability
ADLs
Independent: dressing, eating. 
Ambulation: cane
IADLs
Independent: shopping, housework, finances. 
 
Exam & Diagnostic Data
Vital Signs and I&O
Vital Signs
 
 
Date Time Temp Pulse Resp B/P B/P Pulse O2 O2 Flow FiO2
 
     Mean Ox Delivery Rate 
 
04/26 2144 98.2 85 18 100/74  92 Room Air  
 
04/26 2111 97.8 89 18 108/71  94 Room Air  
 
04/26 1801 97.3 82 20 122/88  94 Room Air  
 
04/26 1616 97.2 80 18 133/89  95 Room Air  
 
04/26 1206  90 18 128/89  94 Room Air  
 
 
 Intake & Output
 
 
 04/27 1600 04/27 0400 04/26 1600 04/26 0400 04/25 1600 04/25 0400
 
Intake Total   0   
 
Output Total 0     
 
Balance 0  0   
 
       
 
Intake, Oral   0   
 
Output, Urine 0     
 
Patient 173 lb 177 lb 185 lb   
 
Weight      
 
Weight Bed scale Bed scale Reported by Patient   
 
Measurement      
 
Method      
 
 
 
Physical Exam:
 
General: Well-developed white male in NAD
Skin: No rash or jaundice
HEENT: Conjunctivae pink, sclerae anicteric, mucous membranes moist
Neck: Without masses or thyromegaly, no supraclavicular or cervical adenopathy
Chest: Clear to P&A
Heart: Regular rate and rhythm without S3 or rub
Abdomen: Soft and nontender without palpable masses or organomegaly
Extremities: Without cyanosis or edema; the left foot dressing is intact; left 
femoral loop AVG patent
Neuro: Awake and alert, no asterixis or myoclonus
 
 
 
Assessment/Plan
 
Assessment/Recommendations
Assessment:
 
52-year-old man with multiple comorbidities including dialysis-dependent end-
stage renal disease and peripheral vascular disease with a necrotic left heel.  
He has been unable to get to his regularly scheduled outpatient dialysis 
sessions and has  therefore not been dialyzed for 1 week.  Fortunately, he is 
not an overt CHF and his electrolytes are acceptable.
 
 
Recommendations:
 
1.  We will arrange for dialysis here today with ultrafiltration to his target 
weight as tolerated over 3.5 hours
 
2.  Continue outpatient medications
 
3.  Diet: 2 g sodium, 2 g potassium, 80 g protein, 1200 mL fluid limit per day
 
4.  Physical therapy
 
5.  Podiatry following
 
Thank you.  We will follow with you while he is in the hospital.

## 2018-04-27 NOTE — PN- ATT ADDEND
Attending Addendum
Attending Brief Note
Patient seen and examined, he does complain of pain in his left foot.  He also 
has a necrotic-looking wound in the right foot.  Patient was recently discharged
from the rehabilitation and came to the hospital because he could not ambulate.
 
Vital Signs
 
 
Date Time Temp Pulse Resp B/P B/P Pulse O2 O2 Flow FiO2
 
     Mean Ox Delivery Rate 
 
04/26 2144 98.2 85 18 100/74  92 Room Air  
 
04/26 2111 97.8 89 18 108/71  94 Room Air  
 
04/26 1801 97.3 82 20 122/88  94 Room Air  
 
04/26 1616 97.2 80 18 133/89  95 Room Air  
 
04/26 1206  90 18 128/89  94 Room Air  
 
 
on exam; 
aox,3 nad. 
cv; s1, s2, rrr
resp; clear
abd; soft, nt, bs+
ext; no edema
skin; dressing on left foot, + necrotic looking wound on right foot. 
+ AV fistula on left thigh. 
 
 Laboratory Tests
 
 
 04/27 04/26
 
 0905 1448
 
Chemistry  
 
  Sodium (137 - 145 mmol/L) 141 142
 
  Potassium (3.5 - 5.1 mmol/L) 5.6  H 5.3  H
 
  Chloride (98 - 107 mmol/L) 87  L 86  L
 
  Carbon Dioxide (22 - 30 mmol/L) 26 26
 
  Anion Gap (5 - 16) 28  H 30  H
 
  BUN (9 - 20 mg/dL) 74  H 64  H
 
  Creatinine (0.7 - 1.2 mg/dL) 17.3 *H 16.2 *H
 
  Estimated GFR (>60 ml/min) 3  L 3  L
 
  BUN/Creatinine Ratio (7 - 25 %) 4.3  L 4.0  L
 
  Glucose (65 - 99 mg/dL)  62  L
 
  Calcium (8.4 - 10.2 mg/dL)  6.9  L
 
  Phosphorus (2.5 - 4.5 mg/dL)  9.3  H
 
  Magnesium (1.6 - 2.3 mg/dL)  1.9
 
Hematology  
 
  CBC w Diff  NO MAN DIFF REQ
 
  WBC (4.8 - 10.8 /CUMM)  9.3
 
  RBC (4.70 - 6.10 /CUMM)  5.27
 
  Hgb (14.0 - 18.0 G/DL)  14.3
 
  Hct (42 - 52 %)  43.3
 
  MCV (80.0 - 94.0 FL)  82.2
 
  MCH (27.0 - 31.0 PG)  27.1
 
  MCHC (33.0 - 37.0 G/DL)  32.9  L
 
  RDW (11.5 - 14.5 %)  17.6  H
 
  Plt Count (130 - 400 /CUMM)  264
 
  MPV (7.4 - 10.4 FL)  9.4
 
  Gran % (42.2 - 75.2 %)  76.4  H
 
  Lymphocytes % (20.5 - 51.1 %)  12.7  L
 
  Monocytes % (1.7 - 9.3 %)  8.1
 
  Eosinophils % (0 - 5 %)  2.1
 
  Basophils % (0.0 - 2.0 %)  0.7
 
  Absolute Granulocytes (1.4 - 6.5 /CUMM)  7.1  H
 
  Absolute Lymphocytes (1.2 - 3.4 /CUMM)  1.2
 
  Absolute Monocytes (0.10 - 0.60 /CUMM)  0.8  H
 
  Absolute Eosinophils (0.0 - 0.7 /CUMM)  0.2
 
  Absolute Basophils (0.0 - 0.2 /CUMM)  0.1
 
 
A/P; 53 y/o M with pmh sig for HTN, history of collapsed left lung, GERD, ESRD 
on HD on MWF through left thigh catheter, HyperPTH, Hx of MSSA Bacteremia in 
2004 recent admission to [facility] with osteophyte his difficulty 
ambulating was sent to rehabilitation and went home recently from the 
rehabilitation.  Now admitted with difficulty ambulating, wounds on bilateral 
feet with a question of acute and chronic osteitis.  Next line patient seen by 
Dr. Crocker.  Plan to do left foot MRI.  We'll get wound care consult for the 
right foot necrotic-looking wound.  Hemodialysis per nephrology.  Patient has 
been washed off of antibiotics.  We'll follow-up on the results of MRI and with 
Dr. Crocker.  Continue current pain management.  Patient on GI and 
pharmacologic DVT px.

## 2018-04-28 VITALS — SYSTOLIC BLOOD PRESSURE: 102 MMHG | DIASTOLIC BLOOD PRESSURE: 74 MMHG

## 2018-04-28 VITALS — DIASTOLIC BLOOD PRESSURE: 79 MMHG | SYSTOLIC BLOOD PRESSURE: 104 MMHG

## 2018-04-28 LAB
ABSOLUTE GRANULOCYTE CT: 5.8 /CUMM (ref 1.4–6.5)
BASOPHILS # BLD: 0 /CUMM (ref 0–0.2)
BASOPHILS NFR BLD: 0.5 % (ref 0–2)
EOSINOPHIL # BLD: 0.1 /CUMM (ref 0–0.7)
EOSINOPHIL NFR BLD: 1.7 % (ref 0–5)
ERYTHROCYTE [DISTWIDTH] IN BLOOD BY AUTOMATED COUNT: 18 % (ref 11.5–14.5)
GRANULOCYTES NFR BLD: 72 % (ref 42.2–75.2)
HCT VFR BLD CALC: 39.7 % (ref 42–52)
LYMPHOCYTES # BLD: 1.1 /CUMM (ref 1.2–3.4)
MCH RBC QN AUTO: 26.5 PG (ref 27–31)
MCHC RBC AUTO-ENTMCNC: 31.5 G/DL (ref 33–37)
MCV RBC AUTO: 84 FL (ref 80–94)
MONOCYTES # BLD: 1 /CUMM (ref 0.1–0.6)
PLATELET # BLD: 238 /CUMM (ref 130–400)
PMV BLD AUTO: 9.8 FL (ref 7.4–10.4)
RED BLOOD CELL CT: 4.72 /CUMM (ref 4.7–6.1)
WBC # BLD AUTO: 8.1 /CUMM (ref 4.8–10.8)

## 2018-04-28 NOTE — PN- ATT ADDEND
Attending Addendum
Attending Brief Note
Patient seen and examined.  No issues overnight reported by nursing staff.  
Complaining of left heel pain.  He had refused pain medications earlier this 
morning but is currently requesting pain medications.  He is afebrile.  He is 
hemodynamically stable.
 
On examining the patient today had noted to have foul-smelling ulceration on the
second finger of the left hand.  Patient reports that he developed this 
ulceration while at a skilled nursing facility.  Reports that it was dressed 
while at the facility.  There is an ulceration extending from the second 
metacarpal phalangeal joint to the distal interphalangeal joint on the left 
hand.  There is no discoloration of the fingers.  There is no surrounding 
erythema.  He is status post amputation of the fourth finger on that hand.  She 
reports that this was following an infection in the past.
 
HEENT: Anicteric
Heart: S1-S2 regular
Lungs: Clear to auscultation bilaterally
Abdomen: Soft, nontender with normal bowel sounds
Extremities: Upper extremity examination as documented above.  He has intact 
dressing over the left heel.  The toes on that foot are swollen and 
erythematous.  The foot is very tender to touch.
 
 
Problems:
1.  Presumed left heel osteomyelitis.
2.  Necrotic foul-smelling ulceration on the second finger.
3.  End-stage renal disease on hemodialysis.
4.  Peripheral vascular disease.
 
Plan:
-Patient requires MRI of the left heel and of the left 2nd finger.  He is 
claustrophobic.  It is unable to undergo MRI here under sedation consideration 
should be given to sending the patient to a facility with an open MRI.
-Awaiting evaluation by the podiatry service.  No recommendations noted in the 
notes done yesterday.
-Awaiting evaluation from the vascular surgery service regarding possible need 
for repeat revascularization.
-Continue hemodialysis per his schedule.
-Monitor serum chemistry for the protocol of the nephrology service.  Repeat CBC
if there is a change in his clinical status.  He is currently afebrile with no 
leukocytosis and stable hemoglobin levels.

## 2018-04-28 NOTE — CONS- VASCULAR SURGERY
General Information and HPI
 
Consulting Request
Date of Consult: 04/28/18
Requested By:
dr. Kumar
52-year-old man returns to the Hospital from rehab with bilateral lower 
extremity weakness and nonhealing ulcers with pain both legs. H has a history of
end-stage renal disease on hemodialysis Mondays, Wednesdays and Fridays for 18 
years. He has a nonhealing left heel ulcer for several months, status post 
debridement of necrotic soft tissue. He had a left lower extremity angioplasty 
at that time, with MRI unable to be done secondary to anxiety He was readmitted 
on April 26 for dialysis after having missed 2 sessions because of inability to 
ambulate, found to be afebrile with a normal white blood cell count. x-ray of 
the left foot is suggestive of osteomyelitis of the left calcaneus.  
 
 
 
 
 
1.  Further management of his left foot per Podiatry based on above; 
bone bx off abx; if febrile BC x2 and please call.
3.  Consider Vascular surgery reevaluation
4.  Continue to follow off antibiotics pending Karey Solomon MD
 
Reason for Consult:
leg pain
Source of Information: patient, old records
History of Present Illness:
Patient presented to the ER from rehabilitation with bilateral lower extremity 
weakness and pain
 
Allergies/Medications
Allergies:
Coded Allergies:
Iodinated Contrast- Oral and IV Dye (IODINATED CONTRAST MEDIA - IV DYE) (RASH 03
/19/18)
 
Home Med List:
Aspirin (Ecotrin*) 81 MG TABLET.   81 MG PO DAILY Heart
Atorvastatin Calcium 40 MG TABLET   40 MG PO 1700 Cholesterol
Omeprazole 20 MG CAPSULE.   1 CAP PO DAILY GI  (Reported)
 
 
Past History
 
Medical History
Neurological: migraine
EENT: NONE
Cardiovascular: hypertension
Respiratory: collapsed left lung with a persistent left pleural effusion.
Gastrointestinal: GERD, GI BLEED
Hepatic: NONE
Renal: ESRD on HD, DIALYSIS M-W-F ACCESS L LEG
Musculoskeletal: gout, osteoarthritis, secondary OSTEOPOROSIS GANGREEN L 2ND 
FINGER
Psychiatric: NONE
Endocrine: hyperparathyroidism
Blood Disorders: NONE
Cancer(s): NONE
GYN/Reproductive: NONE
Other Medical Hx:
In 2004 he had an infected abscess with MSSA bacteremia.
 Complete central venous occlusion in 2007.
 
Surgical History
Pertinent Surgical History: GRAFT, LEFT THIGH s/p placement of numerous August 
catheters/also dual-lumen Amor's left radial cephalic primary AVF on 03/28/
2000 left upper arm AVG Tenckhoff catheter placement and subsequent removal 
status post amputation of the left index finger for gangrene
 
Family History
Relations & Conditions If Any:
MOTHER (Heart Dz, Breast Ca).
FATHER (Diabetes).
Relation not specified for:
  FH: stroke
 
 
Psychosocial History
Where Do You Live? Home
Who Do You Live With? self
Services at Home: Nursing
Primary Language: English
Smoking Status: Former Smoker
ETOH Use: denies use
Illicit Drug Use: denies illicit drug use
 
Functional Ability
ADLs
Independent: dressing, eating. 
Ambulation: cane
IADLs
Independent: shopping, housework, finances. 
 
Review of Systems
Review of Systems:
Constitutional: No chills no fever pos weakness
HEENT: No blurred vision visual changes no throat pain nasal pain
CV: denies chest pain edema orthopnea syncopal episode no peripheral edema
Respiratory: No cough hemoptysis shortness of breath or wheeze
GI: No abdominal pain bloating constipation or diarrhea or bloody stools no 
vomiting
Musculoskeletal: No neck pain back pain or joint swelling
Skin: No recent acute changes in skin
Neurological: No dizziness, pos weakness  no acute mental status changes
 
 
Exam & Diagnostic Data
Vital Signs and I&O
Vital Signs
 
 
Date Time Temp Pulse Resp B/P B/P Pulse O2 O2 Flow FiO2
 
     Mean Ox Delivery Rate 
 
04/28 0612 97.7 91 20 104/79  95   
 
04/27 2147 98.2 103 19 92/68  92 Room Air  
 
 
 Intake & Output
 
 
 04/28 1600 04/28 0800 04/28 0000 04/27 1600 04/27 0800 04/27 0000
 
Intake Total    0  
 
Output Total     0 
 
Balance    0 0 
 
       
 
Intake, IV    0  
 
Number    0  
 
Bowel      
 
Movements      
 
Output, Urine     0 
 
Patient  168 lb   173 lb 177 lb
 
Weight      
 
Weight  Bed scale   Bed scale Bed scale
 
Measurement      
 
Method      
 
 
On physical examination patient is alert and oriented 3 comfortable on current 
pain regime.
Chest is clear to auscultation symmetric without rales rhonchi or wheeze
Heart is regular rate rhythm without murmurs rubs gallops
Abdomen is soft without distention nontender active bowel sounds
Examination of his lower extremities shows moderate ischemic skin changes of the
left foot with a large necrotic ulcer along the posterior aspect of the 
calcaneus with surrounding erythema and scabbing.  This wound is in need of 
debridement and full wound care.  There is a dorsalis pedis and posterior 
tibialis pulse dopplerable not palpated.  The right foot has early extremities 
ischemic changes with erythema there is some early breakdown along the posterior
part of the heel.  This too has a dorsalis pedis and posterior tibialis pulse 
that is dopplerable but not palpated.  His calves are soft bilaterally and I can
palpate a popliteal pulse he has severe atrophy of his calves bilaterally
 
Admission Lab Results
I reviewed the following labs:
 Laboratory Tests
 
 
 04/28
 
 0750
 
Chemistry 
 
  Sodium (137 - 145 mmol/L) 143
 
  Potassium (3.5 - 5.1 mmol/L) 4.4
 
  Chloride (98 - 107 mmol/L) 93  L
 
  Carbon Dioxide (22 - 30 mmol/L) 30
 
  Anion Gap (5 - 16) 19  H
 
  BUN (9 - 20 mg/dL) 27  H
 
  Creatinine (0.7 - 1.2 mg/dL) 9.2 *H
 
  Estimated GFR (>60 ml/min) 6  L
 
  BUN/Creatinine Ratio (7 - 25 %) 2.9  L
 
Hematology 
 
  CBC w Diff NO MAN DIFF REQ
 
  WBC (4.8 - 10.8 /CUMM) 8.1
 
  RBC (4.70 - 6.10 /CUMM) 4.72
 
  Hgb (14.0 - 18.0 G/DL) 12.5  L
 
  Hct (42 - 52 %) 39.7  L
 
  MCV (80.0 - 94.0 FL) 84.0
 
  MCH (27.0 - 31.0 PG) 26.5  L
 
  MCHC (33.0 - 37.0 G/DL) 31.5  L
 
  RDW (11.5 - 14.5 %) 18.0  H
 
  Plt Count (130 - 400 /CUMM) 238
 
  MPV (7.4 - 10.4 FL) 9.8
 
  Gran % (42.2 - 75.2 %) 72.0
 
  Lymphocytes % (20.5 - 51.1 %) 13.0  L
 
  Monocytes % (1.7 - 9.3 %) 12.8  H
 
  Eosinophils % (0 - 5 %) 1.7
 
  Basophils % (0.0 - 2.0 %) 0.5
 
  Absolute Granulocytes (1.4 - 6.5 /CUMM) 5.8
 
  Absolute Lymphocytes (1.2 - 3.4 /CUMM) 1.1  L
 
  Absolute Monocytes (0.10 - 0.60 /CUMM) 1.0  H
 
  Absolute Eosinophils (0.0 - 0.7 /CUMM) 0.1
 
  Absolute Basophils (0.0 - 0.2 /CUMM) 0
 
 
 
 
Assessment/Plan
Assessment/Plan
Assessment and plan 
He will need local wound care and debridements for both of his heels bilaterally
 
He does have warm feet and dopplerable pulses bilaterally,  arterial ultrasounds
have been ordered 
X-ray of the left foot showed questionable osteo
 
 
 
Consult Acknowledgment
- Thank you for your consult request.

## 2018-04-28 NOTE — PN- INFECT DX
Subjective
Subjective:
No fever. Minimal foot pain. 
 
Review of Systems
Comments:
12 points reviewed a noted, otherwise negative.
 
Objective
Last 24 Hrs of Vital Signs/I&O
 Vital Signs
 
 
Date Time Temp Pulse Resp B/P B/P Pulse O2 O2 Flow FiO2
 
     Mean Ox Delivery Rate 
 
04/28 0612 97.7 91 20 104/79  95   
 
04/27 2147 98.2 103 19 92/68  92 Room Air  
 
 
 Intake & Output
 
 
 04/28 1600 04/28 0800 04/28 0000
 
Intake Total   
 
Output Total   
 
Balance   
 
    
 
Patient  168 lb 
 
Weight   
 
Weight  Bed scale 
 
Measurement   
 
Method   
 
 
 
 
Physical Exam
Other Physical Findings:
He is awake and alert in no acute distress.  He is afebrile.  Skin reveals no 
rash.  HEENT exam is negative.  Neck is supple with no adenopathy.  Lungs are 
clear.  Heart regular rhythm with no murmur.  Abdomen is soft, nontender with 
positive bowel sounds.  Back no CVA tenderness.  Extremities left heel necrotic 
ulcer on the posterior aspect, with no surrounding erythema; right heel necrotic
eschar on the lateral aspect, with no surrounding inflammation; erythematous 
discoloration and cool toes of the right foot; pulses 1+ and equal; status post 
amputation of the left second finger; left thigh AV fistula with no 
inflammation.  Neuro is without focality.
 
Results
Last 24 Hours of Lab Results:
 Laboratory Tests
 
 
 04/28
 
 0750
 
Chemistry 
 
  Sodium (137 - 145 mmol/L) 143
 
  Potassium (3.5 - 5.1 mmol/L) 4.4
 
  Chloride (98 - 107 mmol/L) 93  L
 
  Carbon Dioxide (22 - 30 mmol/L) 30
 
  Anion Gap (5 - 16) 19  H
 
  BUN (9 - 20 mg/dL) 27  H
 
  Creatinine (0.7 - 1.2 mg/dL) 9.2 *H
 
  Estimated GFR (>60 ml/min) 6  L
 
  BUN/Creatinine Ratio (7 - 25 %) 2.9  L
 
Hematology 
 
  CBC w Diff NO MAN DIFF REQ
 
  WBC (4.8 - 10.8 /CUMM) 8.1
 
  RBC (4.70 - 6.10 /CUMM) 4.72
 
  Hgb (14.0 - 18.0 G/DL) 12.5  L
 
  Hct (42 - 52 %) 39.7  L
 
  MCV (80.0 - 94.0 FL) 84.0
 
  MCH (27.0 - 31.0 PG) 26.5  L
 
  MCHC (33.0 - 37.0 G/DL) 31.5  L
 
  RDW (11.5 - 14.5 %) 18.0  H
 
  Plt Count (130 - 400 /CUMM) 238
 
  MPV (7.4 - 10.4 FL) 9.8
 
  Gran % (42.2 - 75.2 %) 72.0
 
  Lymphocytes % (20.5 - 51.1 %) 13.0  L
 
  Monocytes % (1.7 - 9.3 %) 12.8  H
 
  Eosinophils % (0 - 5 %) 1.7
 
  Basophils % (0.0 - 2.0 %) 0.5
 
  Absolute Granulocytes (1.4 - 6.5 /CUMM) 5.8
 
  Absolute Lymphocytes (1.2 - 3.4 /CUMM) 1.1  L
 
  Absolute Monocytes (0.10 - 0.60 /CUMM) 1.0  H
 
  Absolute Eosinophils (0.0 - 0.7 /CUMM) 0.1
 
  Absolute Basophils (0.0 - 0.2 /CUMM) 0
 
 
 
Last 24 Hours of Arnold Results:
reviewed
Recent Imaging Studies:
X ray IMPRESSION:
Diffuse osteopenia and extensive vascular calcifications bilaterally.
Progression of the soft tissue ulcer posterior to the left calcaneus.
Suspected osteomyelitis at the base of the enthesophyte. This could be
confirmed with MRI if indicated.
 
DICTATED BY: Lamine Taylor MD 
DATE/TIME DICTATED:04/26/18 / 1434
:RAD.TONY 
DATE/TIME TRANSCRIBED:04/26/18 / 1434
 
 
Assessment/Plan ID
Impression:
 
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
abuse, maintained on hemodialysis Mondays, Wednesdays and Fridays for 18 years, 
with a nonhealing left heel ulcer for several months, status post debridement of
necrotic soft tissue with no evidence of extension to bone on a previous 
hospitalization 5 weeks prior to admission, with a left lower extremity 
angioplasty at that time, with MRI unable to be done secondary to anxiety, 
admitted on April 26 for dialysis after having missed 2 sessions because of 
inability to ambulate, found to be afebrile with a normal white blood cell count
and with an x-ray of the left foot suggestive of osteomyelitis of the left 
calcaneus.  
 
His clinical picture is consistent with osteomyelitis of the left heel and 
suspect that he will require debridement and antibiotics, based on the bone 
culture.  As he is stable he can continue to be followed off antibiotics pending
further evaluation.  Refusing MRI even w/ sedation.
 
Right foot ulcer with necrosis.  Daily dressing and possible debridement in 
future.
 
Suggestion:
 
1.  Further management of his left foot per Podiatry based on above; 
bone bx off abx; if febrile BC x2.
3.  Consider Vascular surgery reevaluation
4.  Continue to follow off antibiotics pending above.

## 2018-04-28 NOTE — PN- HOUSESTAFF
Subjective
Follow-up For:
End-stage renal disease, left foot osteomyelitis
Subjective:
No overnight events.  The patient refused MRI last night because he is 
claustrophobic.  This morning, he is complaining of a left finger wound and pain
on his feet that prevents him from walking.  He has no chest pain, shortness of 
breath, abdominal pain, nausea, vomiting, or diarrhea.  He is amenable to going 
to rehab but would like to [facility].
 
Review of Systems
Constitutional:
Reports: no symptoms. 
EENTM:
Reports: no symptoms. 
Cardiovascular:
Reports: no symptoms. 
Respiratory:
Reports: no symptoms. 
Gastrointestinal:
Reports: no symptoms. 
Genitourinary:
Reports: no symptoms. 
Musculoskeletal:
Reports: no symptoms. 
Skin:
Reports: see HPI. 
Neurological/Psychological:
Reports: no symptoms. 
Hematologic/Endocrine:
Reports: no symptoms. 
Immunologic/Allergic:
Reports: no symptoms. 
 
Objective
Last 24 Hrs of Vital Signs/I&O
 Vital Signs
 
 
Date Time Temp Pulse Resp B/P B/P Pulse O2 O2 Flow FiO2
 
     Mean Ox Delivery Rate 
 
 0612 97.7 91 20 104/79  95   
 
 2147 98.2 103 19 92/68  92 Room Air  
 
 
 Intake & Output
 
 
  1600  0800  0000
 
Intake Total   
 
Output Total   
 
Balance   
 
    
 
Patient  76.289 kg 
 
Weight   
 
Weight  Bed scale 
 
Measurement   
 
Method   
 
 
 
 
Physical Exam
General Appearance: Alert, Oriented X3, Cooperative, No Acute Distress
Cardiovascular: Regular Rate, Normal S1, Normal S2
Lungs: Clear to Auscultation, Normal Air Movement
Abdomen: Normal Bowel Sounds, Soft, No Tenderness
Extremities: Left finger wound on medial aspect, yellow, no erythmea. Left foot 
wrapped, right foot has wound on lateral aspect.
Current Medications:
 Current Medications
 
 
  Sig/Jabari Start time  Last
 
Medication Dose Route Stop Time Status Admin
 
Acetaminophen 650 MG Q6P PRN  2315 AC 
 
  PO   
 
Heparin Sodium  5,000 UNIT Q8  2200 AC 
 
(Porcine)  SC   2204
 
Omeprazole 20 MG DAILY AC  0700 AC 
 
  PO   0536
 
Omeprazole 20 MG DAILY AC  0700 AC 
 
  PO   
 
Oxycodone HCl 5 MG Q6H  2315 AC 
 
  PO   0822
 
Oxycodone/ 2 TAB Q6P PRN  2315 AC 
 
Acetaminophen  PO   
 
Patient Medication  1 ED ONE ONE  0915 DC 
 
Teaching  ED 0916  
 
 
 
 
Last 24 Hrs of Lab/Arnold Results
Last 24 Hrs of Labs/Mics:
 Laboratory Tests
 
18 0905:
Anion Gap 28  H, Estimated GFR 3  L, BUN/Creatinine Ratio 4.3  L
 
 
Assessment/Plan
Assessment:
Mr. Pérez is a 53yo M w/ PMH of HTN, history of collapsed left lung, GERD, ESRD 
on HD on MWF through left thigh catheter, HyperPTH, Hx of MSSA Bacteremia in 
. Patient was brought in by ambulance from home for bilateral foot pain and 
inability to walk. 
 
Assessment and plan
1.  Left foot osteomyelitis-patient is seen by podiatry who suggested MRI of the
left foot and further debridement depending upon the report.  However, the 
patient refused MRI due to claustrophobia.
2.  Right foot ulcer with necrosis.  Daily dressing and possible debridement in 
future.  Infectious disease consult placed.  Wound care consult placed.  
Attention to left finger ulceration as well.  Appreciate vascular surgery 
recommendations
3.  End-stage renal disease-patient is on hemodialysis on  and 
Friday.  He missed his past to dialysis due to weakness.  Nephrology consult 
placed.
4.  Weakness and gait instability-physical therapy consult placed.  He will 
likely need short-term rehab.
5.  Continue rest of his home medication.
 
Code-full code
Diet-renal dialysis diet
DVT prophylaxis-heparin subcutaneous
Problem List:
 1. ESRD (end stage renal disease) on dialysis
 
Pain Ratin
Pain Location:
finger
Pain Goal: Remain pain free
Pain Plan:
see a/p
Tomorrow's Labs & Rationales:
cbc, bep

## 2018-04-29 VITALS — SYSTOLIC BLOOD PRESSURE: 108 MMHG | DIASTOLIC BLOOD PRESSURE: 68 MMHG

## 2018-04-29 VITALS — SYSTOLIC BLOOD PRESSURE: 112 MMHG | DIASTOLIC BLOOD PRESSURE: 62 MMHG

## 2018-04-29 VITALS — SYSTOLIC BLOOD PRESSURE: 104 MMHG | DIASTOLIC BLOOD PRESSURE: 60 MMHG

## 2018-04-29 LAB
ABSOLUTE GRANULOCYTE CT: 5.4 /CUMM (ref 1.4–6.5)
BASOPHILS # BLD: 0 /CUMM (ref 0–0.2)
BASOPHILS NFR BLD: 0.5 % (ref 0–2)
EOSINOPHIL # BLD: 0.2 /CUMM (ref 0–0.7)
EOSINOPHIL NFR BLD: 2.7 % (ref 0–5)
ERYTHROCYTE [DISTWIDTH] IN BLOOD BY AUTOMATED COUNT: 17.6 % (ref 11.5–14.5)
GRANULOCYTES NFR BLD: 69.7 % (ref 42.2–75.2)
HCT VFR BLD CALC: 37.9 % (ref 42–52)
LYMPHOCYTES # BLD: 1.2 /CUMM (ref 1.2–3.4)
MCH RBC QN AUTO: 26.6 PG (ref 27–31)
MCHC RBC AUTO-ENTMCNC: 31.9 G/DL (ref 33–37)
MCV RBC AUTO: 83.7 FL (ref 80–94)
MONOCYTES # BLD: 0.9 /CUMM (ref 0.1–0.6)
PLATELET # BLD: 257 /CUMM (ref 130–400)
PMV BLD AUTO: 9.8 FL (ref 7.4–10.4)
RED BLOOD CELL CT: 4.53 /CUMM (ref 4.7–6.1)
WBC # BLD AUTO: 7.7 /CUMM (ref 4.8–10.8)

## 2018-04-29 NOTE — CONS- VASCULAR SURGERY
General Information and HPI
 
Consulting Request
Date of Consult: 04/29/18
Requested By:
Dewayne BEDOLLA,Karey
 
Reason for Consult:
Bilateral leg/foot ulcers and a left finger ulcer
Source of Information: patient, old records
History of Present Illness:
51 yo male with multiple medical problems including ESRD/HD and recent foot/leg 
wounds treated with angio/tibial PTA 3/18 by Dr. Christensen. Pt. did not f/u 
post procedure and has had HD via a L. thigh AVG. He has also developed a right 
heel ulcer and a L. finger wound. Denies rest pain.
 
Allergies/Medications
Allergies:
Coded Allergies:
Iodinated Contrast- Oral and IV Dye (IODINATED CONTRAST MEDIA - IV DYE) (RASH 03
/19/18)
 
Home Med List:
Aspirin (Ecotrin*) 81 MG TABLET.DR   81 MG PO DAILY Heart
Atorvastatin Calcium 40 MG TABLET   40 MG PO 1700 Cholesterol
Omeprazole 20 MG CAPSULE.   1 CAP PO DAILY GI  (Reported)
 
Current Medications:
 Current Medications
 
 
  Sig/Jabari Start time  Last
 
Medication Dose Route Stop Time Status Admin
 
Acetaminophen 650 MG Q6P PRN 04/26 2315 AC 
 
  PO   
 
Docusate Sodium 100 MG DAILY AS NEEDED PRN 04/28 1000 AC 
 
  PO   
 
Heparin Sodium  5,000 UNIT Q8 04/26 2200 AC 04/29
 
(Porcine)  SC   0703
 
Omeprazole 20 MG DAILY AC 04/27 0700 AC 04/29
 
  PO   0704
 
Omeprazole 20 MG DAILY AC 04/27 0700 AC 
 
  PO   
 
Oxycodone HCl 5 MG Q6H 04/26 2315 AC 04/29
 
  PO   1200
 
Oxycodone/ 2 TAB Q6P PRN 04/26 2315 AC 
 
Acetaminophen  PO   
 
Polyethylene Glycol 17 GM DAILY 04/28 1000 AC 
 
  PO   
 
 
 
 
Past History
 
Medical History
Neurological: migraine
EENT: NONE
Cardiovascular: hypertension
Respiratory: collapsed left lung with a persistent left pleural effusion.
Gastrointestinal: GERD, GI BLEED
Hepatic: NONE
Renal: ESRD on HD, DIALYSIS M-W-F ACCESS L LEG
Musculoskeletal: gout, osteoarthritis, secondary OSTEOPOROSIS GANGREEN L 2ND 
FINGER
Psychiatric: NONE
Endocrine: hyperparathyroidism
Blood Disorders: NONE
Cancer(s): NONE
GYN/Reproductive: NONE
Other Medical Hx:
In 2004 he had an infected abscess with MSSA bacteremia.
 Complete central venous occlusion in 2007.
 
Surgical History
Pertinent Surgical History: GRAFT, LEFT THIGH s/p placement of numerous August 
catheters/also dual-lumen Amor's left radial cephalic primary AVF on 03/28/
2000 left upper arm AVG Tenckhoff catheter placement and subsequent removal 
status post amputation of the left index finger for gangrene
 
Family History
Relations & Conditions If Any:
MOTHER (Heart Dz, Breast Ca).
FATHER (Diabetes).
Relation not specified for:
  FH: stroke
 
 
Psychosocial History
Where Do You Live? Home
Who Do You Live With? self
Services at Home: Nursing
Primary Language: English
Smoking Status: Former Smoker
ETOH Use: denies use
Illicit Drug Use: denies illicit drug use
 
Functional Ability
ADLs
Independent: dressing, eating. 
Ambulation: cane
IADLs
Independent: shopping, housework, finances. 
 
Review of Systems
Review of Systems:
Complains of B/L wounds
 
Review of Systems
Constitutional:
Reports: no symptoms. 
 
Exam & Diagnostic Data
Vital Signs and I&O
Vital Signs
 
 
Date Time Temp Pulse Resp B/P B/P Pulse O2 O2 Flow FiO2
 
     Mean Ox Delivery Rate 
 
04/29 0638 97.4 84 20 108/68  93   
 
04/28 1600 98.5 92 20 102/74  992 Room Air  
 
 
 Intake & Output
 
 
 04/29 1600 04/29 0800 04/29 0000 04/28 1600 04/28 0800 04/28 0000
 
Intake Total    740  
 
Output Total      
 
Balance    740  
 
       
 
Intake, Oral    740  
 
Number    0  
 
Bowel      
 
Movements      
 
Patient 168 lb 168 lb   168 lb 
 
Weight      
 
Weight     Bed scale 
 
Measurement      
 
Method      
 
 
 
Physical Exam:
AF/VSS
 
Ext: B/L LE perfused, eschar on both heels of both feet, + L. finger necrotic 
area, no active purulence noted, no palpable pulses
 
Physical Exam
General Appearance: no apparent distress, anxious, cachetic
 
Assessment/Plan
Assessment/Plan
A/P 51 yo ESRD on HD with bilateral foot and L. finger ulcer--r/o progressive 
PAD s/p LLE angio. with Dr. Christensen 3/18.
 
1.) CTA A/P with LE runoff bilateral
2.) L. upper extremity arterial US
3.) Wound consult to consider for HBO rx
4.) Recommend keeping wounds dry with betadine
5.) Heel protection B/L to prevent further deep tissue injury
6.) Cont. medical Rx./ABX as needed
7.) Likely will need debridement as per Podiatry (Dr. Jones) 
 
 
Consult Acknowledgment
- Thank you for your consult request.

## 2018-04-29 NOTE — PN- HOUSESTAFF
Subjective
Follow-up For:
End-stage renal disease, left foot osteomyelitis
Subjective:
No overnight events.  Patient did not sleep well because of the noise.  He has 
no chest pain, shortness of breath, abdominal pain, or other issues.
 
Review of Systems
Constitutional:
Reports: no symptoms. 
EENTM:
Reports: no symptoms. 
Cardiovascular:
Reports: no symptoms. 
Respiratory:
Reports: no symptoms. 
Gastrointestinal:
Reports: no symptoms. 
Genitourinary:
Reports: no symptoms. 
Musculoskeletal:
Reports: no symptoms. 
Skin:
Reports: no symptoms. 
Neurological/Psychological:
Reports: no symptoms. 
Hematologic/Endocrine:
Reports: no symptoms. 
Immunologic/Allergic:
Reports: no symptoms. 
 
Objective
Last 24 Hrs of Vital Signs/I&O
 Vital Signs
 
 
Date Time Temp Pulse Resp B/P B/P Pulse O2 O2 Flow FiO2
 
     Mean Ox Delivery Rate 
 
0638 97.4 84 20 108/68  93   
 
 1600 98.5 92 20 102/74  992 Room Air  
 
 
 Intake & Output
 
 
  1600  0800  0000
 
Intake Total   
 
Output Total   
 
Balance   
 
    
 
Patient  76.317 kg 
 
Weight   
 
 
 
 
Physical Exam
General Appearance: Alert, Oriented X3, Cooperative, No Acute Distress
Cardiovascular: Regular Rate
Lungs: Clear to Auscultation
Abdomen: Normal Bowel Sounds, Soft, No Tenderness
Extremities: No Edema, Normal Pulses, No Tenderness/Swelling
Current Medications:
 Current Medications
 
 
  Sig/Jabari Start time  Last
 
Medication Dose Route Stop Time Status Admin
 
Acetaminophen 650 MG Q6P PRN 2315 AC 
 
  PO   
 
Docusate Sodium 100 MG DAILY AS NEEDED PRN  1000 AC 
 
  PO   
 
Heparin Sodium  5,000 UNIT Q8  2200 AC 
 
(Porcine)  SC   0703
 
Omeprazole 20 MG DAILY AC  0700 AC 
 
  PO   0704
 
Omeprazole 20 MG DAILY AC  0700 AC 
 
  PO   
 
Oxycodone HCl 5 MG Q6H  2315 AC 
 
  PO   0703
 
Oxycodone/ 2 TAB Q6P PRN 5 AC 
 
Acetaminophen  PO   
 
Polyethylene Glycol 17 GM DAILY  1000 AC 
 
  PO   
 
 
 
 
Last 24 Hrs of Lab/Arnold Results
Last 24 Hrs of Labs/Mics:
 Laboratory Tests
 
18 0730:
Sodium Pending, Potassium Pending, Chloride Pending, Carbon Dioxide Pending, 
Anion Gap Pending, BUN Pending, Creatinine Pending, BUN/Creatinine Ratio Pending
, CBC w Diff Pending, WBC Pending, RBC Pending, Hgb Pending, Hct Pending, MCV 
Pending, MCH Pending, MCHC Pending, RDW Pending, Plt Count Pending, MPV Pending
 
 
Assessment/Plan
Assessment:
Mr. Pérez is a 53yo M w/ PMH of HTN, history of collapsed left lung, GERD, ESRD 
on HD on MWF through left thigh catheter, HyperPTH, Hx of MSSA Bacteremia in 
. Patient was brought in by ambulance from home for bilateral foot pain and 
inability to walk. 
 
Assessment and plan
1.  Left foot osteomyelitis-patient is seen by podiatry who suggested MRI of the
left foot and further debridement depending upon the report.  However, the 
patient refused MRI due to claustrophobia.
2.  Right foot ulcer with necrosis.  Daily dressing and possible debridement in 
future.  Infectious disease consult placed.  Wound care consult placed.  
Attention to left finger ulceration as well.  Appreciate vascular surgery 
recommendations
3.  End-stage renal disease-patient is on hemodialysis on  and 
Friday.  He missed his past to dialysis due to weakness.  Nephrology consult 
placed.
4.  Weakness and gait instability-physical therapy consult placed.  He will 
likely need short-term rehab.
5.  Continue rest of his home medication.
 
Vascular surgery seems to be recommending arterial ultrasound of the lower 
extremities.  However the patient is refusing at this time.  He said he might do
it tomorrow.
 
Code-full code
Diet-renal dialysis diet
DVT prophylaxis-heparin subcutaneous
Problem List:
 1. ESRD (end stage renal disease)
 
Pain Ratin
Pain Location:
no
Pain Goal: Remain pain free
Pain Plan:
see a/p
Tomorrow's Labs & Rationales:
cbc, bep

## 2018-04-29 NOTE — PN- INFECT DX
Subjective
Subjective:
 He has no fever, chest pain, shortness of breath, or abdominal pain.
 
 
Review of Systems
Comments:
12 points reviewed as noted, otherwise negative.
 
Objective
Last 24 Hrs of Vital Signs/I&O
 Vital Signs
 
 
Date Time Temp Pulse Resp B/P B/P Pulse O2 O2 Flow FiO2
 
     Mean Ox Delivery Rate 
 
04/29 1414 97.5 92 20 104/60  97 Room Air  
 
04/29 0638 97.4 84 20 108/68  93   
 
 
 Intake & Output
 
 
 04/29 1600 04/29 0800 04/29 0000
 
Intake Total 480  
 
Output Total   
 
Balance 480  
 
    
 
Intake, Oral 480  
 
Number 0  
 
Bowel   
 
Movements   
 
Patient 168 lb 168 lb 
 
Weight   
 
 
 
 
Physical Exam
Other Physical Findings:
He is awake and alert in no acute distress.  He is afebrile.  Skin reveals no 
rash.  HEENT exam is negative.  Neck is supple with no adenopathy.  Lungs are 
clear.  Heart regular rhythm with no murmur.  Abdomen is soft, nontender with 
positive bowel sounds.  Back no CVA tenderness.  Extremities left heel necrotic 
ulcer on the posterior aspect, with no surrounding erythema; right heel necrotic
eschar on the lateral aspect, with no surrounding inflammation; erythematous 
discoloration and cool toes of the right foot; pulses 1+ and equal; status post 
amputation of the left second finger; left thigh AV fistula with no 
inflammation.  Neuro is without focality.
 
Results
Last 24 Hours of Lab Results:
 Laboratory Tests
 
 
 04/29
 
 0730
 
Chemistry 
 
  Sodium (137 - 145 mmol/L) 142
 
  Potassium (3.5 - 5.1 mmol/L) 4.6
 
  Chloride (98 - 107 mmol/L) 94  L
 
  Carbon Dioxide (22 - 30 mmol/L) 30
 
  Anion Gap (5 - 16) 19  H
 
  BUN (9 - 20 mg/dL) 37  H
 
  Creatinine (0.7 - 1.2 mg/dL) 11.1 *H
 
  Estimated GFR (>60 ml/min) 5  L
 
  BUN/Creatinine Ratio (7 - 25 %) 3.3  L
 
  Phosphorus (2.5 - 4.5 mg/dL) 6.4  H
 
Hematology 
 
  CBC w Diff NO MAN DIFF REQ
 
  WBC (4.8 - 10.8 /CUMM) 7.7
 
  RBC (4.70 - 6.10 /CUMM) 4.53  L
 
  Hgb (14.0 - 18.0 G/DL) 12.1  L
 
  Hct (42 - 52 %) 37.9  L
 
  MCV (80.0 - 94.0 FL) 83.7
 
  MCH (27.0 - 31.0 PG) 26.6  L
 
  MCHC (33.0 - 37.0 G/DL) 31.9  L
 
  RDW (11.5 - 14.5 %) 17.6  H
 
  Plt Count (130 - 400 /CUMM) 257
 
  MPV (7.4 - 10.4 FL) 9.8
 
  Gran % (42.2 - 75.2 %) 69.7
 
  Lymphocytes % (20.5 - 51.1 %) 15.0  L
 
  Monocytes % (1.7 - 9.3 %) 12.1  H
 
  Eosinophils % (0 - 5 %) 2.7
 
  Basophils % (0.0 - 2.0 %) 0.5
 
  Absolute Granulocytes (1.4 - 6.5 /CUMM) 5.4
 
  Absolute Lymphocytes (1.2 - 3.4 /CUMM) 1.2
 
  Absolute Monocytes (0.10 - 0.60 /CUMM) 0.9  H
 
  Absolute Eosinophils (0.0 - 0.7 /CUMM) 0.2
 
  Absolute Basophils (0.0 - 0.2 /CUMM) 0
 
 
 
Last 24 Hours of Arnold Results:
n/a
Recent Imaging Studies:
Xray
 
IMPRESSION:
Diffuse osteopenia and extensive vascular calcifications bilaterally.
Progression of the soft tissue ulcer posterior to the left calcaneus.
Suspected osteomyelitis at the base of the enthesophyte. This could be
confirmed with MRI if indicated.
 
DICTATED BY: Lamine Taylor MD 
DATE/TIME DICTATED:04/26/18 / 1434
:RAD.TONY 
DATE/TIME TRANSCRIBED:04/26/18 / 1434
 
 
Assessment/Plan ID
Impression:
 
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
abuse, maintained on hemodialysis Mondays, Wednesdays and Fridays for 18 years, 
with a nonhealing left heel ulcer for several months, status post debridement of
necrotic soft tissue with no evidence of extension to bone on a previous 
hospitalization 5 weeks prior to admission, with a left lower extremity 
angioplasty at that time, with MRI unable to be done secondary to anxiety, 
admitted on April 26 for dialysis after having missed 2 sessions because of 
inability to ambulate, found to be afebrile with a normal white blood cell count
and with an x-ray of the left foot suggestive of osteomyelitis of the left 
calcaneus.  
 
His clinical picture is consistent with osteomyelitis of the left heel and 
suspect that he will require debridement and antibiotics, based on the bone 
culture.  As he is stable he can continue to be followed off antibiotics pending
further evaluation.  Refusing MRI even w/ sedation.
 
Right foot ulcer with necrosis.  Daily dressing and possible debridement in 
future.
 
Suggestion:
 
1.  Further management of his left foot per Podiatry based on above; 
bone bx off abx; if febrile BC x2.
2. ESR/CRP weekly.
3.  F/u Vascular surgery recom
4.  Continue to follow off antibiotics pending above.

## 2018-04-29 NOTE — PN- ATT ADDEND
Attending Addendum
Attending Brief Note
Patient seen and examined.  Resting comfortably not in any acute distress.  No 
issues overnight reported by nursing staff.  Currently resting comfortably.  
Denies any lower extremity pain.  He is afebrile.  He is hemodynamically stable.
Vital Signs
 
 
Date Time Temp Pulse Resp B/P B/P Pulse O2 O2 Flow FiO2
 
     Mean Ox Delivery Rate 
 
04/29 0638 97.4 84 20 108/68  93   
 
04/28 1600 98.5 92 20 102/74  992 Room Air  
 
 
General appearance: Well-developed, not in acute distress
Heart: S1-S2 regular
Lungs: Clear bilaterally
Abdomen: Soft and nontender
Extremities: Intact dressing over the left foot.  Dry dressing over the left 
second finger.
 
 
 Laboratory Tests
 
04/29/18 0730:
Anion Gap 19  H, Estimated GFR 5  L, BUN/Creatinine Ratio 3.3  L, Phosphorus 6.4
 H, CBC w Diff NO MAN DIFF REQ, RBC 4.53  L, MCV 83.7, MCH 26.6  L, MCHC 31.9  L
, RDW 17.6  H, MPV 9.8, Gran % 69.7, Lymphocytes % 15.0  L, Monocytes % 12.1  H,
Eosinophils % 2.7, Basophils % 0.5, Absolute Granulocytes 5.4, Absolute 
Lymphocytes 1.2, Absolute Monocytes 0.9  H, Absolute Eosinophils 0.2, Absolute 
Basophils 0
 
Problems:
1.  Presumed left heel osteomyelitis.
2.  Necrotic foul-smelling ulceration on the second finger.
3.  End-stage renal disease on hemodialysis.
4.  Peripheral vascular disease.
 
Plan:
-Awaiting follow-up from the podiatry service.  Patient unable to perform closed
MRI due to claustrophobia.  He is refusing to be medicated for the procedure.  
If the MRI is the improvement by the podiatry service, plan should be made for 
having imaging done at another facility.
-Recommend wound consultation for evaluation of his ulceration on the second 
finger.  He will likely require imaging as well to rule out osteomyelitis of the
finger.  It is noted that patient has had amputation of the finger on that same 
hand for an infection in the past.
-Patient scheduled for hemodialysis Monday Wednesday Friday.  Recommend checking
serum chemistry only as needed by the nephrology service.  There is currently no
role of obtaining daily serum chemistries on this patient.

## 2018-04-30 VITALS — SYSTOLIC BLOOD PRESSURE: 104 MMHG | DIASTOLIC BLOOD PRESSURE: 68 MMHG

## 2018-04-30 VITALS — DIASTOLIC BLOOD PRESSURE: 70 MMHG | SYSTOLIC BLOOD PRESSURE: 106 MMHG

## 2018-04-30 VITALS — SYSTOLIC BLOOD PRESSURE: 123 MMHG | DIASTOLIC BLOOD PRESSURE: 90 MMHG

## 2018-04-30 LAB
ABSOLUTE GRANULOCYTE CT: 3.9 /CUMM (ref 1.4–6.5)
BASOPHILS # BLD: 0 /CUMM (ref 0–0.2)
BASOPHILS NFR BLD: 0 % (ref 0–2)
EOSINOPHIL # BLD: 0 /CUMM (ref 0–0.7)
EOSINOPHIL NFR BLD: 0 % (ref 0–5)
ERYTHROCYTE [DISTWIDTH] IN BLOOD BY AUTOMATED COUNT: 17 % (ref 11.5–14.5)
GRANULOCYTES NFR BLD: 87.5 % (ref 42.2–75.2)
HCT VFR BLD CALC: 36.9 % (ref 42–52)
LYMPHOCYTES # BLD: 0.5 /CUMM (ref 1.2–3.4)
MCH RBC QN AUTO: 27.3 PG (ref 27–31)
MCHC RBC AUTO-ENTMCNC: 33 G/DL (ref 33–37)
MCV RBC AUTO: 82.8 FL (ref 80–94)
MONOCYTES # BLD: 0.1 /CUMM (ref 0.1–0.6)
PLATELET # BLD: 271 /CUMM (ref 130–400)
PMV BLD AUTO: 9.7 FL (ref 7.4–10.4)
RED BLOOD CELL CT: 4.46 /CUMM (ref 4.7–6.1)
WBC # BLD AUTO: 4.5 /CUMM (ref 4.8–10.8)

## 2018-04-30 NOTE — PN- INFECT DX
Subjective
Subjective:
Afebrile.  He continues to complain of pain in the left heel.  
 
 
Objective
Last 24 Hrs of Vital Signs/I&O
 Vital Signs
 
 
Date Time Temp Pulse Resp B/P B/P Pulse O2 O2 Flow FiO2
 
     Mean Ox Delivery Rate 
 
04/30 1530       Room Air  
 
04/30 1507       Room Air  
 
04/30 1453 97.4 112 18 106/70  94   
 
04/30 0622 97.9 74 20 123/90  93   
 
04/29 2208 97.6 88 22 112/62  92 Room Air  
 
 
 Intake & Output
 
 
 04/30 1600 04/30 0800 04/30 0000
 
Intake Total  0 100
 
Output Total   
 
Balance  0 100
 
    
 
Intake, Oral  0 100
 
Patient 176 lb 172 lb 
 
Weight   
 
Weight  Bed scale 
 
Measurement   
 
Method   
 
 
 
 
Physical Exam
Other Physical Findings:
He appears comfortable in no acute distress
Extremities left posterior heel/ankle necrotic ulcer unchanged, with no 
surrounding inflammation; right heel lateral necrotic eschar unchanged, with no 
surrounding inflammation 
 
 
Results
Last 24 Hours of Lab Results:
 Laboratory Tests
 
 
 04/30
 
 0803
 
Chemistry 
 
  Sodium (137 - 145 mmol/L) 139
 
  Potassium (3.5 - 5.1 mmol/L) 5.3  H
 
  Chloride (98 - 107 mmol/L) 94  L
 
  Carbon Dioxide (22 - 30 mmol/L) 25
 
  Anion Gap (5 - 16) 19  H
 
  BUN (9 - 20 mg/dL) 53  H
 
  Creatinine (0.7 - 1.2 mg/dL) 12.7 *H
 
  Estimated GFR (>60 ml/min) 4  L
 
  BUN/Creatinine Ratio (7 - 25 %) 4.2  L
 
  Glucose (65 - 99 mg/dL) 148  H
 
  Calcium (8.4 - 10.2 mg/dL) 7.5  L
 
Hematology 
 
  CBC w Diff NO MAN DIFF REQ
 
  WBC (4.8 - 10.8 /CUMM) 4.5  L
 
  RBC (4.70 - 6.10 /CUMM) 4.46  L
 
  Hgb (14.0 - 18.0 G/DL) 12.2  L
 
  Hct (42 - 52 %) 36.9  L
 
  MCV (80.0 - 94.0 FL) 82.8
 
  MCH (27.0 - 31.0 PG) 27.3
 
  MCHC (33.0 - 37.0 G/DL) 33.0
 
  RDW (11.5 - 14.5 %) 17.0  H
 
  Plt Count (130 - 400 /CUMM) 271
 
  MPV (7.4 - 10.4 FL) 9.7
 
  Gran % (42.2 - 75.2 %) 87.5  H
 
  Lymphocytes % (20.5 - 51.1 %) 10.6  L
 
  Monocytes % (1.7 - 9.3 %) 1.9
 
  Eosinophils % (0 - 5 %) 0
 
  Basophils % (0.0 - 2.0 %) 0
 
  Absolute Granulocytes (1.4 - 6.5 /CUMM) 3.9
 
  Absolute Lymphocytes (1.2 - 3.4 /CUMM) 0.5  L
 
  Absolute Monocytes (0.10 - 0.60 /CUMM) 0.1
 
  Absolute Eosinophils (0.0 - 0.7 /CUMM) 0
 
  Absolute Basophils (0.0 - 0.2 /CUMM) 0
 
 
 
Last 24 Hours of Arnold Results:
No cultures
 
Recent Imaging Studies:
CT runoff angiogram April 30 reveals right mid popliteal disease with diseased 
runoff; 50% stenosis at the adductor canal and left SFA with a 50% narrowing in 
the left popliteal artery
 
 
Assessment/Plan ID
Impression:
Stable, with temperatures and white blood cell count remaining normal, off 
antibiotics, pending bone biopsy of the left heel to rule out osteomyelitis and 
left leg angiogram with possible intervention in the a.m.
 
Suggestion:
1.  Await OR in the a.m. for bone biopsy and angiogram
2.  Continue to follow off antibiotics pending above

## 2018-04-30 NOTE — DISCHARGE SUMMARY
Visit Information
 
Visit Dates
Admission Date:
04/26/18
 
Discharge Date:
5/9/18
 
 
Hospital Course
 
Course
Attending Physician:
Dewayne BEDOLLA,Karey
 
Primary Care Physician:
Patient Has No Primary Care Dr
 
Hospital Course:
52-year-old gentleman with history of end-stage renal disease secondary to 
analgesic abuse, on hemodialysis Mondays Wednesdays and Fridays, hypertension, 
CVA, seizures, hyperparathyroidism, GI bleed, with a nonhealing left heel ulcer 
for several months, was recently hospitalized 5 weeks prior to admission with 
severe pain in the left heel, underwent debridement of necrotic soft tissue and 
had no extension to bone of the time.  He also underwent left anterior tibial, 
posterior tibial, peroneal and dorsalis pedis angioplasty on that admission.  
MRI wasn't done secondary to anxiety issues.
On April 26, he presented to [facility] ED after having missed 2 sessions 
of dialysis.
On admission, he was afebrile.
 
Lab data: WBC 9000, BUN/creatinine 64/16, potassium 5.3 and phosphorus 9.3.
 
X-rays of both feet revealed diffuse osteopenia with progression to the soft 
tissue ulcer posterior to the left calcaneus, with possible osteomyelitis.
 
 
1.  Left foot osteomyelitis.  Patient was evaluated by podiatry, and they 
recommended obtaining MRI of the left foot.  Patient had previously refused to 
get an MRI due to anxiety issues.  Repeat attempt to get the MRI was 
unsuccessful, owing to anxiety issues.  Patient was also found to have right 
foot ulcer with necrosis and daily dressings were performed.  Patient has been 
watched off antibiotics initially, and he remained afebrile with normal white 
counts.
On 05/01/2018, Dr. Crocker (podiatrist) performed open incision and drainage at
multiple sites in the left ankle.  He also debrided necrotic bone posterior to 
left calcaneus.
On 05/03/2018, patient went to the [facility] for closure and negative pressure wound
therapy.
Patient remains stable to be discharged to a rehabilitation facility.  Patient 
was started on IV cefazolin on 05/04/2018, which she will continue taking upon 
discharge after each dialysis to complete a four-week course of treatment until 
May 31.
He needs to get weekly ESR while on cefazolin.
 
Postop foot x-ray.Postoperative changes are seen status post posterior calcaneal
debridement
with the calcaneal margin now appearing sharp with no definite residual lytic
lesion seen.
ESR on 05/04/2018-85.
 
2.  End-stage renal disease on hemodialysis.  Patient missed his multiple 
dialysis sessions prior to admission owing to persistent weakness.  Nephrology 
consult was placed, and patient was started on his scheduled Monday Wednesday Friday dialysis.  
 
3.  Nonhealing left heel ulcer.  As stated above, patient is status post left 
angiogram with angioplasty of anterior tibial, posterior tibial and peroneal 
arteries.  
On 5/1/18, patient underwent atherectomy of left anterior tibial artery, 
angioplasty of left anterior tibial, posterior tibial and peroneal arteries.  He
also had angioplasty of left popliteal and distal SFA.
 
4.  Tachycardia, EKG evidence of PVCs.  EKG was revealing of PVCs, and patient 
did complain of episodes of palpitation.  Cardiology evaluated Mr. Pérez and it 
was ascertained that his PVCs and PACs though frequent were without clinical 
evidence of unstable coronary or arrhythmic process.
He can follow up with cardiology to undergo a holter/echocardiogram/regadenosine
nuclear stress as outpatient.
 
Full code.
Renal dialysis diet.
Heparin for DVT prophylaxis.
 
****Patient's discharge was held, because the medical facility was denying to 
take the patient in view of patient history of constipation.  Last bowel 
movement was on April 19.  Patient's abdominal x-ray showed no obstruction.  
Bowel sounds heard.  [facility] medical director approved his acceptance to the
Center.
Allergies:
Coded Allergies:
Iodinated Contrast- Oral and IV Dye (IODINATED CONTRAST MEDIA - IV DYE) (RASH 03
/19/18)
 
 
Disposition Summary
 
Disposition
Principal Diagnosis:
Left foot osteomyelitis.
Additional Diagnosis:
Nonhealing left heel wound with nonpalpable pedal pulses
Discharge Disposition: SNF
 
Discharge Instructions
 
General Discharge Information
Code Status: Full Code
Patient's Diet:
Renal dialysis diet.
Patient's Activity:
As tolerated.
Follow-Up Instructions/Appts:
Please follow-up with Dr. Crocker is an patient.
Please follow-up with Dr. Christensen as an outpatient.
Continue Cefazolin 1 g IV after each dialysis to plan on a 4 week course of 
treatment from your most recent debridement (until May 31)
Weekly ESR while on Cefazolin
 
Please follow up with cardiology to undergo a holter/echocardiogram/regadenosine
nuclear stress as outpatient.
 
Medications at Discharge
Discharge Medications:
Continue taking these medications:
Atorvastatin Calcium (Atorvastatin Calcium) 40 MG TABLET
    40 Milligram ORAL 5 PM
    Qty = 30
    Comments:
       Last Taken:05/09/18
             Time:1215 PM
 
Aspirin (Ecotrin*) 81 MG TABLET.
    81 Milligram ORAL DAILY
    Qty = 30
    Comments:
       Last Taken:05/09/18
             Time:1215 PM
 
Omeprazole (Omeprazole) 20 MG CAPSULE.
    1 Capsule ORAL DAILY
    Qty = 30
    Comments:
       Last Taken:05/09/18
             Time:0600 AM
 
Start taking the following new medications:
Cefazolin Sodium (Cefazolin Sodium) 1 GRAM VIAL
    1,000 Milligram INTRAVEN SEE INSTRUCTIONS
    Qty = 12
    No Refills
    Comments:
       Last Taken:05/09/18
             Time:1215 PM
 
Oxycodone HCl/Acetaminophen (Percocet 5-325 MG Tablet) 5 MG-325 MG TABLET
    2 Tablet ORAL EVERY SIX HOURS AS NEEDED as needed for PAIN SCALE 7-10 (
SEVERE)
    Qty = 14
    No Refills
    Comments:
       Last Taken:05/09/18
             Time:1015 AM
 
 
Copies To:
Anibal Crocker DPM; Marilee BEDOLLA,Joe BHANDARI; Amparo BEDOLLA,Juan Pablo
 
Attending MD Review Statement
Documenting Attending:
Karey Buchanan MD

## 2018-04-30 NOTE — PN- HOUSESTAFF
Rocio BEDOLLA,Annemarie 18 0655:
Subjective
Follow-up For:
ESRD, LEFT FOOT OSTEO,RT HEEL ULCER
Complaints: no complaints
Subjective:
Patient seen and examined at bedside.  No overnight events.  Offers no 
complaints.  Denies pain in both foot, chest pain, shortness of breath.
 
Review of Systems
Constitutional:
Reports: no symptoms, see HPI. 
 
Objective
Last 24 Hrs of Vital Signs/I&O
 Vital Signs
 
 
Date Time Temp Pulse Resp B/P B/P Pulse O2 O2 Flow FiO2
 
     Mean Ox Delivery Rate 
 
622 97.9 74 20 123/90  93   
 
 2208 97.6 88 22 112/62  92 Room Air  
 
 1414 97.5 92 20 104/60  97 Room Air  
 
 
 Intake & Output
 
 
  1600  0800  0000
 
Intake Total  0 100
 
Output Total   
 
Balance  0 100
 
    
 
Intake, Oral  0 100
 
Patient  172 lb 
 
Weight   
 
Weight  Bed scale 
 
Measurement   
 
Method   
 
 
 
 
Physical Exam
General Appearance: Alert, Oriented X3, Cooperative, No Acute Distress
Cardiovascular: Regular Rate, Normal S1, Normal S2, No Murmurs
Lungs: Clear to Auscultation
Abdomen: Soft, No Tenderness
Neurological: Normal Speech, Sensation Intact
Extremities: left foor-dressing intact, right heel ulcer with necrosis
Current Medications:
 Current Medications
 
 
  Sig/Jabari Start time  Last
 
Medication Dose Route Stop Time Status Admin
 
Acetaminophen 650 MG Q6P PRN  2315 AC 
 
  PO   
 
Diphenhydramine HCl 50 MG ONCE ONE  0600 DC 
 
  IV  0601  0706
 
Diphenhydramine HCl 50 MG ONCE ONE  1615 CAN 
 
  IV  1616  
 
Docusate Sodium 100 MG DAILY AS NEEDED PRN  1000 AC 
 
  PO   
 
Heparin Sodium  5,000 UNIT Q8  2200 AC 
 
(Porcine)  SC   0628
 
Omeprazole 20 MG DAILY AC  0700 AC 
 
  PO   0628
 
Omeprazole 20 MG DAILY AC  0700 AC 
 
  PO   
 
Oxycodone HCl 5 MG Q6H  2315 AC 
 
  PO   2258
 
Oxycodone/ 2 TAB Q6P PRN  2315 AC 
 
Acetaminophen  PO   
 
Polyethylene Glycol 17 GM DAILY  1000 AC 
 
  PO   
 
Prednisone 50 MG ONCE ONE  0600 DC 
 
  PO  0601  0629
 
Prednisone 50 MG ONCE ONE  2300 DC 
 
  PO  2301  2259
 
Prednisone 50 MG ONCE ONE  1700 DC 
 
  PO  1701  1822
 
 
 
 
Last 24 Hrs of Lab/Arnold Results
Last 24 Hrs of Labs/Mics:
 Laboratory Tests
 
18 0803:
Sodium Pending, Potassium Pending, Chloride Pending, Carbon Dioxide Pending, 
Anion Gap Pending, BUN Pending, Creatinine Pending, BUN/Creatinine Ratio Pending
, Glucose Pending, Calcium Pending, CBC w Diff NO MAN DIFF REQ, RBC 4.46  L, MCV
82.8, MCH 27.3, MCHC 33.0, RDW 17.0  H, MPV 9.7, Gran % 87.5  H, Lymphocytes % 
10.6  L, Monocytes % 1.9, Eosinophils % 0, Basophils % 0, Absolute Granulocytes 
3.9, Absolute Lymphocytes 0.5  L, Absolute Monocytes 0.1, Absolute Eosinophils 0
, Absolute Basophils 0
 
 
Assessment/Plan
Assessment:
Mr. Pérez is a 53yo M w/ PMH of HTN, history of collapsed left lung, GERD, ESRD 
on HD on MWF through left thigh catheter, HyperPTH, Hx of MSSA Bacteremia in 
. Patient was brought in by ambulance from home for bilateral foot pain and 
inability to walk. 
 
Assessment and plan:
 
1.  Left foot osteomyelitis-patient is seen by podiatry who suggested MRI of the
left foot 3 days ago but patient refuses MRI due to claustrophobia. 
2.  Right foot ulcer with necrosis.  Daily dressing and possible debridement in 
future.  Infectious disease is on board who suggested to get a vascular surgery 
consult and follow him off antibiotics.  Vascular surgery saw him and suggested 
CTA runoff to look for any worsening of his peripheral vascular disease. 
 Wound care consult placed.
3.  End-stage renal disease-patient is on hemodialysis on  and 
Friday.  He missed his past to dialysis due to weakness.  Nephrology on board.  
Patient is going for dialysis today.  
4.  Weakness and gait instability-physical therapy consult placed.  According to
podiatry he can weight bear.
5.  Continue rest of his home medication.
Problem List:
 1. ESRD (end stage renal disease)
 
 2. Osteomyelitis of ankle or foot, left, acute
 
Pain Ratin
Pain Location:
both feet
Pain Goal: Remain pain free
Pain Plan:
tylenol
Tomorrow's Labs & Rationales:
none
 
 
Karey Buchanan MD 18 1319:
Attending MD Review Statement
 
Attending Statement
Attending MD Statement: examined this patient, discuss w/resident/PA/NP, agreed 
w/resident/PA/NP, reviewed EMR data (avail), discussed with nursing, discussed 
with case mgmt, reviewed images, amended to note
Attending Assessment/Plan:
Patient seen and examined, denies any complaints.  Back from dialysis.  Vital 
signs are stable.  Patient underwent CTA with runoff of LLE yestarday.  Seen by 
vascular surgeon plan to do angiogram tomorrow.  Dr. Crocker will also perform 
debridement tomorrow.  Patient's next dialysis is due on Wednesday.  He has been
watched off of antibiotics.  Continue current management.

## 2018-04-30 NOTE — PN- VASCULAR SURGERY
Surgical Brief Attending Note
Brief Attending Note:
Nonhealing left heel ulcer with evidence suggestive of osteomyelitis.
Patient is on hemodialysis via a left groin AV graft
Dialysis R Monday Wednesday Friday.  
He is status post left leg angiogram with angioplasty of anterior tibial, 
posterior tibial, and peroneal arteries.
He is on the OR schedule tomorrow for left leg angiogram and possible 
interventions.
Dr. Crocker from podiatry is also performing debridement tomorrow.

## 2018-04-30 NOTE — CT SCAN REPORT
EXAMINATION:
CT ANGIOGRAPHY OF THE ABDOMEN PELVIS AND BILATERAL LOWER EXTREMITIES WITH
RUNOFF
 
CLINICAL INFORMATION:
Bilateral leg pain with ulcers.
 
COMPARISON:
Chest radiograph 05/26/2017  and CT abdomen pelvis 04/20/2015.
 
TECHNIQUE:
Volume acquisition CT of the abdomen, pelvis, and bilateral lower extremities
was performed both with and without IV contrast. The patient was premedicated
with iodinated contrast.
 
DLP:
1002.91\H\ \N\mGy-cm
 
FINDINGS:
 
VASCULAR FINDINGS:
Aorta and its branches: Descending thoracic aorta appears normal. Celiac
appears normal. The right hepatic artery is replaced arising from the SMA.
The abdominal aorta appears normal with mild atherosclerotic changes. There
are 2 renal arteries present on the right and a single renal artery on the
left. The kidneys are tiny and atrophic. The common iliac arteries appear
normal. Both hypogastrics are calcified but appear widely patent. The common
femoral arteries are widely patent.  From the level of the common femoral
artery downwards, fairly diffuse mural arterial calcification is seen.
 
RUNOFF:
Right: The common femoral artery is patent. There is a tight stenosis
involving a proximal branch of the profunda femoris artery. The SFA is
patent. Popliteal is diseased with at least 2 areas of mild stenosis. The
trifurcation is patent. It is extremely difficult to evaluate the tibial
vessels because of marked calcification. The anterior tibial has a stenosis
proximally but appears to be patent distally at least to the level of the
ankle. The tibioperoneal trunk is patent. The peroneal artery demonstrates
moderate to severe disease proximally but appears to be patent distally. The
posterior tibial artery is heavily diseased and probably occluded.
 
Left: There is a left groin thigh dialysis fistula present which is patent.
There is a stent at the venous anastomosis which is patent but with mural
thrombus which produces a mild to moderate stenosis. The SFA below this is
patent. The profunda femoris is patent. There is some artifact overlying the
area of the distal SFA which makes this difficult to evaluate. There is an
approximately 50% stenosis at the level of the adductor canal. The popliteal
demonstrates approximately 50% area of stenosis. The trifurcation is patent.
As on the right, extensive mural calcification makes evaluation difficult.
All 3 runoff vessels are diseased. The lower portion of the peroneal artery
is patent without calcifications.
 
NONVASCULAR:
Evaluation of the lung bases demonstrates severe probable postsurgical
changes in the left hemithorax with marked pleural calcification. These
findings have been present dating back to at least 2016.  The spleen is
enlarged measuring 14.5 cm in length. The liver, gallbladder, and bile ducts
are unremarkable. The pancreas is unremarkable. Both kidneys are small and
atrophic with cystic disease consistent with chronic renal insufficiency. No
retroperitoneal adenopathy is seen. No significant bowel pathology is present
aside from colonic diverticular changes without evidence of diverticulitis.
No ascites is present. The bones are grossly abnormal with appearance
compatible with chronic renal insufficiency.
 
IMPRESSION:
Vascular:
Aortic inflow is not compromised.
 
Right mild popliteal disease with diseased distal runoff as described above.
 
50% stenosis at the adductor canal and left SFA with a similar area of 50%
narrowing seen in the popliteal artery on the left. Disease runoff with
marked limitations as described above.
 
The patient's left -sided thigh loop dialysis graft is patent with a stent at
the venous anastomosis which has a mild to moderate stenosis distally.
 
Because of the limitations for evaluating the heavily calcified arterial
system described above, if any intervention is complicated MRA may be useful
for further evaluation because of the marked amount of calcifications
present.
 
Nonvascular:
Chronic changes left hemithorax as described above.
 
Mild splenomegaly.
 
Colonic diverticulosis.
 
Bone changes compatible with renal insufficiency.
 
Small diseased kidneys compatible with chronic kidney disease and dialysis.

## 2018-04-30 NOTE — PN- NEPHROLOGY
Assessment/Plan Nephrology
Assessment:
ESRD on dialysis now. tolerating well. 
 
Suggestion:
.
 
 
Subjective
Subjective:
Pt on dialysis now.  
 
Objective
Vital Signs and I&Os
/90  P 74  T 97.9
M NAD]
Skin neg rash
Lungs clear
Cor RRR
Abd soft
Ext neg edema
 
 
Results
Pertinent Lab Results:
 
139 / 94 / 53 /
5.3  / 19  / 12.7\

## 2018-05-01 VITALS — SYSTOLIC BLOOD PRESSURE: 132 MMHG | DIASTOLIC BLOOD PRESSURE: 98 MMHG

## 2018-05-01 VITALS — SYSTOLIC BLOOD PRESSURE: 130 MMHG | DIASTOLIC BLOOD PRESSURE: 84 MMHG

## 2018-05-01 VITALS — SYSTOLIC BLOOD PRESSURE: 112 MMHG | DIASTOLIC BLOOD PRESSURE: 58 MMHG

## 2018-05-01 VITALS — SYSTOLIC BLOOD PRESSURE: 120 MMHG

## 2018-05-01 LAB
ABSOLUTE GRANULOCYTE CT: 9.8 /CUMM (ref 1.4–6.5)
BASOPHILS # BLD: 0 /CUMM (ref 0–0.2)
BASOPHILS NFR BLD: 0 % (ref 0–2)
EOSINOPHIL # BLD: 0 /CUMM (ref 0–0.7)
EOSINOPHIL NFR BLD: 0.2 % (ref 0–5)
ERYTHROCYTE [DISTWIDTH] IN BLOOD BY AUTOMATED COUNT: 17.5 % (ref 11.5–14.5)
GRANULOCYTES NFR BLD: 79.9 % (ref 42.2–75.2)
HCT VFR BLD CALC: 40.7 % (ref 42–52)
LYMPHOCYTES # BLD: 1.6 /CUMM (ref 1.2–3.4)
MCH RBC QN AUTO: 26.4 PG (ref 27–31)
MCHC RBC AUTO-ENTMCNC: 31.5 G/DL (ref 33–37)
MCV RBC AUTO: 83.9 FL (ref 80–94)
MONOCYTES # BLD: 0.8 /CUMM (ref 0.1–0.6)
PLATELET # BLD: 317 /CUMM (ref 130–400)
PMV BLD AUTO: 9.6 FL (ref 7.4–10.4)
RED BLOOD CELL CT: 4.86 /CUMM (ref 4.7–6.1)
WBC # BLD AUTO: 12.3 /CUMM (ref 4.8–10.8)

## 2018-05-01 PROCEDURE — 0QBM0ZZ EXCISION OF LEFT TARSAL, OPEN APPROACH: ICD-10-PCS | Performed by: PODIATRIST

## 2018-05-01 PROCEDURE — 04CQ0ZZ EXTIRPATION OF MATTER FROM LEFT ANTERIOR TIBIAL ARTERY, OPEN APPROACH: ICD-10-PCS | Performed by: SURGERY

## 2018-05-01 PROCEDURE — 04CQ3ZZ EXTIRPATION OF MATTER FROM LEFT ANTERIOR TIBIAL ARTERY, PERCUTANEOUS APPROACH: ICD-10-PCS | Performed by: SURGERY

## 2018-05-01 PROCEDURE — B41D1ZZ FLUOROSCOPY OF AORTA AND BILATERAL LOWER EXTREMITY ARTERIES USING LOW OSMOLAR CONTRAST: ICD-10-PCS | Performed by: SURGERY

## 2018-05-01 PROCEDURE — 02733ZZ DILATION OF CORONARY ARTERY, FOUR OR MORE ARTERIES, PERCUTANEOUS APPROACH: ICD-10-PCS | Performed by: SURGERY

## 2018-05-01 NOTE — PN- VASCULAR SURGERY
Subjective
Subjective:
Patient offers no complaints. Pain is well controlled. Denies c/p, fever, chills
, numbness, tingling, nausea or vomiting.
 
Objective
Vital Signs and I&Os
Vital Signs
 
 
Date Time Temp Pulse Resp B/P B/P Pulse O2 O2 Flow FiO2
 
     Mean Ox Delivery Rate 
 
05/01 1505 97.7 74 18 112/58  98 Nasal 2.0L 
 
       Cannula  
 
05/01 0804    132/98     
 
05/01 0636 97.8 84 20 120/00  98 Room Air  
 
04/30 2117 97.7 106 19 104/68  95 Room Air  
 
 
 Intake & Output
 
 
 05/01 1600 05/01 0800 05/01 0000 04/30 1600 04/30 0800 04/30 0000
 
Intake Total 0 0  400 0 100
 
Output Total 0  0   
 
Balance 0 0 0 400 0 100
 
       
 
Intake, IV 0   0  
 
Intake, Oral 0 0  400 0 100
 
Number 0 0 0   
 
Bowel      
 
Movements      
 
Output, Urine 0  0   
 
Patient    176 lb 172 lb 
 
Weight      
 
Weight     Bed scale 
 
Measurement      
 
Method      
 
 
 
Physical Exam:
Gen - laying flat in bed in nad
Cardiac - S1S2
Lungs - anterior breath sounds CTAB
Abd - soft, nondistended, nontender
 - R groin dressing c/d/i, no drainage or hematoma noted, nontender 
Ext - LLE dressing in place with doppler DP signals, RLE with palpable DP/PT, 
foot is warm, motor and sensory intact, no edema or calf tenderness
Current Medications:
 Current Medications
 
 
  Sig/Jabari Start time  Last
 
Medication Dose Route Stop Time Status Admin
 
Acetaminophen 650 MG Q6P PRN 04/26 2315 AC 
 
  PO   
 
Docusate Sodium 100 MG DAILY AS NEEDED PRN 04/28 1000 AC 
 
  PO   
 
Heparin Sodium  5,000 UNIT Q8 04/26 2200 AC 04/30
 
(Porcine)  SC   0628
 
Omeprazole 20 MG DAILY AC 04/27 0700 AC 04/30
 
  PO   0628
 
Omeprazole 20 MG DAILY AC 04/27 0700 AC 
 
  PO   
 
Oxycodone HCl 5 MG Q6H 04/26 2315 AC 04/30
 
  PO   1452
 
Oxycodone/ 2 TAB Q6P PRN 04/26 2315 AC 
 
Acetaminophen  PO   
 
Polyethylene Glycol 17 GM DAILY 04/28 1000 AC 
 
  PO   
 
 
 
 
Results
Last 48 Hours of Labs:
 Laboratory Tests
 
 
 05/01 05/01
 
 1428 0836
 
Chemistry  
 
  Sodium (137 - 145 mmol/L) Cancelled 145
 
  Potassium (3.5 - 5.1 mmol/L) Cancelled 4.2
 
  Chloride (98 - 107 mmol/L) Cancelled 94  L
 
  Carbon Dioxide (22 - 30 mmol/L) Cancelled 33  H
 
  Anion Gap (5 - 16) Cancelled 18  H
 
  BUN (9 - 20 mg/dL) Cancelled 46  H
 
  Creatinine (0.7 - 1.2 mg/dL) Cancelled 8.4 *H
 
  Estimated GFR (>60 ml/min)  7  L
 
  BUN/Creatinine Ratio (7 - 25 %) Cancelled 5.5  L
 
Hematology  
 
  CBC w Diff  NO MAN DIFF REQ
 
  WBC (4.8 - 10.8 /CUMM)  12.3  H
 
  RBC (4.70 - 6.10 /CUMM)  4.86
 
  Hgb (14.0 - 18.0 G/DL)  12.8  L
 
  Hct (42 - 52 %)  40.7  L
 
  MCV (80.0 - 94.0 FL)  83.9
 
  MCH (27.0 - 31.0 PG)  26.4  L
 
  MCHC (33.0 - 37.0 G/DL)  31.5  L
 
  RDW (11.5 - 14.5 %)  17.5  H
 
  Plt Count (130 - 400 /CUMM)  317
 
  MPV (7.4 - 10.4 FL)  9.6
 
  Gran % (42.2 - 75.2 %)  79.9  H
 
  Lymphocytes % (20.5 - 51.1 %)  13.1  L
 
  Monocytes % (1.7 - 9.3 %)  6.8
 
  Eosinophils % (0 - 5 %)  0.2
 
  Basophils % (0.0 - 2.0 %)  0
 
  Absolute Granulocytes (1.4 - 6.5 /CUMM)  9.8  H
 
  Absolute Lymphocytes (1.2 - 3.4 /CUMM)  1.6
 
  Absolute Monocytes (0.10 - 0.60 /CUMM)  0.8  H
 
  Absolute Eosinophils (0.0 - 0.7 /CUMM)  0
 
  Absolute Basophils (0.0 - 0.2 /CUMM)  0
 
 
 
 
 04/30
 
 0803
 
Chemistry 
 
  Sodium (137 - 145 mmol/L) 139
 
  Potassium (3.5 - 5.1 mmol/L) 5.3  H
 
  Chloride (98 - 107 mmol/L) 94  L
 
  Carbon Dioxide (22 - 30 mmol/L) 25
 
  Anion Gap (5 - 16) 19  H
 
  BUN (9 - 20 mg/dL) 53  H
 
  Creatinine (0.7 - 1.2 mg/dL) 12.7 *H
 
  Estimated GFR (>60 ml/min) 4  L
 
  BUN/Creatinine Ratio (7 - 25 %) 4.2  L
 
  Glucose (65 - 99 mg/dL) 148  H
 
  Calcium (8.4 - 10.2 mg/dL) 7.5  L
 
Hematology 
 
  CBC w Diff NO MAN DIFF REQ
 
  WBC (4.8 - 10.8 /CUMM) 4.5  L
 
  RBC (4.70 - 6.10 /CUMM) 4.46  L
 
  Hgb (14.0 - 18.0 G/DL) 12.2  L
 
  Hct (42 - 52 %) 36.9  L
 
  MCV (80.0 - 94.0 FL) 82.8
 
  MCH (27.0 - 31.0 PG) 27.3
 
  MCHC (33.0 - 37.0 G/DL) 33.0
 
  RDW (11.5 - 14.5 %) 17.0  H
 
  Plt Count (130 - 400 /CUMM) 271
 
  MPV (7.4 - 10.4 FL) 9.7
 
  Gran % (42.2 - 75.2 %) 87.5  H
 
  Lymphocytes % (20.5 - 51.1 %) 10.6  L
 
  Monocytes % (1.7 - 9.3 %) 1.9
 
  Eosinophils % (0 - 5 %) 0
 
  Basophils % (0.0 - 2.0 %) 0
 
  Absolute Granulocytes (1.4 - 6.5 /CUMM) 3.9
 
  Absolute Lymphocytes (1.2 - 3.4 /CUMM) 0.5  L
 
  Absolute Monocytes (0.10 - 0.60 /CUMM) 0.1
 
  Absolute Eosinophils (0.0 - 0.7 /CUMM) 0
 
  Absolute Basophils (0.0 - 0.2 /CUMM) 0
 
 
 
 
Assessment/Plan
Assessment/Plan
52 M s/p angio with atherectomy of left anterior tibial artery and angioplasty 
of left popliteal, distal SFA, anterior tibial, posterior tibial and peroneal 
arteries with doppler DP signals
 
Lay flat until 17:30 tonight
Advance diet as tolerated
Pain regimen prn
DVT ppx - hsq
All other medical management per primary team
Will d/w Dr Christensen

## 2018-05-01 NOTE — PN- HOUSESTAFF
**See Addendum**
Subjective
Follow-up For:
End-stage renal disease, left foot osteomyelitis, right heel ulcer
Subjective:
pt seen and examined at bedside no overnight events.  No complaints.  Denies 
pain in his both foot.
 
Review of Systems
Constitutional:
Reports: no symptoms, see HPI. 
 
Objective
Last 24 Hrs of Vital Signs/I&O
 Vital Signs
 
 
Date Time Temp Pulse Resp B/P B/P Pulse O2 O2 Flow FiO2
 
     Mean Ox Delivery Rate 
 
 0804    132/98     
 
 0636 97.8 84 20 120/00  98 Room Air  
 
 2117 97.7 106 19 104/68  95 Room Air  
 
 1530       Room Air  
 
 1507       Room Air  
 
 1453 97.4 112 18 106/70  94   
 
 
 Intake & Output
 
 
  1600  0800  0000
 
Intake Total  0 
 
Output Total   0
 
Balance  0 0
 
    
 
Intake, Oral  0 
 
Number  0 0
 
Bowel   
 
Movements   
 
Output, Urine   0
 
 
 
 
Physical Exam
General Appearance: Alert, Oriented X3, Cooperative, No Acute Distress
Cardiovascular: Regular Rate, Normal S1, Normal S2, No Murmurs
Lungs: Clear to Auscultation
Abdomen: Normal Bowel Sounds, Soft, No Tenderness
Neurological: Normal Speech, Strength at 5/5 X4 Ext, Normal Tone, Sensation 
Intact
Extremities: both foot dressing intact
Current Medications:
 Current Medications
 
 
  Sig/Jabari Start time  Last
 
Medication Dose Route Stop Time Status Admin
 
Acetaminophen 650 MG Q6P PRN 5 AC 
 
  PO   
 
Docusate Sodium 100 MG DAILY AS NEEDED PRN  1000 AC 
 
  PO   
 
Heparin Sodium  5,000 UNIT Q8  2200 AC 
 
(Porcine)  SC   0628
 
Omeprazole 20 MG DAILY AC  0700 AC 
 
  PO   0628
 
Omeprazole 20 MG DAILY AC  0700 AC 
 
  PO   
 
Oxycodone HCl 5 MG Q6H  2315 AC 
 
  PO   1452
 
Oxycodone/ 2 TAB Q6P PRN  2315 AC 
 
Acetaminophen  PO   
 
Polyethylene Glycol 17 GM DAILY  1000 AC 
 
  PO   
 
 
 
 
Last 24 Hrs of Lab/Arnold Results
Last 24 Hrs of Labs/Mics:
 Laboratory Tests
 
18 0836:
Anion Gap 18  H, Estimated GFR 7  L, BUN/Creatinine Ratio 5.5  L, CBC w Diff NO 
MAN DIFF REQ, RBC 4.86, MCV 83.9, MCH 26.4  L, MCHC 31.5  L, RDW 17.5  H, MPV 
9.6, Gran % 79.9  H, Lymphocytes % 13.1  L, Monocytes % 6.8, Eosinophils % 0.2, 
Basophils % 0, Absolute Granulocytes 9.8  H, Absolute Lymphocytes 1.6, Absolute 
Monocytes 0.8  H, Absolute Eosinophils 0, Absolute Basophils 0
 
 
Assessment/Plan
Assessment:
Mr. Pérez is a 53yo M w/ PMH of HTN, history of collapsed left lung, GERD, ESRD 
on HD on MWF through left thigh catheter, HyperPTH, Hx of MSSA Bacteremia in 
. Patient was brought in by ambulance from home for bilateral foot pain and 
inability to walk. 
 
Assessment and plan:
 
1.  Left foot osteomyelitis-patient denied MRI due to claustrophobia.  Patient 
had a CTA runoff which right mild popliteal disease, 50% stenosis at the 
adductor canal and left SFA with 50% narrowing in the popliteal artery on the 
left side.  Patient was seen by Dr. Meier who is decided to go for 
angiogram followed by procedures depending upon the angiogram report.  Patient 
will be seen by Dr. Crocker who is planning to both food B Brightman.
2.  Right foot ulcer with necrosis -   Daily dressing and possible debridement 
in future.  Infectious disease on board.  Patient is followed off antibiotics.
3.  End-stage renal disease-patient is on hemodialysis on  and 
Friday.  He missed his past to dialysis due to weakness.  Nephrology on board.  
Patient is going for dialysis today.  Patient refused renal diet and upon 
further counseling and education patient agreed to be on renal diet for now.  He
feels that his renal diet is not helping him much.
4.  Weakness and gait instability-physical therapy consult placed.  Seen by 
physical therapy as suggested short-term rehabilitation.  According to podiatry 
he can weight bear.
5.  Continue rest of his home medication.
 
 
update -pt can wt bear and goes back to or for partial calcanectomy and wound 
vac as per podiatry. Pt also has osteomyelitis and will await final cultures 
asper ID.
Angiogram-
patient had Third order left leg angiogram
-Atherectomy of left anterior tibial artery
-Angioplasty of left anterior tibial, posterior tibial, and peroneal arteries
-Angioplasty of left popliteal and distal SFA
Problem List:
 1. ESRD (end stage renal disease)
 
 2. Osteomyelitis of ankle or foot, left, acute
 
 3. Foot pain
 
Pain Ratin
Pain Location:
none
Pain Goal: Remain pain free
Pain Plan:
tylenol
Tomorrow's Labs & Rationales:
wil child

## 2018-05-01 NOTE — OPERATIVE REPORT
Operative/Inv Procedure Report
Surgery Date: 05/01/18
Name of Procedure:
1 open incision and drainage deep to the deep fascia with exposure of the flexor
tendon and tendon sheath multiple sites left ankle
2 debridement of necrotic bone left posterior calcaneus
3 intraoperative administration of ankle block anesthesia
4 excisional debridement
Pre-Operative Diagnosis:
1 open, necrotic wound posterior left ankle
2 osteomyelitis left heel
3 diabetic peripheral neuropathy
4 severe peripheral arterial disease
Post-Operative Diagnosis:
The same
Estimated Blood Loss: less than 50ml
Surgeon/Assistant:
DAIJA WHITE DPM
 
Anesthesia: laryngeal mask airway
 
Operative/Procedure Note
Note:
After obtaining informed consent the patient was brought to the operating room 
and placed on the operating table in the supine position.  The patient isn't 
securely fastened to the operating table utilizing safety belt.  After 
administration of general mask airway anesthesia, 10 mL of 0.5% Marcaine plain 
was infiltrated about the patient's left ankle.  The left foot and ankle then 
scrubbed prepped and draped in usual aseptic manner.  Attention directed 
posterior aspect of the left ankle, where a large full-thickness necrotic was 
identified.  A 15 blade visualized sharply revised skin margins.  Dissection was
then carried down deep to the fascia with exposure of the Achilles tendon and 
tendon sheath multiple sites, both proximal and distally.  All necrotic 
nonviable infected tissue sharply evacuated wound bed.  Necrotic bone was 
identified at the insertion point of the distal Achilles tendon and this was 
debrided.  Specimen was sent for both microbiologic and pathologic inspection.  
The open wound was then irrigated with 3 L of normal sterile saline infusion 50,
000 units of bacitracin utilizing the pulse lavage device.  Following this, the 
foot was redraped and the surgeon's top gloves were changed clean gloves.  Any 
bleeding vessels identified were cauterized or ligated as encountered.  The 
wound was then dressed with Xeroform, 4 x 4's Xeroform and an Ace wrap.

## 2018-05-01 NOTE — OPERATIVE REPORT
Operative/Inv Procedure Report
Surgery Date: 05/01/18
Name of Procedure:
-Ultrasound-guided right common femoral artery access
-Aortogram
-Third order left leg angiogram
-Atherectomy of left anterior tibial artery
-Angioplasty of left anterior tibial, posterior tibial, and peroneal arteries
-Angioplasty of left popliteal and distal SFA
Pre-Operative Diagnosis:
Nonhealing left heel wound with nonpalpable pedal pulses
Post-Operative Diagnosis:
Same
Estimated Blood Loss: scant
Surgeon/Assistant:
ALIYAH CARVER MD
 
Anesthesia: laryngeal mask airway
 
Operative/Procedure Note
Note:
Patient is a 52-year-old gentleman with multiple medical issues on hemodialysis 
via a left groin AV graft.  He status post multiple failed failed hemodialysis 
access.  In March of this year, he underwent left leg angiogram with angioplasty
of peroneal, anterior and posterior tibial arteries for nonhealing left foot 
wound.  He was just admitted several days ago with infected left heel wound.
The patient demonstrated palpable pedal pulses.  An angiogram with possible 
intervention was discussed with the patient.  The nature of the procedure 
including is possible complications which included but not limited to bleeding, 
infection, injury to vessels, blood clots, need for re-intervention were 
discussed.  Consent was obtained.
 
Patient was taken to the operating room and placed supine on the table.  A 
timeout was called according to protocol.  After satisfactory induction of 
anesthesia, the patient was prepped and draped in standard surgical fashion.  Of
note, the patient underwent a left heel debridement which was performed by Dr. Crocker.  For the details of this part of the operation, please refer to his 
operative note.
 
Using an ultrasound, right common femoral artery was accessed using 
micropuncture technique.  A Bentson wire was advanced into the aorta under 
direct fluoroscopic guidance.  The micropuncture sheath was exchanged with a 5 
Icelandic sheath.  An Omni Flush catheter was advanced over the wire and placed 
into the abdominal aorta.  From this position, an aortogram was performed which 
showed patent aorta and bilateral renal arteries with no significant disease.  
Bilateral common iliac, internal iliac, and external iliac arteries were patent 
with no significant disease.
 
Using the Omni Flush catheter and Bentson wire, left iliac artery system was 
selected.  The Omni Flush catheter was exchanged with a 5 Icelandic glide catheter 
which was advanced over the wire and placed into the proximal left common 
femoral artery under direct fluoroscopic guidance.  From this position, left leg
angina was performed which showed patent common femoral.  The AV graft is patent
with was at the proximal segments which is previously stented.  SFA was patent 
with no significant disease.  There was however, sluggish flow through the SFA 
which is most likely due to phenomena from the AV graft which quickly fills 
first.  Due to slow flow into the SFA and lower arteries, the glide catheter was
advanced over the wire and placed into the distal SFA.  From this position, the 
remaining of the angiogram was performed which showed approximately 50% stenosis
at the distal SFA as well as popliteal artery.  Anterior tibial arteries patent 
with diffuse moderate to severe disease.  Peroneal artery is patent with diffuse
moderate to severe disease.  Posterior tibial artery appears to be patent from 
it's origin it begins to feel much later.
 
At this time, I decided to intervene.  The 5 Icelandic sheath was exchanged with a 
70 cm 6 Icelandic Ansell sheath which was advanced over the wire and placed into 
the survey on the direct fluoroscopic guidance.  6500 units of heparin was 
given.  I decided to perform atherectomy of the anterior tibial artery which is 
the moment artery to the foot.  An 014 Viper wire was advanced through the 
sheath and I was able to navigate into the anterior tibial artery using 014 
angled quick cross catheter.  I was able to navigate wire all the way down to 
the distal anterior tibial artery.  Using CSI atherectomy device, atherectomy of
the anterior tibial artery was performed in its entirety. Then A 2.5 x 220 mm 
balloon was used to angioplasty the anterior tibial artery.  Post atherectomy 
angioplasty angiogram showed a solution of the multiple stenoses.
 
My attention was then taken to the peroneal artery and the posterior tibial 
artery.  These arteries were selected using 014 advantage wire.  I was able to 
advance the wire into the distal peroneal artery.  This artery was angioplasty 
with a 2.5 x 220 mm balloon.  Then I selected the posterior tibial artery with 
the same wire.  I was able to pass the wire all the way down to the distal 
posterior tibial artery.  Then a 2 x 220 mm balloon was used to angioplasty the 
entire SFA.  Angioplasty of the tibial arteries via the Ansell sheath showed 
much more brisk flow through the tibial and peroneal arteries.  With the same 
014 wire, the popliteal artery and the distal SFA lesions were angioplastied 
with a 5 x 40 mm balloon.  Post angioplasty angiogram showed resolution of the 
stenoses as well.
 
At this time, wires and catheters were removed.  The Ansell sheath was exchanged
with a short 6 Icelandic sheath.  The puncture site was closed using Mynx device.  
5 minutes of manual pressure was applied.  Then a sterile dressing was applied. 
The patient was awakened and taken to the PACU in stable condition.

## 2018-05-01 NOTE — RADIOLOGY REPORT
EXAMINATION:
CR LEFT LEG ARTERIOGRAM/INTRAOPERATIVE FLUOROSCOPY
 
CLINICAL INDICATION:
Left leg arteriogram. Tibial atherectomy. Popliteal plasty in OR.
 
COMPARISON:
CT runoff dated 04/30/2018.
 
TECHNIQUE/FINDINGS:
Fluoroscopic equipment was dedicated to the operating room for the
performance of an intraoperative procedure. Several  (12) spot films and 22
cine fluoroscopy runs were acquired and are archived in PACS. Please refer to
operative notes for procedural detail.
 
FLUOROSCOPY TIME:
25 minutes and 9 seconds.
 
IMPRESSION:
Administrative dictation for intraoperative fluoroscopy and image archiving
in PACS. Please refer to operative notes for details.

## 2018-05-02 VITALS — DIASTOLIC BLOOD PRESSURE: 82 MMHG | SYSTOLIC BLOOD PRESSURE: 138 MMHG

## 2018-05-02 VITALS — DIASTOLIC BLOOD PRESSURE: 64 MMHG | SYSTOLIC BLOOD PRESSURE: 122 MMHG

## 2018-05-02 VITALS — DIASTOLIC BLOOD PRESSURE: 68 MMHG | SYSTOLIC BLOOD PRESSURE: 106 MMHG

## 2018-05-02 VITALS — SYSTOLIC BLOOD PRESSURE: 130 MMHG | DIASTOLIC BLOOD PRESSURE: 88 MMHG

## 2018-05-02 LAB
ABSOLUTE GRANULOCYTE CT: 9.3 /CUMM (ref 1.4–6.5)
BASOPHILS # BLD: 0.1 /CUMM (ref 0–0.2)
BASOPHILS NFR BLD: 0.4 % (ref 0–2)
EOSINOPHIL # BLD: 0 /CUMM (ref 0–0.7)
EOSINOPHIL NFR BLD: 0.2 % (ref 0–5)
ERYTHROCYTE [DISTWIDTH] IN BLOOD BY AUTOMATED COUNT: 17.7 % (ref 11.5–14.5)
GRANULOCYTES NFR BLD: 79.8 % (ref 42.2–75.2)
HCT VFR BLD CALC: 37.3 % (ref 42–52)
LYMPHOCYTES # BLD: 1.1 /CUMM (ref 1.2–3.4)
MCH RBC QN AUTO: 26.4 PG (ref 27–31)
MCHC RBC AUTO-ENTMCNC: 31.3 G/DL (ref 33–37)
MCV RBC AUTO: 84.2 FL (ref 80–94)
MONOCYTES # BLD: 1.2 /CUMM (ref 0.1–0.6)
PLATELET # BLD: 290 /CUMM (ref 130–400)
PMV BLD AUTO: 9.6 FL (ref 7.4–10.4)
RED BLOOD CELL CT: 4.42 /CUMM (ref 4.7–6.1)
WBC # BLD AUTO: 11.6 /CUMM (ref 4.8–10.8)

## 2018-05-02 NOTE — PN- ATT ADDEND
Attending Addendum
Attending Brief Note
Patient seen and examined, came back from dialysis.  Claims that he's feeling 
very tired.  He is constipated from last more than 10 days but refuses to take 
any stool softeners.  He says that his norm is that he has a bowel movement 
every 2-3 weeks.  He denies any abdominal pain and he has a soft abdomen with 
positive bowel sounds.  He is passing Flatus. 
 
Vital Signs
 
 
Date Time Temp Pulse Resp B/P B/P Pulse O2 O2 Flow FiO2
 
     Mean Ox Delivery Rate 
 
05/02 1248 98.0 94 18 130/88  95 Room Air  
 
05/02 0800      96   
 
05/02 0610 97.8 95 20 122/64  96 Nasal 2.0L 
 
       Cannula  
 
05/01 2238 98.6 82 18 130/84  96   
 
05/01 1600       Nasal 2.0L 
 
       Cannula  
 
05/01 1505 97.7 74 18 112/58  98 Nasal 2.0L 
 
       Cannula  
 
 
on exam; 
aoxe, nad. 
cv; s1, s2, rrr
resp: clear. 
abd; soft, nt, bs+
ext: + ace warp on lle.
 
 Laboratory Tests
 
 
 05/02 05/02 05/02
 
 1000 0815 0600
 
Chemistry   
 
  Sodium (137 - 145 mmol/L)  139 Cancelled
 
  Potassium (3.5 - 5.1 mmol/L)  4.9 Cancelled
 
  Chloride (98 - 107 mmol/L)  94  L Cancelled
 
  Carbon Dioxide (22 - 30 mmol/L)  27 Cancelled
 
  Anion Gap (5 - 16)  18  H Cancelled
 
  BUN (9 - 20 mg/dL) Cancelled 67  H Cancelled
 
  Creatinine (0.7 - 1.2 mg/dL) Cancelled 9.7 *H Cancelled
 
  Estimated GFR (>60 ml/min)  6  L 
 
  BUN/Creatinine Ratio (7 - 25 %) Cancelled 6.9  L Cancelled
 
  Phosphorus (2.5 - 4.5 mg/dL)  6.1  H 
 
  Magnesium (1.6 - 2.3 mg/dL)  1.8 
 
Hematology   
 
  CBC w Diff  NO MAN DIFF REQ 
 
  WBC (4.8 - 10.8 /CUMM)  11.6  H 
 
  RBC (4.70 - 6.10 /CUMM)  4.42  L 
 
  Hgb (14.0 - 18.0 G/DL)  11.7  L 
 
  Hct (42 - 52 %)  37.3  L 
 
  MCV (80.0 - 94.0 FL)  84.2 
 
  MCH (27.0 - 31.0 PG)  26.4  L 
 
  MCHC (33.0 - 37.0 G/DL)  31.3  L 
 
  RDW (11.5 - 14.5 %)  17.7  H 
 
  Plt Count (130 - 400 /CUMM)  290 
 
  MPV (7.4 - 10.4 FL)  9.6 
 
  Gran % (42.2 - 75.2 %)  79.8  H 
 
  Lymphocytes % (20.5 - 51.1 %)  9.6  L 
 
  Monocytes % (1.7 - 9.3 %)  10.0  H 
 
  Eosinophils % (0 - 5 %)  0.2 
 
  Basophils % (0.0 - 2.0 %)  0.4 
 
  Absolute Granulocytes (1.4 - 6.5 /CUMM)  9.3  H 
 
  Absolute Lymphocytes (1.2 - 3.4 /CUMM)  1.1  L 
 
  Absolute Monocytes (0.10 - 0.60 /CUMM)  1.2  H 
 
  Absolute Eosinophils (0.0 - 0.7 /CUMM)  0 
 
  Absolute Basophils (0.0 - 0.2 /CUMM)  0.1 
 
 
 
 
 05/01
 
 1428
 
Chemistry 
 
  Sodium Cancelled
 
  Potassium Cancelled
 
  Chloride Cancelled
 
  Carbon Dioxide Cancelled
 
  Anion Gap Cancelled
 
  BUN Cancelled
 
  Creatinine Cancelled
 
  BUN/Creatinine Ratio Cancelled
 
 
A/P;  51 y/o M with pmh sig for HTN, history of collapsed left lung, GERD, ESRD 
on HD on MWF through left thigh catheter, HyperPTH, Hx of MSSA Bacteremia 
admitted with left foot osteomyelitis status post debridement.  Patient growing 
staph aureus.  Patient to be taken to operating room again tomorrow for further 
debridement and possible wound closure versus wound VAC.  Patient also underwent
angiogram left lower extremity as well as angioplasty with vascular surgery.
 
Antibiotics per infectious disease.  Patient has severe constipation but refuses
bowel regimen.  Patient underwent hemodialysis today.  Will be taken to or 
tomorrow.  Should be kept nothing by mouth after midnight tonight.  DVT px: 
Heparin subcutaneous.

## 2018-05-02 NOTE — PN- INFECT DX
Subjective
Subjective:
Afebrile.  He complains of pain in the left heel.
 
Objective
Last 24 Hrs of Vital Signs/I&O
 Vital Signs
 
 
Date Time Temp Pulse Resp B/P B/P Pulse O2 O2 Flow FiO2
 
     Mean Ox Delivery Rate 
 
05/02 1248 98.0 94 18 130/88  95 Room Air  
 
05/02 0800      96   
 
05/02 0610 97.8 95 20 122/64  96 Nasal 2.0L 
 
       Cannula  
 
05/01 2238 98.6 82 18 130/84  96   
 
05/01 1600       Nasal 2.0L 
 
       Cannula  
 
05/01 1505 97.7 74 18 112/58  98 Nasal 2.0L 
 
       Cannula  
 
 
 Intake & Output
 
 
 05/02 1600 05/02 0800 05/02 0000
 
Intake Total  400 700
 
Output Total  0 0
 
Balance  400 700
 
    
 
Intake, Oral  400 700
 
Number  0 0
 
Bowel   
 
Movements   
 
Output, Urine  0 0
 
Patient 177 lb 179 lb 
 
Weight   
 
 
 
 
Physical Exam
Other Physical Findings:
He appears comfortable in no acute distress
Lungs are clear
Heart regular rhythm with no murmur
Extremities left thigh fistula with no inflammation; left foot dressing intact
 
 
Results
Last 24 Hours of Lab Results:
 Laboratory Tests
 
 
 05/02 05/02 05/02
 
 1000 0815 0600
 
Chemistry   
 
  Sodium (137 - 145 mmol/L)  139 Cancelled
 
  Potassium (3.5 - 5.1 mmol/L)  4.9 Cancelled
 
  Chloride (98 - 107 mmol/L)  94  L Cancelled
 
  Carbon Dioxide (22 - 30 mmol/L)  27 Cancelled
 
  Anion Gap (5 - 16)  18  H Cancelled
 
  BUN (9 - 20 mg/dL) Cancelled 67  H Cancelled
 
  Creatinine (0.7 - 1.2 mg/dL) Cancelled 9.7 *H Cancelled
 
  Estimated GFR (>60 ml/min)  6  L 
 
  BUN/Creatinine Ratio (7 - 25 %) Cancelled 6.9  L Cancelled
 
  Phosphorus (2.5 - 4.5 mg/dL)  6.1  H 
 
  Magnesium (1.6 - 2.3 mg/dL)  1.8 
 
Hematology   
 
  CBC w Diff  NO MAN DIFF REQ 
 
  WBC (4.8 - 10.8 /CUMM)  11.6  H 
 
  RBC (4.70 - 6.10 /CUMM)  4.42  L 
 
  Hgb (14.0 - 18.0 G/DL)  11.7  L 
 
  Hct (42 - 52 %)  37.3  L 
 
  MCV (80.0 - 94.0 FL)  84.2 
 
  MCH (27.0 - 31.0 PG)  26.4  L 
 
  MCHC (33.0 - 37.0 G/DL)  31.3  L 
 
  RDW (11.5 - 14.5 %)  17.7  H 
 
  Plt Count (130 - 400 /CUMM)  290 
 
  MPV (7.4 - 10.4 FL)  9.6 
 
  Gran % (42.2 - 75.2 %)  79.8  H 
 
  Lymphocytes % (20.5 - 51.1 %)  9.6  L 
 
  Monocytes % (1.7 - 9.3 %)  10.0  H 
 
  Eosinophils % (0 - 5 %)  0.2 
 
  Basophils % (0.0 - 2.0 %)  0.4 
 
  Absolute Granulocytes (1.4 - 6.5 /CUMM)  9.3  H 
 
  Absolute Lymphocytes (1.2 - 3.4 /CUMM)  1.1  L 
 
  Absolute Monocytes (0.10 - 0.60 /CUMM)  1.2  H 
 
  Absolute Eosinophils (0.0 - 0.7 /CUMM)  0 
 
  Absolute Basophils (0.0 - 0.2 /CUMM)  0.1 
 
 
 
 
 05/01
 
 1428
 
Chemistry 
 
  Sodium Cancelled
 
  Potassium Cancelled
 
  Chloride Cancelled
 
  Carbon Dioxide Cancelled
 
  Anion Gap Cancelled
 
  BUN Cancelled
 
  Creatinine Cancelled
 
  BUN/Creatinine Ratio Cancelled
 
 
 
Last 24 Hours of Arnold Results:
OR cultures May 1 labeled left foot bone and left foot wound both positive for 
Staph aureus
 
 
Assessment/Plan ID
Impression:
Stable, with temperatures and white blood cell count remaining normal, off 
antibiotics status post debridement of necrotic bone from the left posterior 
calcaneus for osteomyelitis and angioplasty of multiple vessels of the left 
lower extremity with atherectomy of the left anterior tibial artery yesterday.  
His bone culture (and wound culture) from the OR are positive for Staph aureus; 
therefore he will need to be started on antibiotics.
 
Suggestion:
1.  Follow-up final OR cultures
2.  Await return to the OR later this week
3.  Vancomycin 1 g IV 1 today pending above

## 2018-05-02 NOTE — PN- NEPHROLOGY
Assessment/Plan Nephrology
Assessment:
Pt s/p angioplasty of leg and debridement of foot.   On dialysis now.  s/p 
debridement and bone biopsy. Growing Staph Aureus.  Await Dr. Hunt's 
recommendation.
Fidel Horner MD.
Suggestion:
.
 
 
Subjective
Subjective:
Pt s/p debridement of foot yesterday. on dialysis now.
 
Objective
Vital Signs and I&Os
M NAD
122/64   97.8   95
Skin neg rash
Lungs clear
Cor RRR
Abd soft
Ext tr edema foot bandaged
Current Medications:
 Current Medications
 
 
  Sig/Jabari Start time  Last
 
Medication Dose Route Stop Time Status Admin
 
Acetaminophen 650 MG .STK-MED ONE 05/01 2041 DC 
 
  PO 05/01 2042  
 
Acetaminophen 650 MG .STK-MED ONE 05/01 2041 DC 
 
  PO 05/01 2042  
 
Acetaminophen 650 MG Q6P PRN 04/26 2315 AC 05/01
 
  PO   2042
 
Bisacodyl 10 MG ONCE ONE 05/02 1015 DC 
 
  IN 05/02 1016  
 
Docusate Sodium 100 MG DAILY AS NEEDED PRN 04/28 1000 AC 
 
  PO   
 
Heparin Sodium  5,000 UNIT Q8 04/26 2200 AC 04/30
 
(Porcine)  SC   0628
 
Omeprazole 20 MG DAILY AC 04/27 0700 AC 05/02
 
  PO   0629
 
Omeprazole 20 MG DAILY AC 04/27 0700 DC 
 
  PO   
 
Oxycodone HCl 5 MG Q6H 04/26 2315 AC 05/02
 
  PO   0520
 
Oxycodone/ 2 TAB Q6P PRN 04/26 2315 AC 05/02
 
Acetaminophen  PO   0036
 
Patient Medication  1 ED ONE ONE 05/01 1815 DC 05/01
 
Teaching  ED 05/01 1816  1908
 
Polyethylene Glycol 17 GM DAILY 05/02 1015 AC 
 
  PO   
 
Polyethylene Glycol 17 GM DAILY 04/28 1000 AC 
 
  PO   
 
Tramadol HCl 50 MG ONCE ONE 05/02 0615 DC 05/02
 
  PO 05/02 0616  0630
 
 
 
 
Results
Pertinent Lab Results:
 
145 / 94 / 46 /
4.2  / 33 / 8.4

## 2018-05-02 NOTE — PN- HOUSESTAFF
Subjective
Follow-up For:
End-stage renal disease, left foot osteomyelitis, right heel ulcer
Complaints: no complaints
Subjective:
Patient seen and examined at bedside.  No overnight events.  Patient had 7 x 10 
pain in his left foot early morning.  Patient was given tramadol at around 6:00 
in the morning.  He says his pain is now controlled.  He denies shortness of 
breath/chest pain/nausea/vomiting.
 
Review of Systems
Constitutional:
Reports: no symptoms, see HPI. 
 
Objective
Last 24 Hrs of Vital Signs/I&O
 Vital Signs
 
 
Date Time Temp Pulse Resp B/P B/P Pulse O2 O2 Flow FiO2
 
     Mean Ox Delivery Rate 
 
 1248 98.0 94 18 130/88  95 Room Air  
 
 0800      96   
 
 0610 97.8 95 20 122/64  96 Nasal 2.0L 
 
       Cannula  
 
 2238 98.6 82 18 130/84  96   
 
 1600       Nasal 2.0L 
 
       Cannula  
 
 1505 97.7 74 18 112/58  98 Nasal 2.0L 
 
       Cannula  
 
 
 Intake & Output
 
 
  1600  0800  0000
 
Intake Total  400 700
 
Output Total  0 0
 
Balance  400 700
 
    
 
Intake, Oral  400 700
 
Number  0 0
 
Bowel   
 
Movements   
 
Output, Urine  0 0
 
Patient 177 lb 179 lb 
 
Weight   
 
 
 
 
Physical Exam
General Appearance: Alert, Oriented X3, Cooperative, No Acute Distress
Cardiovascular: Regular Rate, Normal S1, Normal S2, No Murmurs
Lungs: Normal Air Movement
Abdomen: Soft, No Tenderness, No Hepatospenomegaly
Neurological: Strength at 5/5 X4 Ext, Normal Tone, Sensation Intact, Cranial 
Nerves 3-12 NL
Extremities: No Edema, Normal Pulses
Current Medications:
 Current Medications
 
 
  Sig/Jabari Start time  Last
 
Medication Dose Route Stop Time Status Admin
 
Acetaminophen 650 MG .STK-MED ONE 2041 DC 
 
  PO 2042  
 
Acetaminophen 650 MG .STK-MED ONE 2041 DC 
 
  PO 2042  
 
Acetaminophen 650 MG Q6P PRN  2315 AC 
 
  PO   
 
Bisacodyl 10 MG ONCE ONE  1015 DC 
 
  VT  1016  
 
Docusate Sodium 100 MG DAILY AS NEEDED PRN  1000 AC 
 
  PO   
 
Heparin Sodium  5,000 UNIT Q8  2200 AC 
 
(Porcine)  SC   0628
 
Omeprazole 20 MG DAILY AC  0700 AC 
 
  PO   0629
 
Omeprazole 20 MG DAILY AC  0700 DC 
 
  PO   
 
Oxycodone HCl 5 MG Q6H  2315 AC 
 
  PO   1104
 
Oxycodone/ 2 TAB Q6P PRN  2315  
 
Acetaminophen  PO   0036
 
Patient Medication  1 ED ONE ONE  1815 DC 
 
Teaching  ED  181  1908
 
Polyethylene Glycol 17 GM DAILY  1015 AC 
 
  PO   
 
Polyethylene Glycol 17 GM DAILY  1000 DC 
 
  PO   
 
Tramadol HCl 50 MG ONCE ONE  0615 DC 
 
  PO  0616  0630
 
Vancomycin HCl 1,000 MG ONCE ONE  1415 AC 
 
Sodium Chloride 250 ML IV  1514  
 
 
 
 
Last 24 Hrs of Lab/Arnold Results
Last 24 Hrs of Labs/Mics:
 Laboratory Tests
 
18 1000:
BUN Cancelled, Creatinine Cancelled, BUN/Creatinine Ratio Cancelled
 
18 0815:
Anion Gap 18  H, Estimated GFR 6  L, BUN/Creatinine Ratio 6.9  L, Phosphorus 6.1
 H, Magnesium 1.8, CBC w Diff NO MAN DIFF REQ, RBC 4.42  L, MCV 84.2, MCH 26.4  
L, MCHC 31.3  L, RDW 17.7  H, MPV 9.6, Gran % 79.8  H, Lymphocytes % 9.6  L, 
Monocytes % 10.0  H, Eosinophils % 0.2, Basophils % 0.4, Absolute Granulocytes 
9.3  H, Absolute Lymphocytes 1.1  L, Absolute Monocytes 1.2  H, Absolute 
Eosinophils 0, Absolute Basophils 0.1
 
18 0600:
Sodium Cancelled, Potassium Cancelled, Chloride Cancelled, Carbon Dioxide 
Cancelled, Anion Gap Cancelled, BUN Cancelled, Creatinine Cancelled, BUN/
Creatinine Ratio Cancelled
 
18 1428:
Sodium Cancelled, Potassium Cancelled, Chloride Cancelled, Carbon Dioxide 
Cancelled, Anion Gap Cancelled, BUN Cancelled, Creatinine Cancelled, BUN/
Creatinine Ratio Cancelled
 
 
Assessment/Plan
Assessment:
Mr. Pérez is a 53yo M w/ PMH of HTN, history of collapsed left lung, GERD, ESRD 
on HD on MWF through left thigh catheter, HyperPTH, Hx of MSSA Bacteremia in 
. Patient was brought in by ambulance from home for bilateral foot pain and 
inability to walk. 
 
Assessment and plan:
 
1.  Left foot osteomyelitis-patient denied MRI due to claustrophobia.  Patient 
had a CTA runoff which right mild popliteal disease, 50% stenosis at the 
adductor canal and left SFA with 50% narrowing in the popliteal artery on the 
left side.  Patient was seen by Dr. Meier who did a angiogram in nature 
plasty yesterday. 
2.  Right foot ulcer with necrosis -   had for debridement yesterday.  Patient's
over cultures growing staph aureus.  Infectious disease on board.  Suggested to 
start him on vancomycin 1 g IV once today.  We will follow final wall cultures. 
3.  End-stage renal disease-patient is on hemodialysis on  and 
Friday.  He missed his past to dialysis due to weakness.  Nephrology on board.  
Patient is going for dialysis today.  Patient refused renal diet and upon 
further counseling and education patient agreed to be on renal diet for now.  He
feels that his renal diet is not helping him much.
4.  Weakness and gait instability-physical therapy consult placed.  Seen by 
physical therapy as suggested short-term rehabilitation.  According to podiatry 
he can weight bear.
5.  Continue rest of his home medication.
 
 
Patient will be nothing by mouth from [facility] for revision surgery tomorrow.  
Patient agrees with the plan.
Problem List:
 1. ESRD (end stage renal disease)
 
 2. Osteomyelitis of ankle or foot, left, acute
 
Pain Ratin
Pain Location:
none
Pain Goal: Remain pain free
Pain Plan:
tylenol
Tomorrow's Labs & Rationales:
cbc,bep

## 2018-05-03 VITALS — DIASTOLIC BLOOD PRESSURE: 72 MMHG | SYSTOLIC BLOOD PRESSURE: 118 MMHG

## 2018-05-03 VITALS — DIASTOLIC BLOOD PRESSURE: 68 MMHG | SYSTOLIC BLOOD PRESSURE: 110 MMHG

## 2018-05-03 VITALS — SYSTOLIC BLOOD PRESSURE: 100 MMHG | DIASTOLIC BLOOD PRESSURE: 60 MMHG

## 2018-05-03 VITALS — SYSTOLIC BLOOD PRESSURE: 112 MMHG | DIASTOLIC BLOOD PRESSURE: 82 MMHG

## 2018-05-03 VITALS — DIASTOLIC BLOOD PRESSURE: 66 MMHG | SYSTOLIC BLOOD PRESSURE: 110 MMHG

## 2018-05-03 PROCEDURE — 0JBR0ZZ EXCISION OF LEFT FOOT SUBCUTANEOUS TISSUE AND FASCIA, OPEN APPROACH: ICD-10-PCS | Performed by: PODIATRIST

## 2018-05-03 PROCEDURE — 3E0T3BZ INTRODUCTION OF ANESTHETIC AGENT INTO PERIPHERAL NERVES AND PLEXI, PERCUTANEOUS APPROACH: ICD-10-PCS | Performed by: PODIATRIST

## 2018-05-03 PROCEDURE — 0QBP0ZZ EXCISION OF LEFT METATARSAL, OPEN APPROACH: ICD-10-PCS | Performed by: PODIATRIST

## 2018-05-03 PROCEDURE — 2W1TX6Z COMPRESSION OF LEFT FOOT USING PRESSURE DRESSING: ICD-10-PCS | Performed by: PODIATRIST

## 2018-05-03 NOTE — PN- NEPHROLOGY
Assessment/Plan Nephrology
Assessment:
Osteomyelitis.  On Vanco pending sensitivities.  Dialysis again tomorrow.
Fidel Horner MD
Suggestion:
.
 
 
Subjective
Subjective:
Dr. Hunt's input appreciated. pt started on Vanco
 
Objective
Vital Signs and I&Os
 
M NAD
Lungs clear
Cor RRR
Abd soft
Ext neg edema foot bandaged
 
 
Results
Pertinent Lab Results:
.

## 2018-05-03 NOTE — PN- HOUSESTAFF
Rocio BEDOLLA,Annemarie 18 0709:
Subjective
Follow-up For:
ESRD,OSTEOMYELITIS
Complaints: no complaints
 
Review of Systems
Constitutional:
Reports: no symptoms, see HPI. 
 
Objective
Last 24 Hrs of Vital Signs/I&O
 Vital Signs
 
 
Date Time Temp Pulse Resp B/P B/P Pulse O2 O2 Flow FiO2
 
     Mean Ox Delivery Rate 
 
 0647 97.9 98 18 110/66  94   
 
 2237 98.2 64 19 106/68  92 Room Air  
 
 1600 99.1 80 20 138/82  95 Room Air  
 
 1248 98.0 94 18 130/88  95 Room Air  
 
 
 Intake & Output
 
 
  1600  0800  0000
 
Intake Total   250
 
Output Total   
 
Balance   250
 
    
 
Intake, Oral   250
 
Number   0
 
Bowel   
 
Movements   
 
Patient  180 lb 
 
Weight   
 
 
 
 
Physical Exam
General Appearance: Alert, Oriented X3, Cooperative, No Acute Distress
Cardiovascular: Regular Rate, Normal S1, Normal S2, No Murmurs
Lungs: Clear to Auscultation
Abdomen: Soft, No Tenderness, No Hepatospenomegaly
Neurological: Normal Speech, Strength at 5/5 X4 Ext, Normal Tone, Sensation 
Intact
Current Medications:
 Current Medications
 
 
  Sig/Jabari Start time  Last
 
Medication Dose Route Stop Time Status Admin
 
Acetaminophen 650 MG Q6P PRN  2315 AC 
 
  PO   2042
 
Bisacodyl 10 MG ONCE ONE  1015 DC 
 
  TX  1016  
 
Docusate Sodium 100 MG DAILY AS NEEDED PRN  1000 AC 
 
  PO   
 
Heparin Sodium  5,000 UNIT Q8  2200 AC 
 
(Porcine)  SC   0628
 
Omeprazole 20 MG DAILY AC  0700 AC 
 
  PO   0629
 
Oxycodone HCl 5 MG Q6H  2315 AC 
 
  PO   1104
 
Oxycodone/ 2 TAB Q6P PRN  2315 AC 
 
Acetaminophen  PO   0036
 
Polyethylene Glycol 17 GM DAILY  1015 AC 
 
  PO   
 
Polyethylene Glycol 17 GM DAILY  1000 DC 
 
  PO   
 
Vancomycin HCl 1,000 MG ONCE ONE  1415 DC 
 
Sodium Chloride 250 ML IV  1514  1445
 
 
 
 
Last 24 Hrs of Lab/Arnold Results
Last 24 Hrs of Labs/Mics:
 Laboratory Tests
 
18 1200:
Estimated GFR 21  L, BUN/Creatinine Ratio 5.0  L
 
 
Assessment/Plan
Assessment:
Mr. Pérez is a 53yo M w/ PMH of HTN, history of collapsed left lung, GERD, ESRD 
on HD on MWF through left thigh catheter, HyperPTH, Hx of MSSA Bacteremia in 
. Patient was brought in by ambulance from home for bilateral foot pain and 
inability to walk. 
 
Assessment and plan:
 
1.  Left foot osteomyelitis-patient denied MRI due to claustrophobia.  Patient 
had a CTA runoff which right mild popliteal disease, 50% stenosis at the 
adductor canal and left SFA with 50% narrowing in the popliteal artery on the 
left side.  Patient was seen by Dr. Meier who did a angiogram and 
angioplasty .  Patient is going for revision surgery today.
 
2.  Right foot ulcer with necrosis -   had for debridement and is going for 
revision surgery today.  Patient's or cultures growing staph aureus.  Infectious
disease on board.  Suggested to start him on vancomycin 1 g IV once today.  We 
will follow final cultures. 
 
3.  End-stage renal disease-patient is on hemodialysis on  and 
Friday.  He missed his past to dialysis due to weakness.  Nephrology on board.  
Patient is going for dialysis tomorrow.  Patient refused renal diet and upon 
further counseling and education patient agreed to be on renal diet for now.  He
feels that his renal diet is not helping him much.
 
4.  Weakness and gait instability-physical therapy consult placed.  Seen by 
physical therapy as suggested short-term rehabilitation.  According to podiatry 
he can weight bear.
 
5.  Continue rest of his home medication.
 
 
[facility]  Patient 
agrees with the plan.
Problem List:
 1. ESRD (end stage renal disease)
 
 2. Osteomyelitis of ankle or foot, left, acute
 
Pain Ratin
Pain Location:
none
Pain Goal: Remain pain free
Pain Plan:
tylenol
Tomorrow's Labs & Rationales:
none
 
 
Karey Buchanan MD 18 1408:
Attending MD Review Statement
 
Attending Statement
Attending MD Statement: examined this patient, discuss w/resident/PA/NP, agreed 
w/resident/PA/NP, reviewed EMR data (avail), discussed with nursing, discussed 
with case mgmt, reviewed images, amended to note
Attending Assessment/Plan:
Patient seen and examined, offers no complaints.  He has been constipated 
refusing to take anything for constipation.  He refuses stool softeners and 
suppository.  Patient is scheduled to go to or again with Dr. Crocker today.  
His antibiotics will be decided as per infectious disease.  He is growing staph 
aureus and his wound culture.  Which is MSSA.  Hemodialysis per nephrology.  
After his debridement, we need to discuss with infectious disease about the 
choice of antibiotics.  He needs to go to rehabilitation and has a bed 
available.  He can likely be discharged to rehabilitation tomorrow if all goes 
well.

## 2018-05-03 NOTE — PN- INFECT DX
Subjective
Subjective:
Afebrile.  He feels well with no pain in the left foot.
 
Objective
Last 24 Hrs of Vital Signs/I&O
 Vital Signs
 
 
Date Time Temp Pulse Resp B/P B/P Pulse O2 O2 Flow FiO2
 
     Mean Ox Delivery Rate 
 
05/03 0647 97.9 98 18 110/66  94   
 
05/02 2237 98.2 64 19 106/68  92 Room Air  
 
05/02 1600 99.1 80 20 138/82  95 Room Air  
 
 
 Intake & Output
 
 
 05/03 1600 05/03 0800 05/03 0000
 
Intake Total 0  250
 
Output Total 0  
 
Balance 0  250
 
    
 
Intake, IV 0  
 
Intake, Oral 0  250
 
Number 0  0
 
Bowel   
 
Movements   
 
Output, Urine 0  
 
Patient  180 lb 
 
Weight   
 
 
 
 
Physical Exam
Other Physical Findings:
He appears comfortable in no acute distress
Extremities left foot dressing intact; left middle finger with a ulcer on the 
proximal aspect with no surrounding inflammation
 
 
Results
Last 24 Hours of Lab Results:
No labs from today
 
Last 24 Hours of Arnold Results:
OR cultures May 1 labeled left foot bone and left foot wound positive for Staph 
aureus sensitive to Oxacillin
 
 
Assessment/Plan ID
Impression:
Stable, with temperatures remaining normal, but  with a mild leukocytosis on 
yesterday's CBC, status post 1 dose of Vancomycin yesterday for osteomyelitis of
the left posterior calcaneus, status post debridement of necrotic bone and 
angioplasty of multiple vessels of the left lower extremity with atherectomy of 
the left anterior tibial artery 2 days ago, with plans for a return to the OR 
today for further debridement.  His bone and wound cultures from the OR are both
positive for methicillin sensitive Staph aureus; therefore his antibiotics can 
be adjusted.  Suspect he will require a prolonged course of IV antibiotics for 
residual osteomyelitis and will discuss this further with Podiatry.
 
Suggestion:
1.  Await return to the OR later today
2.  Would obtain a postop baseline ESR and x-ray of the left heel in the a.m.
2.  Begin Cefazolin 1 g IV after dialysis on May 4 and continue 1 g IV after 
each dialysis

## 2018-05-03 NOTE — OPERATIVE REPORT
Operative/Inv Procedure Report
Surgery Date: 05/03/18
Name of Procedure:
1 incision and drainage deep to the deep fascia with exposure of the flexor 
tendon and tendon sheath multiple sites left ankle
2 debridement of necrotic bone left heel
3 intraoperative administration of negative pressure wound therapy
4 intraoperative measures of ankle block anesthesia
5 excisional debridement
Pre-Operative Diagnosis:
1 open necrotic wound left ankle
2 osteomyelitis left heel
3 severe peripheral arterial disease
Post-Operative Diagnosis:
The same
Estimated Blood Loss: less than 50ml
Surgeon/Assistant:
DAIJA WHITE DPM
Anesthesia: moderate sedation, block
 
Operative/Procedure Note
Note:
After obtaining informed consent, the patient was brought to the operating room 
and placed on the operating table in the prone position.  The patient was then 
securely fastened to the operating table utilizing safety belt.  After 
administration of IV sedation, 10 mL of 0.5% Marcaine plain was obtained about 
the patient's left ankle.  The left foot and ankle within scrubbed, prepped and 
draped in usual aseptic manner.  Attention was then directed to the posterior 
left heel, where a large full-thickness necrotic was identified.  A 15 blade was
utilized sharply revised skin margins.  Dissection was then carried down deep to
the deep fascia with exposure of the flexor tendon and tendon sheath multiple 
sites, both proximally and distally.  All necrotic nonviable infected tissue 
sharply evacuated from the wound bed.  The distal insertion of the Achilles 
tendon was reflected off the posterior margin of the calcaneus, which was then 
resected with sagittal bone saw.  Specimen was sent for pathologic inspection.  
The open wound was then irrigated with 3 L of normal sterile saline infusion 50,
000 units of bacitracin.  Following this, the foot was redraped and the surgeon'
s top gloves were changed clean gloves.  Any bleeding vessels identified were 
cauterized or ligated as encountered.  Next, negative pressure wound therapy was
placed followed by the application of Kerlix and an Ace wrap.  The patient was 
noted to tolerate both procedure and anesthesia well and the patient was 
transported from the operating room to recovery with vital signs stable best 
assess intact to all digits left foot.

## 2018-05-04 VITALS — SYSTOLIC BLOOD PRESSURE: 128 MMHG | DIASTOLIC BLOOD PRESSURE: 62 MMHG

## 2018-05-04 VITALS — SYSTOLIC BLOOD PRESSURE: 108 MMHG | DIASTOLIC BLOOD PRESSURE: 60 MMHG

## 2018-05-04 VITALS — SYSTOLIC BLOOD PRESSURE: 114 MMHG | DIASTOLIC BLOOD PRESSURE: 83 MMHG

## 2018-05-04 VITALS — SYSTOLIC BLOOD PRESSURE: 100 MMHG | DIASTOLIC BLOOD PRESSURE: 68 MMHG

## 2018-05-04 LAB
ABSOLUTE GRANULOCYTE CT: 10 /CUMM (ref 1.4–6.5)
BASOPHILS # BLD: 0 /CUMM (ref 0–0.2)
BASOPHILS NFR BLD: 0.3 % (ref 0–2)
EOSINOPHIL # BLD: 0.1 /CUMM (ref 0–0.7)
EOSINOPHIL NFR BLD: 0.5 % (ref 0–5)
ERYTHROCYTE [DISTWIDTH] IN BLOOD BY AUTOMATED COUNT: 17.1 % (ref 11.5–14.5)
GRANULOCYTES NFR BLD: 80.2 % (ref 42.2–75.2)
HCT VFR BLD CALC: 34.1 % (ref 42–52)
LYMPHOCYTES # BLD: 1.2 /CUMM (ref 1.2–3.4)
MCH RBC QN AUTO: 26.8 PG (ref 27–31)
MCHC RBC AUTO-ENTMCNC: 32.1 G/DL (ref 33–37)
MCV RBC AUTO: 83.4 FL (ref 80–94)
MONOCYTES # BLD: 1.2 /CUMM (ref 0.1–0.6)
PLATELET # BLD: 270 /CUMM (ref 130–400)
PMV BLD AUTO: 9.6 FL (ref 7.4–10.4)
RED BLOOD CELL CT: 4.09 /CUMM (ref 4.7–6.1)
WBC # BLD AUTO: 12.5 /CUMM (ref 4.8–10.8)

## 2018-05-04 NOTE — CONS- CARDIOLOGY
General Information and HPI
 
Consulting Request
Date of Consult: 05/04/18
Requested By:
Dewayne BEDOLLA,Karey
 
Reason for Consult:
tachycardia during dialysis
History of Present Illness:
Patient admitted for osteomyelitis of the left foot, in the context of 
established, progressive vascular disease.
During his hemodialysis treatment, an irregular heart rate was observed along 
with multiple PVC and PAC. Patient was entirely asymptomatic and upon review of 
his previous EKG, this is not new.
In addition, mr Pérez does not experience any chest pains, denies shortness of 
breath with his regular activities (although his functional capacity is greatly 
reduced due to pain in the lower extremities), denies orthopnea.
He has never smoked. 
He is unfortunately plagued with a very aggressive vasculopathy complicated by 
ESRD, claudication, lower extremity wound etc
In addition, he has a diagnosis of pulmonary fibrosis.
 
Allergies/Medications
Allergies:
Coded Allergies:
Iodinated Contrast- Oral and IV Dye (IODINATED CONTRAST MEDIA - IV DYE) (RASH 03
/19/18)
 
Home Med List:
Aspirin (Ecotrin*) 81 MG TABLET.DR   81 MG PO DAILY Heart
Atorvastatin Calcium 40 MG TABLET   40 MG PO 1700 Cholesterol
Cefazolin Sodium 1 GRAM VIAL   1,000 MG IV SEE ADMIN CRITERIA osteomyelitis
Omeprazole 20 MG CAPSULE.DR   1 CAP PO DAILY GI  (Reported)
Oxycodone HCl/Acetaminophen (Percocet 5-325 MG Tablet) 5 MG-325 MG TABLET   2 
TAB PO Q6P PRN PAIN SCALE 7-10 (SEVERE)
 
Current Medications:
 Current Medications
 
 
  Sig/Jabari Start time  Last
 
Medication Dose Route Stop Time Status Admin
 
Acetaminophen 650 MG Q6P PRN 04/26 2315 AC 05/01
 
  PO   2042
 
Cefazolin Sodium 1,000 MG SEE ADMIN CRITERIA 05/04 1800 AC 
 
  IV   
 
Docusate Sodium 100 MG DAILY AS NEEDED PRN 04/28 1000 AC 
 
  PO   
 
Heparin Sodium  5,000 UNIT Q8 04/26 2200 AC 04/30
 
(Porcine)  SC   0628
 
Omeprazole 20 MG DAILY AC 04/27 0700 AC 05/04
 
  PO   0559
 
Oxycodone HCl 5 MG Q6H 04/26 2315 DC 05/02
 
  PO   1104
 
Oxycodone/ 2 TAB Q6P PRN 05/04 0245 AC 05/04
 
Acetaminophen  PO   0944
 
Oxycodone/ 2 TAB Q6P PRN 04/26 2315 DC 05/02
 
Acetaminophen  PO   0036
 
Polyethylene Glycol 17 GM DAILY 05/02 1015 AC 
 
  PO   
 
 
 
 
Review of Systems
 
Review of Systems
Constitutional:
Denies: see HPI. 
 
Past History
 
Travel History
Traveled to [place] past 21 day No
 
Medical History
Neurological: migraine
EENT: NONE
Cardiovascular: hypertension
Respiratory: collapsed left lung with a persistent left pleural effusion.
Gastrointestinal: GERD, GI BLEED
Hepatic: NONE
Renal: ESRD on HD, DIALYSIS M-W-F ACCESS L LEG
Musculoskeletal: gout, osteoarthritis, secondary OSTEOPOROSIS GANGREEN L 2ND 
FINGER
Psychiatric: NONE
Endocrine: hyperparathyroidism
Blood Disorders: NONE
Cancer(s): NONE
GYN/Reproductive: NONE
Other Medical Hx:
In 2004 he had an infected abscess with MSSA bacteremia.
 Complete central venous occlusion in 2007.
 
Surgical History
Surgical History: GRAFT, LEFT THIGH s/p placement of numerous August catheters/also
dual-lumen Amor's left radial cephalic primary AVF on 03/28/2000 left upper 
arm AVG Tenckhoff catheter placement and subsequent removal status post 
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
 
Family History
Relations & Conditions If Any:
MOTHER (Heart Dz, Breast Ca).
FATHER (Diabetes).
Relation not specified for:
  FH: stroke
 
 
Psychosocial History
Where Do You Live? Home
Who Do You Live With? self
Services at Home: Nursing
Primary Language: English
Smoking Status: Former Smoker
ETOH Use: denies use
Illicit Drug Use: denies illicit drug use
 
Functional Ability
ADLs
Independent: dressing, eating. 
Ambulation: cane
IADLs
Independent: shopping, housework, finances. 
 
Exam & Diagnostic Data
Vital Signs and I&O
Vital Signs
 
 
Date Time Temp Pulse Resp B/P B/P Pulse O2 O2 Flow FiO2
 
     Mean Ox Delivery Rate 
 
05/04 1249 98.0 98 18 128/62  96 Room Air  
 
05/04 1112       Room Air  
 
05/04 0656 97.8 96 20 114/83  95   
 
05/03 2238 98.2 54 20 118/72  97   
 
05/03 2004 98.1 96 20 110/68  98   
 
05/03 1759 98.0 98 20 112/82  92   
 
05/03 1605 98.2 98 18 100/60  96 Room Air  
 
 
 Intake & Output
 
 
 05/04 1600 05/04 0800 05/04 0000 05/03 1600 05/03 0800 05/03 0000
 
Intake Total  120 210 0  250
 
Output Total   0 0  
 
Balance  120 210 0  250
 
       
 
Intake, IV   10 0  
 
Intake, Oral  120 200 0  250
 
Number   0 0  0
 
Bowel      
 
Movements      
 
Output, Urine   0 0  
 
Patient 175 lb 177 lb   180 lb 
 
Weight      
 
Weight  Bed scale    
 
Measurement      
 
Method      
 
 
 
 
Physical Exam
General Appearance: no apparent distress, alert, awake
Head: atraumatic, normal appearance
Neck: normal inspection (no JVD), supple, trachea mid line
Respiratory: no respiratory distress, lungs clear
Cardiovascular: murmur (systolic ejection murmur 1/6 ), irregularly irregular
Gastrointestinal: normal bowel sounds, soft, non-tender, no organomegaly
Extremities: normal inspection (mild edema), slow capillary refill
Labs/Arnold Results:
 Laboratory Tests
 
 
 05/04 05/04
 
 0910 0750
 
Chemistry  
 
  Sodium (137 - 145 mmol/L)  141
 
  Potassium (3.5 - 5.1 mmol/L)  4.4
 
  Chloride (98 - 107 mmol/L)  100
 
  Carbon Dioxide (22 - 30 mmol/L)  25
 
  Anion Gap (5 - 16)  16
 
  BUN (9 - 20 mg/dL)  41  H
 
  Creatinine (0.7 - 1.2 mg/dL)  8.6 *H
 
  Estimated GFR (>60 ml/min)  7  L
 
  BUN/Creatinine Ratio (7 - 25 %)  4.8  L
 
  Glucose (65 - 99 mg/dL)  95
 
  Calcium (8.4 - 10.2 mg/dL)  8.3  L
 
Hematology  
 
  CBC w Diff  NO MAN DIFF REQ
 
  WBC (4.8 - 10.8 /CUMM)  12.5  H
 
  RBC (4.70 - 6.10 /CUMM)  4.09  L
 
  Hgb (14.0 - 18.0 G/DL)  11.0  L
 
  Hct (42 - 52 %)  34.1  L
 
  MCV (80.0 - 94.0 FL)  83.4
 
  MCH (27.0 - 31.0 PG)  26.8  L
 
  MCHC (33.0 - 37.0 G/DL)  32.1  L
 
  RDW (11.5 - 14.5 %)  17.1  H
 
  Plt Count (130 - 400 /CUMM)  270
 
  MPV (7.4 - 10.4 FL)  9.6
 
  Gran % (42.2 - 75.2 %)  80.2  H
 
  Lymphocytes % (20.5 - 51.1 %)  9.6  L
 
  Monocytes % (1.7 - 9.3 %)  9.4  H
 
  Eosinophils % (0 - 5 %)  0.5
 
  Basophils % (0.0 - 2.0 %)  0.3
 
  Absolute Granulocytes (1.4 - 6.5 /CUMM)  10.0  H
 
  Absolute Lymphocytes (1.2 - 3.4 /CUMM)  1.2
 
  Absolute Monocytes (0.10 - 0.60 /CUMM)  1.2  H
 
  Absolute Eosinophils (0.0 - 0.7 /CUMM)  0.1
 
  Absolute Basophils (0.0 - 0.2 /CUMM)  0
 
  ESR Westergren (0 - 10 MM) 85  H 
 
 
 
 
Assessment/Plan
Assessment/Plan
Patient with severe vasculopathy and ESRD admitted for osteomyelitis of the left
foot after failure of reperfusion.
Frequent PVCs and PACs on monitoring, asymptomatic, without clinical evidence of
unstable coronary or arrhythmic process.
From my standpoint, patient could be discharged to [facility] once VAC is installed, 
and should have a holter/echocardiogram/regadenosine nuclear stress as 
outpatient (i could gladly see him). 
 
 
Consult Acknowledgment
- Thank you for your consult request.

## 2018-05-04 NOTE — PATIENT DISCHARGE INSTRUCTIONS
Discharge Instructions
 
General Discharge Information
You were seen/treated for:
Osteomyelitis, right heel ulcer with necrosis
Watch for these problems:
In case of pain both foot, numbness, tingling sensation, shortness of breath 
please go to nearest emergency room.
 
WEEKLY ESR
 
STRESS TEST OUT PT WITH Dr. SCHMIDT
Special Instructions:
Please follow-up with your primary care provider/podiatry/nephrology/cardiology 
within 1-2 weeks of discharge.
 
please dose cefazolin after each dialysis. Last dose on 6/1/18.
 
Diet
Continue normal diet: No
Recommended Diet: Renal Dialysis
 
Activity
Full Activity/No Limits: No
Activity Self Limited: Yes
 
Acute Coronary Syndrome
 
Inclusion Criteria
At DC or during hospital stay patient has or had the following:
ACS DIAGNOSIS No
 
Discharge Core Measures
Meds if any: Prescribed or Continued at Discharge
Meds if any: NOT Prescribed or Continued at Discharge
 
Congestive Heart Failure
 
Inclusion Criteria
At DC or during hospital stay patient has or had the following:
CHF DIAGNOSIS No
 
Discharge Core Measures
Meds if any: Prescribed or Continued at Discharge
Meds if any: NOT Prescribed or Continued at Discharge
 
Cerebrovascular accident
 
Inclusion Criteria
At DC or during hospital stay patient has or had the following:
CVA/TIA Diagnosis No
 
Discharge Core Measures
Meds if any: Prescribed or Continued at Discharge
Meds if any: NOT Prescribed or Continued at Discharge
 
Venous thromboembolism
 
Inclusion Criteria
VTE Diagnosis No
VTE Type NONE
VTE Confirmed by (Test) NONE
 
Discharge Core Measures
- Per Current guidelines, there needs to be overlap
- treatment for the first 5 days of Warfarin therapy.
- If discharged on Warfarin prior to 5 days of
- overlap therapy, the patient will need to be
- assessed for post discharge needs including
- *Post discharge parental anticoagulation
- *Warfarin and/or parental anticoagulation education
- *Follow up date to check INR post discharge
At least 5 days overlap therapy as Inpatient No
Meds if any: Prescribed or Continued at Discharge
Note: Overlap Therapy is Warfarin and Anticoagulant
Meds if any: NOT Prescribed or Continued at Discharge

## 2018-05-04 NOTE — RADIOLOGY REPORT
EXAMINATION:
XR FOOT, LEFT
 
CLINICAL INFORMATION:
4 new baseline. Status post debridement for osteomyelitis. Postop left heel
x-ray.
 
COMPARISON:
Left foot films dated 04/26/2018 and the left calcaneus films from
03/19/2018.
 
TECHNIQUE:
AP, lateral, and oblique views of the left foot.
 
FINDINGS:
The patient has undergone debridement of the posterior superior calcaneus for
osteomyelitis. A soft tissue drain is seen in place posteriorly. There is an
open wound and associated dressing/packing material in place, which obscures
assessment. The underlying posterior calcaneal cortex appears sharp with no
definite residual lytic lesion seen. Prominent degenerative changes are seen
in the intertarsal joints with slight flattening of the plantar arch. There
is ossification seen along the plantar surface of the hindfoot, unchanged.
Extensive arteriovascular calcifications are noted.
 
IMPRESSION:
Postoperative changes are seen status post posterior calcaneal debridement
with the calcaneal margin now appearing sharp with no definite residual lytic
lesion seen.

## 2018-05-04 NOTE — PN- NEPHROLOGY
Assessment/Plan Nephrology
Assessment:
ESRD on dialysis now.  Pt comfortable.  Will add heparin to his regimen starting
with next Rx.  Will monitor venous pressures. If remains high may at some point 
need fistulogram (can be done as outpt).
Fidel Jewell
Suggestion:
.
 
 
Subjective
Subjective:
Patient on dialysis now. Venous pressures high. needles changed. 
 
Objective
Vital Signs and I&Os
M NAD
VSS
Lungs clear
Cor RRR
Abd soft
Ext foot bandaged
 
Results
Pertinent Lab Results:
 Laboratory Tests
 
 
 05/04 05/04 05/02
 
 0910 0750 1200
 
Chemistry   
 
  Sodium (137 - 145 mmol/L)  141 
 
  Potassium (3.5 - 5.1 mmol/L)  4.4 
 
  Chloride (98 - 107 mmol/L)  100 
 
  Carbon Dioxide (22 - 30 mmol/L)  25 
 
  Anion Gap (5 - 16)  16 
 
  BUN (9 - 20 mg/dL)  41  H 16
 
  Creatinine (0.7 - 1.2 mg/dL)  8.6 *H 3.2  H
 
  Estimated GFR (>60 ml/min)  7  L 21  L
 
  BUN/Creatinine Ratio (7 - 25 %)  4.8  L 5.0  L
 
  Glucose (65 - 99 mg/dL)  95 
 
  Calcium (8.4 - 10.2 mg/dL)  8.3  L 
 
Hematology   
 
  CBC w Diff  NO MAN DIFF REQ 
 
  WBC (4.8 - 10.8 /CUMM)  12.5  H 
 
  RBC (4.70 - 6.10 /CUMM)  4.09  L 
 
  Hgb (14.0 - 18.0 G/DL)  11.0  L 
 
  Hct (42 - 52 %)  34.1  L 
 
  MCV (80.0 - 94.0 FL)  83.4 
 
  MCH (27.0 - 31.0 PG)  26.8  L 
 
  MCHC (33.0 - 37.0 G/DL)  32.1  L 
 
  RDW (11.5 - 14.5 %)  17.1  H 
 
  Plt Count (130 - 400 /CUMM)  270 
 
  MPV (7.4 - 10.4 FL)  9.6 
 
  Gran % (42.2 - 75.2 %)  80.2  H 
 
  Lymphocytes % (20.5 - 51.1 %)  9.6  L 
 
  Monocytes % (1.7 - 9.3 %)  9.4  H 
 
  Eosinophils % (0 - 5 %)  0.5 
 
  Basophils % (0.0 - 2.0 %)  0.3 
 
  Absolute Granulocytes (1.4 - 6.5 /CUMM)  10.0  H 
 
  Absolute Lymphocytes (1.2 - 3.4 /CUMM)  1.2 
 
  Absolute Monocytes (0.10 - 0.60 /CUMM)  1.2  H 
 
  Absolute Eosinophils (0.0 - 0.7 /CUMM)  0.1 
 
  Absolute Basophils (0.0 - 0.2 /CUMM)  0 
 
  ESR Westergren (0 - 10 MM) 85  H  
 
 
 
 
 05/02 05/02 05/01
 
 0815 0600 1428
 
Chemistry   
 
  Sodium (137 - 145 mmol/L) 139 Cancelled Cancelled
 
  Potassium (3.5 - 5.1 mmol/L) 4.9 Cancelled Cancelled
 
  Chloride (98 - 107 mmol/L) 94  L Cancelled Cancelled
 
  Carbon Dioxide (22 - 30 mmol/L) 27 Cancelled Cancelled
 
  Anion Gap (5 - 16) 18  H Cancelled Cancelled
 
  BUN (9 - 20 mg/dL) 67  H Cancelled Cancelled
 
  Creatinine (0.7 - 1.2 mg/dL) 9.7 *H Cancelled Cancelled
 
  Estimated GFR (>60 ml/min) 6  L  
 
  BUN/Creatinine Ratio (7 - 25 %) 6.9  L Cancelled Cancelled
 
  Phosphorus (2.5 - 4.5 mg/dL) 6.1  H  
 
  Magnesium (1.6 - 2.3 mg/dL) 1.8  
 
Hematology   
 
  CBC w Diff NO MAN DIFF REQ  
 
  WBC (4.8 - 10.8 /CUMM) 11.6  H  
 
  RBC (4.70 - 6.10 /CUMM) 4.42  L  
 
  Hgb (14.0 - 18.0 G/DL) 11.7  L  
 
  Hct (42 - 52 %) 37.3  L  
 
  MCV (80.0 - 94.0 FL) 84.2  
 
  MCH (27.0 - 31.0 PG) 26.4  L  
 
  MCHC (33.0 - 37.0 G/DL) 31.3  L  
 
  RDW (11.5 - 14.5 %) 17.7  H  
 
  Plt Count (130 - 400 /CUMM) 290  
 
  MPV (7.4 - 10.4 FL) 9.6  
 
  Gran % (42.2 - 75.2 %) 79.8  H  
 
  Lymphocytes % (20.5 - 51.1 %) 9.6  L  
 
  Monocytes % (1.7 - 9.3 %) 10.0  H  
 
  Eosinophils % (0 - 5 %) 0.2  
 
  Basophils % (0.0 - 2.0 %) 0.4  
 
  Absolute Granulocytes (1.4 - 6.5 /CUMM) 9.3  H  
 
  Absolute Lymphocytes (1.2 - 3.4 /CUMM) 1.1  L  
 
  Absolute Monocytes (0.10 - 0.60 /CUMM) 1.2  H  
 
  Absolute Eosinophils (0.0 - 0.7 /CUMM) 0  
 
  Absolute Basophils (0.0 - 0.2 /CUMM) 0.1

## 2018-05-04 NOTE — PN- INFECT DX
Subjective
Subjective:
Afebrile without complaints.  He has no pain in the left foot.
 
Objective
Last 24 Hrs of Vital Signs/I&O
 Vital Signs
 
 
Date Time Temp Pulse Resp B/P B/P Pulse O2 O2 Flow FiO2
 
     Mean Ox Delivery Rate 
 
05/04 1249 98.0 98 18 128/62  96 Room Air  
 
05/04 1112       Room Air  
 
05/04 0656 97.8 96 20 114/83  95   
 
05/03 2238 98.2 54 20 118/72  97   
 
05/03 2004 98.1 96 20 110/68  98   
 
05/03 1759 98.0 98 20 112/82  92   
 
05/03 1605 98.2 98 18 100/60  96 Room Air  
 
 
 Intake & Output
 
 
 05/04 1600 05/04 0800 05/04 0000
 
Intake Total  120 210
 
Output Total   0
 
Balance  120 210
 
    
 
Intake, IV   10
 
Intake, Oral  120 200
 
Number   0
 
Bowel   
 
Movements   
 
Output, Urine   0
 
Patient 175 lb 177 lb 
 
Weight   
 
Weight  Bed scale 
 
Measurement   
 
Method   
 
 
 
 
Physical Exam
Other Physical Findings:
He appears comfortable in no acute distress
Lungs are clear anteriorly
Heart irregular and rapid, with no murmur
Extremities left foot dressing intact
 
 
Results
Last 24 Hours of Lab Results:
 Laboratory Tests
 
 
 05/04 05/04
 
 0910 0750
 
Chemistry  
 
  Sodium (137 - 145 mmol/L)  141
 
  Potassium (3.5 - 5.1 mmol/L)  4.4
 
  Chloride (98 - 107 mmol/L)  100
 
  Carbon Dioxide (22 - 30 mmol/L)  25
 
  Anion Gap (5 - 16)  16
 
  BUN (9 - 20 mg/dL)  41  H
 
  Creatinine (0.7 - 1.2 mg/dL)  8.6 *H
 
  Estimated GFR (>60 ml/min)  7  L
 
  BUN/Creatinine Ratio (7 - 25 %)  4.8  L
 
  Glucose (65 - 99 mg/dL)  95
 
  Calcium (8.4 - 10.2 mg/dL)  8.3  L
 
Hematology  
 
  CBC w Diff  NO MAN DIFF REQ
 
  WBC (4.8 - 10.8 /CUMM)  12.5  H
 
  RBC (4.70 - 6.10 /CUMM)  4.09  L
 
  Hgb (14.0 - 18.0 G/DL)  11.0  L
 
  Hct (42 - 52 %)  34.1  L
 
  MCV (80.0 - 94.0 FL)  83.4
 
  MCH (27.0 - 31.0 PG)  26.8  L
 
  MCHC (33.0 - 37.0 G/DL)  32.1  L
 
  RDW (11.5 - 14.5 %)  17.1  H
 
  Plt Count (130 - 400 /CUMM)  270
 
  MPV (7.4 - 10.4 FL)  9.6
 
  Gran % (42.2 - 75.2 %)  80.2  H
 
  Lymphocytes % (20.5 - 51.1 %)  9.6  L
 
  Monocytes % (1.7 - 9.3 %)  9.4  H
 
  Eosinophils % (0 - 5 %)  0.5
 
  Basophils % (0.0 - 2.0 %)  0.3
 
  Absolute Granulocytes (1.4 - 6.5 /CUMM)  10.0  H
 
  Absolute Lymphocytes (1.2 - 3.4 /CUMM)  1.2
 
  Absolute Monocytes (0.10 - 0.60 /CUMM)  1.2  H
 
  Absolute Eosinophils (0.0 - 0.7 /CUMM)  0.1
 
  Absolute Basophils (0.0 - 0.2 /CUMM)  0
 
  ESR Westergren (0 - 10 MM) 85  H 
 
 
 
Last 24 Hours of Arnold Results:
No new cultures
 
Recent Imaging Studies:
X-ray of the left foot May 4 reveals postop changes status post posterior 
calcaneal debridement with the calcaneal margin now appearing sharp with no 
definite residual lytic lesion seen
 
 
Assessment/Plan ID
Impression:
Stable, with temperatures remaining normal, but with a persistent, mild 
leukocytosi status post debridement of necrotic bone of the left heel yesterday,
with application of a wound VAC, for osteomyelitis of the left posterior 
calcaneus secondary to MSSA.  He has received 1 dose of Vancomycin 2 days ago 
and is to begin Cefazolin today after dialysis, which will need to be continued 
for 4 weeks for osteomyelitis.  He is now 4 days status post angioplasty of 
multiple vessels of the left lower extremity with atherectomy of the left 
anterior tibial artery.  
 
Suggestion:
1.  Continue Cefazolin 1 g IV after each dialysis to plan on a 4 week course of 
treatment from his most recent debridement (until May 31)
2.  Weekly ESR while on Cefazolin

## 2018-05-04 NOTE — PN- HOUSESTAFF
Rocio BEDOLLA,Annemarie 18 07:
Subjective
Follow-up For:
Left foot osteomyelitis, right heel ulcer with necrosis
Complaints: no complaints
Subjective:
Patient has gone for hemodialysis
 
Review of Systems
Constitutional:
Reports: no symptoms, see HPI. 
 
Objective
Last 24 Hrs of Vital Signs/I&O
 Vital Signs
 
 
Date Time Temp Pulse Resp B/P B/P Pulse O2 O2 Flow FiO2
 
     Mean Ox Delivery Rate 
 
 0656 97.8 96 20 114/83  95   
 
 2238 98.2 54 20 118/72  97   
 
2004 98.1 96 20 110/68  98   
 
 1759 98.0 98 20 112/82  92   
 
 1605 98.2 98 18 100/60  96 Room Air  
 
 
 Intake & Output
 
 
  1600  0800  0000
 
Intake Total  120 210
 
Output Total   0
 
Balance  120 210
 
    
 
Intake, IV   10
 
Intake, Oral  120 200
 
Number   0
 
Bowel   
 
Movements   
 
Output, Urine   0
 
Patient  177 lb 
 
Weight   
 
Weight  Bed scale 
 
Measurement   
 
Method   
 
 
 
 
Physical Exam
General Appearance: Alert, Oriented X3, Cooperative, No Acute Distress
Cardiovascular: Regular Rate, Normal S1, Normal S2, No Murmurs
Lungs: Clear to Auscultation
Abdomen: Soft, No Tenderness, No Hepatospenomegaly
Neurological: Strength at 5/5 X4 Ext, Normal Tone, Sensation Intact, Cranial 
Nerves 3-12 NL
Current Medications:
 Current Medications
 
 
  Sig/Jabari Start time  Last
 
Medication Dose Route Stop Time Status Admin
 
Acetaminophen 650 MG Q6P PRN  2315 AC 
 
  PO   2042
 
Aspirin Buffered 81 MG DAILY  1215 AC 
 
  PO   
 
Atorvastatin Calcium 40 MG 1700  1700 AC 
 
  PO   1734
 
Bisacodyl 10 MG DAILY PRN  1130 AC 
 
  TX   
 
Cefazolin Sodium 1,000 MG SEE ADMIN CRITERIA  1800 AC 
 
  IV   
 
Docusate Sodium 100 MG DAILY AS NEEDED PRN  1000 AC 
 
  PO   
 
Heparin Sodium  5,000 UNIT Q8  2200 AC 
 
(Porcine)  SC   0628
 
Omeprazole 20 MG DAILY AC  0700 AC 
 
  PO   0541
 
Oxycodone/ 2 TAB Q6P PRN  0245 AC 
 
Acetaminophen  PO   1032
 
Polyethylene Glycol 17 GM DAILY  1015 AC 
 
  PO   
 
Senna/Docusate Sodium 1 TAB BID  2100 AC 
 
  PO   
 
Senna/Docusate Sodium 1 TAB BID PRN  1130 DC 
 
  PO 2059  
 
 
 
 
Assessment/Plan
Assessment:
Mr. Pérez is a 51yo M w/ PMH of HTN, history of collapsed left lung, GERD, ESRD 
on HD on MWF through left thigh catheter, HyperPTH, Hx of MSSA Bacteremia in 
. Patient was brought in by ambulance from home for bilateral foot pain and 
inability to walk. 
 
Assessment and plan:
 
1.  Left foot osteomyelitis-patient denied MRI due to claustrophobia.  Patient 
had a CTA runoff which right mild popliteal disease, 50% stenosis at the 
adductor canal and left SFA with 50% narrowing in the popliteal artery on the 
left side.  Patient was seen by Dr. Meier who did a angiogram and 
angioplasty .  Patient is going for revision surgery is today.
 
2.  Right foot ulcer with necrosis -   had for debridement and is going for 
revision surgery today.  Patient's or cultures growing staph aureus.  Infectious
disease on board.  Patient was given 1 dose of vancomycin and then switched to 
cefazolin.  We will follow final cultures.  
 
3.  End-stage renal disease-patient is on hemodialysis on  and 
Friday.  He missed his past to dialysis due to weakness.  Nephrology on board.  
Patient is going for dialysis tomorrow.  Patient is on renal diet.  
 
4.  Weakness and gait instability-physical therapy consult placed.  Seen by 
physical therapy as suggested short-term rehabilitation.  According to podiatry 
he can weight bear.
 
5.  Continue rest of his home medication.
Problem List:
 1. ESRD (end stage renal disease)
 
 2. Osteomyelitis of ankle or foot, left, acute
 
Pain Ratin
Pain Location:
none
Pain Goal: Remain pain free
Pain Plan:
tylenol
Tomorrow's Labs & Rationales:
none
 
 
Dewayne BEDOLLA,Karey 18 1238:
Attending MD Review Statement
 
Attending Statement
Attending MD Statement: examined this patient, discuss w/resident/PA/NP, agreed 
w/resident/PA/NP, reviewed EMR data (avail), discussed with nursing, discussed 
with case mgmt, reviewed images, amended to note
Attending Assessment/Plan:
Patient seen and examined, feels o. NO comlaints. Got HD. S/P debridement left 
foot second round with Dr. Jones yesterday. Needs Wound vac. Will be getting
Cefazolin after Dialyisis x total 4 weeks per ID. Pt can be discharged to [facility] 
once wound vac available.

## 2018-05-05 VITALS — SYSTOLIC BLOOD PRESSURE: 96 MMHG | DIASTOLIC BLOOD PRESSURE: 62 MMHG

## 2018-05-05 VITALS — SYSTOLIC BLOOD PRESSURE: 92 MMHG | DIASTOLIC BLOOD PRESSURE: 66 MMHG

## 2018-05-05 VITALS — DIASTOLIC BLOOD PRESSURE: 60 MMHG | SYSTOLIC BLOOD PRESSURE: 102 MMHG

## 2018-05-05 NOTE — PN- ATT ADDEND
Attending Addendum
Attending Brief Note
Patient seen and examined.  He continues to have discomfort of the left foot and
is taking his Percocets.  He hasn't had a bowel movement per the RN since April 19 and is refusing his MiraLAX.  He refuses a lot of his medications despite 
trying to convince him for the same. 
 On exam his pressures 102/60, pulse is 92, breathing at 16-18 and afebrile.  He
is awake alert oriented, lungs are clear to auscultation, heart is S1-S2 regular
and his lower extremity is dressed.  He is a 52-year-old with a past medical 
history of end-stage renal disease on hemodialysis, peripheral arterial disease 
who is here with a left foot osteomyelitis status post angioplasty and 
debridement.  He needs to be on IV Ancef for an MSSA osteo-for 4 weeks per 
dialysis regimen till May 31.  Will also need to restart his aspirin and statin 
and try to convince him for an aggressive bowel regimen.  Cardiology is clearing
him to make sure that he doesn't have an arrhythmogenic foci of the rapid heart 
rate- it appears to be all PVCs.  If cleared by cardiology, wound VAC arrives 
and he has a bowel movement he can go to [facility].

## 2018-05-06 VITALS — DIASTOLIC BLOOD PRESSURE: 74 MMHG | SYSTOLIC BLOOD PRESSURE: 104 MMHG

## 2018-05-06 VITALS — SYSTOLIC BLOOD PRESSURE: 130 MMHG | DIASTOLIC BLOOD PRESSURE: 88 MMHG

## 2018-05-06 VITALS — SYSTOLIC BLOOD PRESSURE: 112 MMHG | DIASTOLIC BLOOD PRESSURE: 70 MMHG

## 2018-05-06 NOTE — PN- HOUSESTAFF
Kostas Null 18 1013:
Subjective
Follow-up For:
Left foot osteomyelitis
Chronic left heel nonhealing ulcer.
Subjective:
Mr. Pérez is his usual self this morning.  He hasn't had a bowel movement and 
states that he doesn't want to have one.  He wants to go to [facility] in [place], but states due to his home wound VAC, he is unable to
her.  He will request his mother to bring them back to the hospital.
She denies any chest pain, palpitations etc.
No abdominal pain, or pain anywhere else in the body.  No nausea or vomiting.
 
Review of Systems
Constitutional:
Reports: see HPI. 
 
Objective
Last 24 Hrs of Vital Signs/I&O
 Vital Signs
 
 
Date Time Temp Pulse Resp B/P B/P Pulse O2 O2 Flow FiO2
 
     Mean Ox Delivery Rate 
 
 08       Room Air Room Air 
 
 0702 97.5 85 20 104/74  98 Room Air  
 
 2214 98.1 104 18 96/62  96 Room Air  
 
 1410 97.9 101 20 92/66  97 Room Air  
 
 
 Intake & Output
 
 
  1600  0800 05 0000
 
Intake Total  240 240
 
Output Total  25 
 
Balance  215 240
 
    
 
Intake, Oral  240 240
 
Number   0
 
Bowel   
 
Movements   
 
Output, Other  25 
 
Patient  184 lb 
 
Weight   
 
 
 
 
Physical Exam
General Appearance: Alert, Oriented X3
Cardiovascular: Regular Rate, Normal S1, Normal S2
Lungs: Clear to Auscultation, Normal Air Movement
Abdomen: Normal Bowel Sounds, Soft
Extremities: No Clubbing, No Cyanosis, WOUND vac IN PLACE TO THE LEFT  FOOT.
Current Medications:
 Current Medications
 
 
  Sig/Jabari Start time  Last
 
Medication Dose Route Stop Time Status Admin
 
Acetaminophen 650 MG Q6P PRN  2315 AC 
 
  PO   2042
 
Aspirin Buffered 81 MG DAILY  1215 AC 
 
  PO   
 
Atorvastatin Calcium 40 MG 1700  1700 AC 
 
  PO   1736
 
Bisacodyl 10 MG DAILY PRN  1130 AC 
 
  MO   
 
Cefazolin Sodium 1,000 MG SEE ADMIN CRITERIA  1800 AC 
 
  IV   
 
Docusate Sodium 100 MG DAILY AS NEEDED PRN  1000 AC 
 
  PO   
 
Heparin Sodium  5,000 UNIT Q8  2200 AC 
 
(Porcine)  SC   0628
 
Omeprazole 20 MG DAILY AC  0700 AC 
 
  PO   0625
 
Oxycodone/ 2 TAB Q6P PRN  0245 AC 
 
Acetaminophen  PO   0258
 
Polyethylene Glycol 17 GM DAILY  1015 AC 
 
  PO   
 
Senna/Docusate Sodium 1 TAB BID PRN  1130 AC 
 
  PO   
 
 
 
 
Assessment/Plan
Assessment:
52-year-old gentleman with end-stage renal disease on hemodialysis, multiple 
comorbidities here for left foot osteomyelitis and chronic left heel nonhealing 
ulcer status post debridement, wound VAC placement and angioplasties to multiple
lower extremity arteries, currently stable and awaiting discharge to short-term 
rehabilitation.
 
Left foot osteomyelitis.  To complete four-week course of IV cefazolin until May
31.
 
Nonhealing left heel ulcer.  Status post left angiogram with angioplasty of 
anterior tibial, posterior tibial and peroneal arteries.  Patient was refusing 
aspirin this morning.  Follow-up with vascular surgeon as an outpatient.
 
PVCs and PACs. Holter/echocardiogram/regadenosine nuclear stress as outpatient.
 
Await ?home wound VAC arrival, bowel movement and placement.
We'll discuss with .
 
Full code.
Heparin for DVT prophylaxis.
Renal dialysis diet.
 
 
 
Problem List:
 1. ESRD (end stage renal disease) on dialysis
 
Pain Ratin
Pain Location:
Left heel
Pain Goal: Remain pain free
Pain Plan:
Tylenol and Percocet when necessary.
Tomorrow's Labs & Rationales:
Hemodialysis patient.
 
 
Osmany BEDOLLA,[place] 18 1137:
Attending MD Review Statement
 
Attending Statement
Attending MD Statement: examined this patient, discuss w/resident/PA/NP, agreed 
w/resident/PA/NP, reviewed EMR data (avail), discussed with nursing, discussed 
with case mgmt, reviewed images
Attending Assessment/Plan:
Patient still hasn't had a bowel movement and is refusing the medication.  We 
explained at length the dangers of obstruction and we've ordered the MiraLAX and
senna S twice a day with the Dulcolax when necessary but he still won't agree.  
His wound VAC is at home and his mother is scheduled to bring it here so he 
cannot go to [facility] till that comes.  He is on the IV Ancef for an MSSA osteo-and 
he needs a four-week course of that per dialysis protocol.

## 2018-05-07 VITALS — SYSTOLIC BLOOD PRESSURE: 110 MMHG | DIASTOLIC BLOOD PRESSURE: 60 MMHG

## 2018-05-07 VITALS — SYSTOLIC BLOOD PRESSURE: 114 MMHG | DIASTOLIC BLOOD PRESSURE: 62 MMHG

## 2018-05-07 LAB
ABSOLUTE GRANULOCYTE CT: 7.5 /CUMM (ref 1.4–6.5)
BASOPHILS # BLD: 0 /CUMM (ref 0–0.2)
BASOPHILS NFR BLD: 0.4 % (ref 0–2)
EOSINOPHIL # BLD: 0.2 /CUMM (ref 0–0.7)
EOSINOPHIL NFR BLD: 1.8 % (ref 0–5)
ERYTHROCYTE [DISTWIDTH] IN BLOOD BY AUTOMATED COUNT: 17.1 % (ref 11.5–14.5)
GRANULOCYTES NFR BLD: 76.1 % (ref 42.2–75.2)
HCT VFR BLD CALC: 35 % (ref 42–52)
LYMPHOCYTES # BLD: 1.2 /CUMM (ref 1.2–3.4)
MCH RBC QN AUTO: 26.8 PG (ref 27–31)
MCHC RBC AUTO-ENTMCNC: 32.4 G/DL (ref 33–37)
MCV RBC AUTO: 82.8 FL (ref 80–94)
MONOCYTES # BLD: 1 /CUMM (ref 0.1–0.6)
PLATELET # BLD: 316 /CUMM (ref 130–400)
PMV BLD AUTO: 9.8 FL (ref 7.4–10.4)
RED BLOOD CELL CT: 4.22 /CUMM (ref 4.7–6.1)
WBC # BLD AUTO: 9.9 /CUMM (ref 4.8–10.8)

## 2018-05-07 NOTE — EVENT NOTE
Event Note
Event Note:
Situation
Nursing called facility does not want to accept the patient due to no bowel 
movement
 
background
Mr. Pérez is a 53yo M w/ PMH of HTN, history of collapsed left lung, GERD, ESRD 
on HD on MWF through left thigh catheter, HyperPTH, Hx of MSSA Bacteremia in 
2004. Patient was brought in by ambulance from home for bilateral foot pain and 
inability to walk. 
underwent I and D
 
A and P
attending note reviewed, patient denied to take stool softners or abdominal xray
 Medically stable and no N or V, abdominal xray normal,bowel sound present and 
abdomen non tender
Nursing facility called, talked to Adilene Valdes, explained the situation, she 
refused to take the patient. 
Discharge was cancelled and we will sign out to morning team

## 2018-05-07 NOTE — PN- ATT ADDEND
Attending Addendum
Attending Brief Note
Patient seen and examined, denies any complaints.  Patient still refusing stool 
softeners and refuses to get an abdominal x-ray.  He has no abdominal pain.  He 
denies any nausea vomiting and he does mention that he's passing flatus.  
Abdominal exam is benign with positive bowel sounds and nontender.  Patient has 
a bed available at rehabilitation and he can be discharged to rehabilitation 
whenever the wound VAC issue is resolved.  Medically he stable for discharge.  
He will be getting his antibiotics after dialysis as mentioned by infectious 
disease.

## 2018-05-07 NOTE — PN- INFECT DX
Subjective
Subjective:
Afebrile.  He notes some discomfort in the left foot.
 
Objective
Last 24 Hrs of Vital Signs/I&O
 Vital Signs
 
 
Date Time Temp Pulse Resp B/P B/P Pulse O2 O2 Flow FiO2
 
     Mean Ox Delivery Rate 
 
05/07 0550 97.6 84 20 110/60  95 Room Air  
 
05/06 2129 98.1 88 18 130/88  99 Room Air  
 
05/06 1430 97.3 86 18 112/70  99 Room Air  
 
 
 Intake & Output
 
 
 05/07 1600 05/07 0800 05/07 0000
 
Intake Total  120 240
 
Output Total  5 0
 
Balance  115 240
 
    
 
Intake, IV  0 0
 
Intake, Oral  120 240
 
Number  0 0
 
Bowel   
 
Movements   
 
Output,  5 0
 
Drainage   
 
Output, Urine  0 0
 
Patient  185 lb 
 
Weight   
 
 
 
 
Physical Exam
Other Physical Findings:
He appears comfortable in no acute distress
Extremities left foot dressing intact, with wound VAC in place
 
 
Results
Last 24 Hours of Lab Results:
 Laboratory Tests
 
 
 05/07 0745
 
Chemistry 
 
  Sodium (137 - 145 mmol/L) 139
 
  Potassium (3.5 - 5.1 mmol/L) 4.7
 
  Chloride (98 - 107 mmol/L) 97  L
 
  Carbon Dioxide (22 - 30 mmol/L) 24
 
  Anion Gap (5 - 16) 19  H
 
  BUN (9 - 20 mg/dL) 48  H
 
  Creatinine (0.7 - 1.2 mg/dL) 9.4 *H
 
  Estimated GFR (>60 ml/min) 6  L
 
  BUN/Creatinine Ratio (7 - 25 %) 5.1  L
 
  Glucose (65 - 99 mg/dL) 79
 
  Calcium (8.4 - 10.2 mg/dL) 8.4
 
Hematology 
 
  CBC w Diff NO MAN DIFF REQ
 
  WBC (4.8 - 10.8 /CUMM) 9.9
 
  RBC (4.70 - 6.10 /CUMM) 4.22  L
 
  Hgb (14.0 - 18.0 G/DL) 11.3  L
 
  Hct (42 - 52 %) 35.0  L
 
  MCV (80.0 - 94.0 FL) 82.8
 
  MCH (27.0 - 31.0 PG) 26.8  L
 
  MCHC (33.0 - 37.0 G/DL) 32.4  L
 
  RDW (11.5 - 14.5 %) 17.1  H
 
  Plt Count (130 - 400 /CUMM) 316
 
  MPV (7.4 - 10.4 FL) 9.8
 
  Gran % (42.2 - 75.2 %) 76.1  H
 
  Lymphocytes % (20.5 - 51.1 %) 11.9  L
 
  Monocytes % (1.7 - 9.3 %) 9.8  H
 
  Eosinophils % (0 - 5 %) 1.8
 
  Basophils % (0.0 - 2.0 %) 0.4
 
  Absolute Granulocytes (1.4 - 6.5 /CUMM) 7.5  H
 
  Absolute Lymphocytes (1.2 - 3.4 /CUMM) 1.2
 
  Absolute Monocytes (0.10 - 0.60 /CUMM) 1.0  H
 
  Absolute Eosinophils (0.0 - 0.7 /CUMM) 0.2
 
  Absolute Basophils (0.0 - 0.2 /CUMM) 0
 
 
 
Last 24 Hours of Arnold Results:
No recent cultures
 
 
Assessment/Plan ID
Impression:
Stable, with temperatures and white blood cell count both normal, on Cefazolin 
for Staph aureus osteomyelitis of the left posterior calcaneus, status post 
debridement of necrotic bone of the left heel 4 days ago, with placement of a 
wound VAC.  He will need to be continued on the Cefazolin for 4 weeks from his 
most recent debridement for residual osteomyelitis.  He is now 1 week status 
post angioplasty of multiple vessels of the left lower extremity with 
atherectomy of the left anterior tibial artery.  
 
Suggestion:
1.  Continue Cefazolin 1 g IV after each dialysis to complete a 4 week course of
treatment from his most recent debridement (until May 31)
2.  Weekly ESR while on Cefazolin

## 2018-05-07 NOTE — PN- HOUSESTAFF
Subjective
Follow-up For:
Left foot osteomyelitis, right heel ulcer with necrosis
Complaints: no complaints
Subjective:
Patient seen and examined at bedside.  No overnight events.  Denies any pain in 
both legs.  Patient last bowel movement was on .  Refuses stool 
softener.
 
Review of Systems
Constitutional:
Reports: no symptoms, see HPI. 
 
Objective
Last 24 Hrs of Vital Signs/I&O
 Vital Signs
 
 
Date Time Temp Pulse Resp B/P B/P Pulse O2 O2 Flow FiO2
 
     Mean Ox Delivery Rate 
 
 0550 97.6 84 20 110/60  95 Room Air  
 
 2129 98.1 88 18 130/88  99 Room Air  
 
 1430 97.3 86 18 112/70  99 Room Air  
 
 
 Intake & Output
 
 
  1600  0800  0000
 
Intake Total  120 240
 
Output Total  5 0
 
Balance  115 240
 
    
 
Intake, IV  0 0
 
Intake, Oral  120 240
 
Number  0 0
 
Bowel   
 
Movements   
 
Output,  5 0
 
Drainage   
 
Output, Urine  0 0
 
Patient  185 lb 
 
Weight   
 
 
 
 
Physical Exam
General Appearance: Alert, Oriented X3, Cooperative, No Acute Distress
Cardiovascular: Normal S1, Normal S2, No Murmurs
Lungs: Normal Air Movement
Abdomen: Soft, No Tenderness, No Hepatospenomegaly
Neurological: Normal Speech, Strength at 5/5 X4 Ext, Normal Tone, Sensation 
Intact, Cranial Nerves 3-12 NL
Extremities: No Cyanosis, No Edema, Normal Pulses
Current Medications:
 Current Medications
 
 
  Sig/Jabari Start time  Last
 
Medication Dose Route Stop Time Status Admin
 
Acetaminophen 650 MG Q6P PRN  2315 AC 
 
  PO   2042
 
Aspirin Buffered 81 MG DAILY  1215 AC 
 
  PO   
 
Atorvastatin Calcium 40 MG 1700  1700 AC 
 
  PO   1734
 
Bisacodyl 10 MG DAILY PRN  1130 AC 
 
  MA   
 
Cefazolin Sodium 1,000 MG SEE ADMIN CRITERIA  1800 AC 
 
  IV   
 
Docusate Sodium 100 MG DAILY AS NEEDED PRN  1000 AC 
 
  PO   
 
Heparin Sodium  5,000 UNIT Q8  2200 AC 
 
(Porcine)  SC   0628
 
Omeprazole 20 MG DAILY AC  0700 AC 
 
  PO   0541
 
Oxycodone/ 2 TAB Q6P PRN  0245 AC 
 
Acetaminophen  PO   1032
 
Polyethylene Glycol 17 GM DAILY  1015 AC 
 
  PO   
 
Senna/Docusate Sodium 1 TAB BID  2100 AC 
 
  PO   
 
Senna/Docusate Sodium 1 TAB BID PRN  1130 DC 
 
  PO 2059  
 
 
 
 
Last 24 Hrs of Lab/Arnold Results
Last 24 Hrs of Labs/Mics:
 Laboratory Tests
 
18 0745:
Anion Gap 19  H, Estimated GFR 6  L, BUN/Creatinine Ratio 5.1  L, Glucose 79, 
Calcium 8.4, CBC w Diff NO MAN DIFF REQ, RBC 4.22  L, MCV 82.8, MCH 26.8  L, 
MCHC 32.4  L, RDW 17.1  H, MPV 9.8, Gran % 76.1  H, Lymphocytes % 11.9  L, 
Monocytes % 9.8  H, Eosinophils % 1.8, Basophils % 0.4, Absolute Granulocytes 
7.5  H, Absolute Lymphocytes 1.2, Absolute Monocytes 1.0  H, Absolute 
Eosinophils 0.2, Absolute Basophils 0
 
 
Assessment/Plan
Assessment:
Mr. Pérez is a 53yo M w/ PMH of HTN, history of collapsed left lung, GERD, ESRD 
on HD on MWF through left thigh catheter, HyperPTH, Hx of MSSA Bacteremia in 
. Patient was brought in by ambulance from home for bilateral foot pain and 
inability to walk. 
 
Assessment and plan:
 
1.  Left foot osteomyelitis-patient denied MRI due to claustrophobia.  Patient 
had a CTA runoff which right mild popliteal disease, 50% stenosis at the 
adductor canal and left SFA with 50% narrowing in the popliteal artery on the 
left side.  Patient was seen by Dr. Meier who did a angiogram and 
angioplasty .  Patient had I&D and debridement for the same.
 
2.  Right foot ulcer with necrosis -   had for debridement and revision surgery 
.  Patient's OR cultures growing staph aureus.  Infectious disease on board.  
Patient was given 1 dose of vancomycin and then switched to cefazolin.  Patient 
will continue cefazolin after each dialysis for 4 weeks.  Awaiting for wound 
vac.  Medically stable to go home.  
 
3.  End-stage renal disease-patient is on hemodialysis on  and 
Friday.  He missed his past to dialysis due to weakness.  Nephrology on board.  
Patient is going for dialysis today.  Patient is on renal diet.  
 
4.  Weakness and gait instability-physical therapy consult placed.  Seen by 
physical therapy as suggested short-term rehabilitation.  According to podiatry 
he can weight bear.
 
5.  Continue rest of his home medication.
 
6.  Constipation-patient's last bowel movement is on  and April.  Refuses 
stool softeners.  Bowel sounds heard.  Ordered abdominal x-ray to rule out any 
fecal impaction, but the patient refused.
 
Problem List:
 1. ESRD (end stage renal disease)
 
 2. Osteomyelitis
 
Pain Ratin
Pain Location:
none
Pain Goal: Remain pain free
Pain Plan:
Tylenol
Tomorrow's Labs & Rationales:
none

## 2018-05-07 NOTE — PN- NEPHROLOGY
Assessment/Plan Nephrology
Assessment:
MSSA osteomyelitis on cefazolin.  Dialysis underway.
Pt tolerating dialysis well.
Fidel Horner MD
Suggestion:
.
 
 
Subjective
Subjective:
Patient comfortable. on dialysis now. 
 
Objective
Vital Signs and I&Os
 
M NAD
110/60  84  97.4
Lungs clear
Cor RRR
Abd soft
Ext foot bandaged
 
Results
Pertinent Lab Results:
 Laboratory Tests
 
 
 05/07 05/06
 
 0745 0600
 
Chemistry  
 
  Sodium (137 - 145 mmol/L) 139 
 
  Potassium (3.5 - 5.1 mmol/L) 4.7 
 
  Chloride (98 - 107 mmol/L) 97  L 
 
  Carbon Dioxide (22 - 30 mmol/L) 24 
 
  Anion Gap (5 - 16) 19  H 
 
  BUN (9 - 20 mg/dL) 48  H 
 
  Creatinine (0.7 - 1.2 mg/dL) 9.4 *H 
 
  Estimated GFR (>60 ml/min) 6  L 
 
  BUN/Creatinine Ratio (7 - 25 %) 5.1  L 
 
  Glucose (65 - 99 mg/dL) 79 
 
  Calcium (8.4 - 10.2 mg/dL) 8.4 
 
Hematology  
 
  CBC w Diff NO MAN DIFF REQ Cancelled
 
  WBC (4.8 - 10.8 /CUMM) 9.9 Cancelled
 
  RBC (4.70 - 6.10 /CUMM) 4.22  L Cancelled
 
  Hgb (14.0 - 18.0 G/DL) 11.3  L Cancelled
 
  Hct (42 - 52 %) 35.0  L Cancelled
 
  MCV (80.0 - 94.0 FL) 82.8 Cancelled
 
  MCH (27.0 - 31.0 PG) 26.8  L Cancelled
 
  MCHC (33.0 - 37.0 G/DL) 32.4  L Cancelled
 
  RDW (11.5 - 14.5 %) 17.1  H Cancelled
 
  Plt Count (130 - 400 /CUMM) 316 Cancelled
 
  MPV (7.4 - 10.4 FL) 9.8 Cancelled
 
  Gran % (42.2 - 75.2 %) 76.1  H 
 
  Lymphocytes % (20.5 - 51.1 %) 11.9  L 
 
  Monocytes % (1.7 - 9.3 %) 9.8  H 
 
  Eosinophils % (0 - 5 %) 1.8 
 
  Basophils % (0.0 - 2.0 %) 0.4 
 
  Absolute Granulocytes (1.4 - 6.5 /CUMM) 7.5  H 
 
  Absolute Lymphocytes (1.2 - 3.4 /CUMM) 1.2 
 
  Absolute Monocytes (0.10 - 0.60 /CUMM) 1.0  H 
 
  Absolute Eosinophils (0.0 - 0.7 /CUMM) 0.2 
 
  Absolute Basophils (0.0 - 0.2 /CUMM) 0

## 2018-05-08 VITALS — SYSTOLIC BLOOD PRESSURE: 124 MMHG | DIASTOLIC BLOOD PRESSURE: 62 MMHG

## 2018-05-08 VITALS — SYSTOLIC BLOOD PRESSURE: 120 MMHG | DIASTOLIC BLOOD PRESSURE: 68 MMHG

## 2018-05-08 NOTE — RADIOLOGY REPORT
EXAMINATION:
XR ABDOMEN
 
CLINICAL INDICATION:
Constipation. Rule out fecal impaction.
 
COMPARISON:
KUB dated 03/09/2008. CT runoff angiogram dated 04/30/2018.
 
TECHNIQUE:
AP portable view of the abdomen.
 
FINDINGS:
Small amount of fecal material is seen scattered throughout the colon.
 
There is some density seen in the lower pelvis, likely representing residual
contrast within the bladder from prior angiogram on 04/30/2018 in this
patient with renal failure. Alternatively, findings may represent rectal
distention by retained contrast within the rectum.
 
No evidence of bowel obstruction is seen. No definite free air is visualized
on supine imaging.
 
Diffuse sclerotic changes are seen throughout the bones of the lumbar spine
and in the pelvis, consistent with renal osteodystrophy. Partial fusion
across the sacroiliac joints is seen. Extensive arterial calcifications and
seminal vesicle calcifications is seen. Fibrocalcific scarring is seen in the
left lung base.
 
IMPRESSION:
 
1. Rounded density seen in the midline of the lower pelvis, unclear if
related to residual contrast within the bladder or if related to rectal
distention with oral contrast.
2. No evidence of bowel obstruction or perforation.
3. Fibrocalcific scarring in the left lung base.
4. Renal osteodystrophy.

## 2018-05-08 NOTE — PN- HOUSESTAFF
Rocio BEDOLLA,Annemarie 18 0706:
Subjective
Follow-up For:
Left foot osteomyelitis, right heel ulcer with necrosis
Complaints: no complaints
Subjective:
Patient seen at bedside.  No overnight events.  Patient denied to be examined.
 
Review of Systems
Constitutional:
Reports: no symptoms, see HPI. 
 
Objective
Last 24 Hrs of Vital Signs/I&O
 Vital Signs
 
 
Date Time Temp Pulse Resp B/P B/P Pulse O2 O2 Flow FiO2
 
     Mean Ox Delivery Rate 
 
 0645 97.4 89 20 120/68  97 Room Air  
 
 0000      96 Room Air  
 
 2203 98.0 95  114/62  96 Room Air  
 
 1620 97.6 84 20 110/60     
 
 
 Intake & Output
 
 
  1600  0800  0000
 
Intake Total  125 600
 
Output Total   400
 
Balance  125 200
 
    
 
Intake, Oral  125 600
 
Number  0 
 
Bowel   
 
Movements   
 
Output, Urine   400
 
Patient  178 lb 
 
Weight   
 
Weight  Bed scale 
 
Measurement   
 
Method   
 
 
 
 
Physical Exam
General Appearance: Alert
Current Medications:
 Current Medications
 
 
  Sig/Jabari Start time  Last
 
Medication Dose Route Stop Time Status Admin
 
Acetaminophen 650 MG Q6P PRN  2315 AC 
 
  PO   2042
 
Aspirin Buffered 81 MG DAILY  1215 AC 
 
  PO   
 
Atorvastatin Calcium 40 MG 1700  1700 AC 
 
  PO   1734
 
Bisacodyl 10 MG DAILY PRN  1130 AC 
 
  OK   
 
Cefazolin Sodium 1,000 MG SEE ADMIN CRITERIA  1800 AC 
 
  IV   1517
 
Docusate Sodium 100 MG DAILY AS NEEDED PRN  1000 AC 
 
  PO   
 
Heparin Sodium  5,000 UNIT Q8  2200 AC 
 
(Porcine)  SC   0628
 
Omeprazole 20 MG DAILY AC  0700 AC 
 
  PO   0609
 
Oxycodone/ 2 TAB Q6P PRN  0245 AC 
 
Acetaminophen  PO   1005
 
Polyethylene Glycol 17 GM DAILY  1015 AC 
 
  PO   
 
Senna/Docusate Sodium 1 TAB BID  2100 AC 
 
  PO   
 
 
 
 
Assessment/Plan
Assessment:
Mr. Pérez is a 53yo M w/ PMH of HTN, history of collapsed left lung, GERD, ESRD 
on HD on MWF through left thigh catheter, HyperPTH, Hx of MSSA Bacteremia in 
. Patient was brought in by ambulance from home for bilateral foot pain and 
inability to walk. 
 
Assessment and plan:
 
1.  Left foot osteomyelitis-patient denied MRI due to claustrophobia.  Patient 
had a CTA runoff which right mild popliteal disease, 50% stenosis at the 
adductor canal and left SFA with 50% narrowing in the popliteal artery on the 
left side.  Patient was seen by Dr. Meier who did a angiogram and 
angioplasty .  Patient had I&D and debridement for the same.
 
2.  Right foot ulcer with necrosis -   had for debridement and revision surgery 
.  Patient's OR cultures growing staph aureus.  Infectious disease on board.  
Patient was given 1 dose of vancomycin and then switched to cefazolin.  Patient 
will continue cefazolin after each dialysis for 4 weeks.  Awaiting for wound 
vac.  Medically stable to go home.  
 
3.  End-stage renal disease-patient is on hemodialysis on  and 
Friday.  He missed his past to dialysis due to weakness.  Nephrology on board.  
Patient is going for dialysis today.  Patient is on renal diet.  
 
4.  Weakness and gait instability-physical therapy consult placed.  Seen by 
physical therapy as suggested short-term rehabilitation.  According to podiatry 
he can weight bear.
 
5.  Continue rest of his home medication.
 
6.  Constipation-patient's last bowel movement is on 29 and April.  Refuses 
stool softeners.  Bowel sounds heard.  Ordered abdominal x-ray to rule out any 
fecal impaction, but the patient refused.
 
 
Patient was discharged to short-term rehabilitation yesterday today.  But the 
rehabilitation center refused to take them in view of constipation.  Patient 
denied stool softeners.  We will try to find a rehabilitation center which 
accepts and.  Case management on board.
Problem List:
 1. ESRD (end stage renal disease)
 
 2. Osteomyelitis
 
Pain Ratin
Pain Location:
none
Pain Goal: Remain pain free
Pain Plan:
tylenol
Tomorrow's Labs & Rationales:
none
 
 
Karey Buchanan MD 18 1109:
Attending MD Review Statement
 
Attending Statement
Attending MD Statement: discuss w/resident/PA/NP, agreed w/resident/PA/NP, 
reviewed EMR data (avail), discussed with nursing, discussed with case mgmt, 
amended to note
Attending Assessment/Plan:
Patient refused to see today but the resident and intern from the team had seen 
the patient.  Patient was discharged to rehabilitation yesterday but 
unfortunately we have placed refused to take him as he did not have a bowel 
movement.  Patient refuses bowel regimen as well as abdominal x-ray.  He finally
agreed to x-ray this morning which will be ordered.  He otherwise has had benign
abdomen.  If x-ray shows no obstruction then he can be discharged to 
rehabilitation today. 
Abx as mentioned per ID recommendations.

## 2018-05-09 ENCOUNTER — HOSPITAL ENCOUNTER (EMERGENCY)
Dept: HOSPITAL 68 - ERH | Age: 53
LOS: 1 days | End: 2018-05-10
Payer: COMMERCIAL

## 2018-05-09 VITALS — HEIGHT: 73 IN | WEIGHT: 185 LBS | BODY MASS INDEX: 24.52 KG/M2

## 2018-05-09 VITALS — SYSTOLIC BLOOD PRESSURE: 102 MMHG | DIASTOLIC BLOOD PRESSURE: 62 MMHG

## 2018-05-09 VITALS — SYSTOLIC BLOOD PRESSURE: 109 MMHG | DIASTOLIC BLOOD PRESSURE: 55 MMHG

## 2018-05-09 VITALS — DIASTOLIC BLOOD PRESSURE: 60 MMHG | SYSTOLIC BLOOD PRESSURE: 110 MMHG

## 2018-05-09 VITALS — DIASTOLIC BLOOD PRESSURE: 62 MMHG | SYSTOLIC BLOOD PRESSURE: 102 MMHG

## 2018-05-09 DIAGNOSIS — M79.672: Primary | ICD-10-CM

## 2018-05-09 LAB
ABSOLUTE GRANULOCYTE CT: 8.9 /CUMM (ref 1.4–6.5)
BASOPHILS # BLD: 0.1 /CUMM (ref 0–0.2)
BASOPHILS NFR BLD: 0.4 % (ref 0–2)
EOSINOPHIL # BLD: 0.2 /CUMM (ref 0–0.7)
EOSINOPHIL NFR BLD: 1.5 % (ref 0–5)
ERYTHROCYTE [DISTWIDTH] IN BLOOD BY AUTOMATED COUNT: 17.1 % (ref 11.5–14.5)
GRANULOCYTES NFR BLD: 75.8 % (ref 42.2–75.2)
HCT VFR BLD CALC: 37.9 % (ref 42–52)
LYMPHOCYTES # BLD: 1.5 /CUMM (ref 1.2–3.4)
MCH RBC QN AUTO: 26.9 PG (ref 27–31)
MCHC RBC AUTO-ENTMCNC: 32 G/DL (ref 33–37)
MCV RBC AUTO: 84.1 FL (ref 80–94)
MONOCYTES # BLD: 1.2 /CUMM (ref 0.1–0.6)
PLATELET # BLD: 344 /CUMM (ref 130–400)
PMV BLD AUTO: 9.7 FL (ref 7.4–10.4)
RED BLOOD CELL CT: 4.51 /CUMM (ref 4.7–6.1)
WBC # BLD AUTO: 11.8 /CUMM (ref 4.8–10.8)

## 2018-05-09 NOTE — PN- HOUSESTAFF
Rocio BEDOLLA,Annemarie 18 0458:
Subjective
Follow-up For:
Left foot osteomyelitis, right heel ulcer with necrosis
Complaints: no complaints
Subjective:
Patient is in dialysis.
 
Review of Systems
Constitutional:
Reports: no symptoms, see HPI. 
 
Objective
Last 24 Hrs of Vital Signs/I&O
 Vital Signs
 
 
Date Time Temp Pulse Resp B/P B/P Pulse O2 O2 Flow FiO2
 
     Mean Ox Delivery Rate 
 
 1201 97.8 112 18 109/55  98 Room Air  
 
 0556 97.5 85 20 102/62  97 Room Air  
 
 2225 97.3 97  124/62  94 Room Air  
 
 
 Intake & Output
 
 
  1600  0800  0000
 
Intake Total  60 720
 
Output Total 600  0
 
Balance -600 60 720
 
    
 
Intake, Oral  60 720
 
Number   0
 
Bowel   
 
Movements   
 
Output, 600  
 
Dialysate   
 
Output, Urine   0
 
Patient 172 lb 179 lb 
 
Weight   
 
Weight Chair scale  
 
Measurement   
 
Method   
 
 
 
 
Physical Exam
General Appearance: Alert, Oriented X3, Cooperative, No Acute Distress
Cardiovascular: Regular Rate, Normal S1, Normal S2, No Murmurs
Lungs: Clear to Auscultation
Abdomen: Soft, No Tenderness, No Hepatospenomegaly
Neurological: Normal Speech, Strength at 5/5 X4 Ext, Normal Tone, Sensation 
Intact
Extremities: left foot in dressing.
Current Medications:
 Current Medications
 
 
  Sig/Jabari Start time  Last
 
Medication Dose Route Stop Time Status Admin
 
Acetaminophen 650 MG .STK-MED ONE  1442 DC 
 
  PO  1443  
 
Acetaminophen 650 MG Q6P PRN  2315 AC 
 
  PO   1337
 
Aspirin Buffered 81 MG DAILY  1215 AC 
 
  PO   1221
 
Atorvastatin Calcium 40 MG 1700  1700 AC 
 
  PO   1221
 
Bisacodyl 10 MG DAILY PRN  1130 AC 
 
  DE   
 
Cefazolin Sodium 1,000 MG MoWeFr  0945 AC 
 
  IV   1221
 
Cefazolin Sodium 1,000 MG SEE ADMIN CRITERIA  1800 DC 
 
  IV   1517
 
Docusate Sodium 100 MG DAILY AS NEEDED PRN  1000 AC 
 
  PO   
 
Heparin Sodium  5,000 UNIT Q8  2200 AC 
 
(Porcine)  SC   1337
 
Omeprazole 20 MG DAILY AC  0700 AC 
 
  PO   0553
 
Oxycodone/ 2 TAB Q6P PRN  0245 AC 
 
Acetaminophen  PO   1014
 
Patient Medication  1 ED ONE ONE  1230 DC 
 
[facility]  ED  1231  
 
Patient Medication  1 ED ONE ONE  1730 FL 
 
[facility]  ED  1731  1952
 
Polyethylene Glycol 17 GM DAILY  1015 AC 
 
  PO   
 
Senna/Docusate Sodium 1 TAB BID  2100 AC 
 
  PO   
 
 
 
 
Last 24 Hrs of Lab/Arnold Results
Last 24 Hrs of Labs/Mics:
 Laboratory Tests
 
18 1213:
BUN Cancelled
 
18 0745:
Anion Gap 18  H, Estimated GFR 6  L, BUN/Creatinine Ratio 4.6  L, Glucose 80, 
Calcium 8.7, Phosphorus 5.7  H, Magnesium 1.9, Albumin 3.7, CBC w Diff NO MAN 
DIFF REQ, RBC 4.51  L, MCV 84.1, MCH 26.9  L, MCHC 32.0  L, RDW 17.1  H, MPV 9.7
, Gran % 75.8  H, Lymphocytes % 12.4  L, Monocytes % 9.9  H, Eosinophils % 1.5, 
Basophils % 0.4, Absolute Granulocytes 8.9  H, Absolute Lymphocytes 1.5, 
Absolute Monocytes 1.2  H, Absolute Eosinophils 0.2, Absolute Basophils 0.1
 
 
Assessment/Plan
Assessment:
Mr. Pérez is a 51yo M w/ PMH of HTN, history of collapsed left lung, GERD, ESRD 
on HD on MWF through left thigh catheter, HyperPTH, Hx of MSSA Bacteremia in 
. Patient was brought in by ambulance from home for bilateral foot pain and 
inability to walk. 
 
Assessment and plan:
 
1.  Left foot osteomyelitis-patient denied MRI due to claustrophobia.  Patient 
had a CTA runoff which right mild popliteal disease, 50% stenosis at the 
adductor canal and left SFA with 50% narrowing in the popliteal artery on the 
left side.  Patient was seen by Dr. Meier who did a angiogram and 
angioplasty .  Patient had I&D and debridement for the same.
 
2.  Right foot ulcer with necrosis -   had for debridement and revision surgery 
.  Patient's OR cultures growing staph aureus.  Infectious disease on board.  
Patient was given 1 dose of vancomycin and then switched to cefazolin.  Patient 
will continue cefazolin after each dialysis for 4 weeks.  Awaiting for wound 
vac.  Medically stable to go home.  
 
3.  End-stage renal disease-patient is on hemodialysis on  and 
Friday.  He missed his past to dialysis due to weakness.  Nephrology on board.  
Patient is going for dialysis today.  Patient is on renal diet.  
 
4.  Weakness and gait instability-physical therapy consult placed.  Seen by 
physical therapy as suggested short-term rehabilitation.  According to podiatry 
he can weight bear.
 
5.  Continue rest of his home medication.
 
6.  Constipation-patient's last bowel movement is on 29 and April.  Refuses 
stool softeners.  Bowel sounds heard.  Ordered abdominal x-ray to rule out any 
fecal impaction, but the patient refused.
 
Patient discharge was held in view that the rehabilitation center refused to 
take him in view of constipation.  Patient didn't have bowel movement since 
.  He refused stool softeners.  After talking to the medical director of
[facility] he agreed to accept the patient.  Patient will
be discharged today.
Problem List:
 1. ESRD (end stage renal disease)
 
 2. Osteomyelitis
 
Pain Ratin
Pain Location:
none
Pain Goal: Remain pain free
Pain Plan:
tylenol
Tomorrow's Labs & Rationales:
none
 
 
Dewayne BEDOLLA,Karey 18 1624:
Attending MD Review Statement
 
Attending Statement
Attending MD Statement: examined this patient, discuss w/resident/PA/NP, agreed 
w/resident/PA/NP, reviewed EMR data (avail), discussed with nursing, discussed 
with case mgmt, reviewed images, amended to note
Attending Assessment/Plan:
Patient seen, no acute issues. [facility] bed available. Ready for DC to [facility].

## 2018-05-09 NOTE — ED UPPER/LOWER EXTREMITY COMPL
History of Present Illness
 
General
Chief Complaint: General Adult
Stated Complaint: FOOT PAIN
Source: patient, EMS
Exam Limitations: no limitations
 
Vital Signs & Intake/Output
Vital Signs & Intake/Output
 Vital Signs
 
 
Date Time Temp Pulse Resp B/P B/P Pulse O2 O2 Flow FiO2
 
     Mean Ox Delivery Rate 
 
05/10 1415 98.2 92 18 115/62  98 Room Air  
 
05/10 1215 97.6 88 18 109/57  98 Room Air  
 
05/10 0901 98.6 105 18 105/62  98 Room Air  
 
05/10 0527 98.4 68 16 101/58  98 Room Air  
 
05/09 2327 97.8 80 16 101/78  96 Room Air  
 
05/09 1920       Room Air  
 
05/09 1908 98.1 93 16 102/63  100 Room Air  
 
 
 ED Intake and Output
 
 
 05/10 0000 05/09 1200
 
Intake Total  
 
Output Total  
 
Balance  
 
   
 
Patient 185 lb 
 
Weight  
 
Weight Reported by Patient 
 
Measurement  
 
Method  
 
 
 
Allergies
Coded Allergies:
Iodinated Contrast- Oral and IV Dye (IODINATED CONTRAST MEDIA - IV DYE) (RASH 03
/19/18)
 
Reconcile Medications
Aspirin (Ecotrin*) 81 MG TABLET.DR   81 MG PO DAILY Heart
Atorvastatin Calcium 40 MG TABLET   40 MG PO 1700 Cholesterol
Cefazolin Sodium 1 GRAM VIAL   1,000 MG IV SEE ADMIN CRITERIA osteomyelitis
Omeprazole 20 MG CAPSULE.DR   1 CAP PO DAILY GI  (Reported)
Oxycodone HCl/Acetaminophen (Percocet 5-325 MG Tablet) 5 MG-325 MG TABLET   2 
TAB PO Q6P PRN PAIN SCALE 7-10 (SEVERE)
 
Triage Note:
PT BIBA FROM [facility] AFTER RECENT ADMIT TO [facility]. PT
 WAS DC'D TODAY FROM B AND WAS TO RECEIVE REHAB
 AT FACILITY IN [place]. PER PT, "IT WAS A DUMP"
 AND PT REFUSED TO STAY. C/O 5/10 FOOT PAIN WHICH
 HE HAS NOT MEDICATED
Triage Nurses Notes Reviewed? yes
Onset: Gradual
Duration: week(s):
Timing: recent history
Severity: moderate
Severity Numbers: 7
Pain/Injury Location:
Left: Foot. 
HPI:
51yo male with hx of HTN, ESRD on dialysis M W F BIBA from short term rehab for 
left foot pain. Patient states he was discharged earlier today to [place] short 
term rehab however was unhappy with the quality of the facility.  Patient states
he did not want to leave the hospital this morning in the first place.  Patient 
has had pain in left foot related to a wound which required surgery with Dr. Crocker.  Patient states that when he left the hospitalist morning he did not 
have significant pain, his pain was well-controlled Percocet.  Patient states 
that currently pain is 7/10, he has not had medication since earlier this 
morning.  [facility], 
states that this facility was also "a dump". Patient states he prefers to stay 
here at [facility], believes he was doing well here prior to discharge. Patient 
denies recent trauma or injury. He denies fevers, chills. Patient currently has 
wound vac in place to left posterior foot.
(Herminia DE LEON,Carmen Kirkland)
 
Past History
 
Travel History
Traveled to [place] past 21 day No
 
Medical History
Any Pertinent Medical History? see below for history
Neurological: migraine
EENT: NONE
Cardiovascular: hypertension
Respiratory: collapsed left lung with a persistent left pleural effusion.
Gastrointestinal: GERD, GI BLEED
Hepatic: NONE
Renal: ESRD on HD, DIALYSIS M-W-F ACCESS L LEG
Musculoskeletal: gout, osteoarthritis, secondary OSTEOPOROSIS GANGREEN L 2ND 
FINGER
Psychiatric: NONE
Endocrine: hyperparathyroidism
Blood Disorders: NONE
Cancer(s): NONE
GYN/Reproductive: NONE
Other Medical Hx:
In 2004 he had an infected abscess with MSSA bacteremia.
Complete central venous occlusion in 2007.
History of MRSA: No
History of VRE: No
History of CDIFF: No
Influenza Vaccine: 09/01/17
 
Surgical History
Surgical History: GRAFT, LEFT THIGH s/p placement of numerous August catheters/also
dual-lumen Amor's left radial cephalic primary AVF on 03/28/2000 left upper 
arm AVG Tenckhoff catheter placement and subsequent removal status post 
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
 
Psychosocial History
Who do you live with Family
[facility] at Home Nursing
What is your primary language English
Tobacco Use: Quit >30 days ago
ETOH Use: denies use
 
Family History
Family History, If Any:
MOTHER (Heart Dz, Breast Ca).
FATHER (Diabetes).
Relation not specified for:
  FH: stroke
 
Hx Contributory? No
(Carmen Lincoln)
 
Review of Systems
 
Review of Systems
Constitutional:
Reports: no symptoms. 
EENTM:
Reports: no symptoms. 
Respiratory:
Reports: no symptoms. 
Cardiovascular:
Reports: no symptoms. 
Gastrointestinal/Abdominal:
Reports: no symptoms. 
Genitourinary:
Reports: no symptoms. 
Musculoskeletal:
Reports: see HPI. 
Skin:
Reports: no symptoms. 
Neurological/Psychological:
Reports: no symptoms. 
Hematologic/Endocrine:
Reports: no symptoms. 
Immunological:
Reports: no symptoms. 
All Other Systems: Reviewed and Negative
(Carmen Lincoln)
 
Physical Exam
 
Physical Exam
General Appearance: well developed/nourished, no apparent distress, alert, awake
Head: atraumatic, normal appearance
Eyes:
Bilateral: normal appearance. 
Ears, Nose, Throat: hearing grossly normal
Neck: normal inspection, supple, full range of motion
Cardiovascular/Respiratory: no respiratory distress
Back: normal inspection, normal range of motion
Foot Left: dressing with ace bandage and wound vac in place to left foot
Foot Right: normal inspection, normal range of motion
Neurologic/Tendon: normal sensation
Skin: normal color
(Carmen Lincoln)
 
Progress
Differential Diagnosis: cellulitis, contusion, sprain, chronic wound
Plan of Care:
 Orders
 
 
Procedure Date/time Status
 
Renal Dialysis Diet 05/10 L Active
 
Regular Diet 05/10 B Complete
 
PT Evaluate & Treat 05/09 2053 Active
 
CASE MANAGEMENT CONSULT 05/09 2053 Active
 
 
 Current Medications
 
 
  Sig/Ajbari Start time  Last
 
Medication Dose  Stop Time Status Admin
 
Atorvastatin Calcium 40 MG 1700 05/10 1700 UNVr 
 
(Lipitor)     
 
Aspirin Buffered 81 MG DAILY 05/10 0900 UNVr 05/10
 
(Ecotrin)     0932
 
Omeprazole 20 MG DAILY 05/10 0900 UNVr 05/10
 
(Prilosec)     0932
 
Oxycodone/ 2 TAB Q6P PRN 05/09 2100 AC 
 
Acetaminophen     
 
(Percocet)     
 
Tetanus/Diphtheria  0.5 ML ONCE ONE 05/09 2045 CAN 
 
Toxoids Adsorbed   05/09 2046  
 
(Decavac)     
 
 
She had dialysis earlier today.  Patient was just discharged and cleared from 
hospital today, he returns because he is not satisfied with quality of short-
term rehabilitation.  No other acute changes since discharge.
 
Seen by case management team.  Patient to be evaluated by physical therapy 
tomorrow for possible placement in a different short-term rehabilitation 
facility.
 
The patient was signed out to Dr. Beebe pending PT/case management eval.
Hand-Off
   Endorsed To:
Carlos Beebe MD
   Endorsed Time: 2300
   Pending: consult
(Carmen Lincoln)
Hand-Off
   Endorsed To:
Rupert Rodriges MD
   Endorsed Time: 0700
   Pending: consult (case mgmt)
(Carlos Beebe MD)
Comments:
5/10/2018 7:16:37 AM patient signed out to me by Dr. Beebe at shift change 
over.
 
5/10/2018 3:13:25 PM patient evaluated by case management and he will be 
discharged to a rehabilitation facility.  His wound VAC dressing has been 
discontinued in favor of a wet-to-dry dressing.  The wound VAC will be placed 
tomorrow at the rehabilitation facility.
(Zahira BEDOLLA,Rupert PLASCENCIA)
 
Departure
 
Departure
Disposition: STILL A PATIENT
Condition: Stable
Clinical Impression
Primary Impression: Left foot pain
Referrals:
Patient Has No Primary Care Dr (PCP/Family)
 
Departure Forms:
Customer Survey
General Discharge Information
(Herminia DE LEON,Carmen Kirkland)
 
PA/NP Co-Sign Statement
Statement:
ED Attending supervision documentation-
 
x I saw and evaluated the patient. I have also reviewed all the pertinent lab 
results and diagnostic results. I agree with the findings and the plan of care 
as documented in the PA's/NP's documentation. 
 
[] I have reviewed the ED Record and agree with the PA's/NP's documentation.
 
[] Additions or exceptions (if any) to the PAs/NP's note and plan are 
summarized below:
[]
 
(Carlos Beebe MD)

## 2018-05-09 NOTE — PN- NEPHROLOGY
Assessment/Plan Nephrology
Assessment:
ESRD. MSSA osteomyelitis on Ancef.
Dialyzed earlier. Dialysis again Friday. Please have Case Management discuss 
outpt antibiotics with his outpatient dialysis unit (he does not go to our unit 
so I cannot arrange antibiotics)
Fidel Horner MD.
Suggestion:
.
 
 
Subjective
Subjective:
Pt comfortable.dialzyed earlier today.
 
Objective
Vital Signs and I&Os
 
M NAD
Skin neg rash
/62  85  97.5
Lungs clear
Cor RRR
Abd soft
Ext neg edema foot wrapped
 
Results
Pertinent Lab Results:
.

## 2018-05-10 VITALS — SYSTOLIC BLOOD PRESSURE: 130 MMHG | DIASTOLIC BLOOD PRESSURE: 73 MMHG

## 2018-07-02 ENCOUNTER — HOSPITAL ENCOUNTER (EMERGENCY)
Dept: HOSPITAL 68 - ERH | Age: 53
LOS: 1 days | End: 2018-07-03
Payer: COMMERCIAL

## 2018-07-02 VITALS — DIASTOLIC BLOOD PRESSURE: 69 MMHG | SYSTOLIC BLOOD PRESSURE: 99 MMHG

## 2018-07-02 DIAGNOSIS — N18.9: Primary | ICD-10-CM

## 2021-02-26 NOTE — DISCHARGE SUMMARY
**See Addendum**
Visit Information
 
Visit Dates
Admission Date:
03/19/18
 
Discharge Date:
3/27/18
 
 
Hospital Course
 
Course
Attending Physician:
Katia BEDOLLA,Mickey
 
Primary Care Physician:
Patient Has No Primary Care Dr
 
Hospital Course:
51 yo M with PMH of PVD, ESRD maintained on hemodialysis since April 2000, HTN, 
hyperparathyroidism, CVA, seizures, gout, GERD, s/p left upper extremity finger 
amputation due to chronic osteomyelitis who presented with with left heel 
osteomyelitis.
 
Admission Data:
Vitals stable. Left heel wound as described above, patient was not co-operative 
with exam. Bilateral feet have ecchymotic patches, shiny skin to medial aspect, 
peripheral pulses not well felt. Left thigh AV fistula++.
Labs: WBC 11.2, ESR 21, INR 1.07, AG 27, BUN 25, creat 7.0, lactic acid 2.8, 
trop neg, CRP elevated. 
Heel Xray: bony defect along distal aspect of calcaneus and focal osteomyelitis 
cannot be excluded.
EKG: sinus tachycardia, ventricular bigeminy, Qtc 505.
Echo (2016): EF > 60%. 
 
He was admitted to general medicine and treated for the following problems:
1. Left heel osteomyelitis
2. End-stage renal disease
 
#Left heel osteomyelitis: The patient underwent I&D followed by intraoperative 
administration of negative pressure wound therapy with Dr Crocker on 3/20. The 
chronic nature of the foot ulcer raised concerns for osteomyelitis and he was 
scheduled for an MRI. His ESR is low and his white count previously elevated on 
admission had normalized the next day. However, for unclear reasons the patient 
has completely refused an MRI. He partially attributed it to claustrophobia. He 
was unwilling to have the procedure even when offered anxiolytic prior to the 
procedure. His blood cultures 1/2 bottles grew diphtheroids, which were belived 
to be a contaminant. Infectious disease was consulted. Per ID the patient has 
been observed off antibiotics. Podiatry requested a consult with Vascular for 
assessment of his PVD. The patient underwent an arterial doppler which was 
suggestive of  bilateral inflow disease, left slightly worse than right. On 03/
23, he underwent an angiogram followed by angioplasty of anterior and posterior 
tibial artery, dorsalis pedis, peroneal artery with vascular surgery. Due to his
refusal of MRI, there can be no assessment for bone infection. The patient is 
stable from a medical standpoint. Per Podiatry patient is being discharged with 
a wound VAC. He will need to follow-up with podiatry and vascular surgery in 2 
weeks. He was discharged off antibiotics. If there is concern of infection, he 
can get a repeat x-ray and consider starting antibiotics as an outpatient with 
podiatry.
 
#ESRD: The patient states that he receives dialysis at [facility]. 
Dr Wloff in [place] his dialysis/renal issues per patient. He 
receives it M/W/F. This schedule was maintained at the hospital. He should 
follow-up with nephrology and resume his dialysis schedule.
 
#Chronic medical problems: His home medications were continued.
Allergies:
Coded Allergies:
Iodinated Contrast- Oral and IV Dye (IODINATED CONTRAST MEDIA - IV DYE) (RASH 03
/19/18)
 
 
Disposition Summary
 
Disposition
Principal Diagnosis:
1. Left heel osteomyelitis
 
Additional Diagnosis:
 
2. End-stage renal disease
Discharge Disposition: home or self care
 
Discharge Instructions
 
General Discharge Information
Code Status: Full Code
Patient's Diet:
Renal dialysis diet
Patient's Activity:
As tolerated
Follow-Up Instructions/Appts:
Please take all medications as directed. Please follow-up with nephrology, 
podiatry, and vascular surgery. Please follow-up with primary care.
 
Medications at Discharge
Discharge Medications:
Continue taking these medications:
Esomeprazole (Nexium) 40 MG CAPSULE.
    1 Capsule ORAL TWICE DAILY
 
Start taking the following new medications:
Oxycodone HCl/Acetaminophen (Percocet 5-325 MG Tablet) 5 MG-325 MG TABLET
    1 Tablet ORAL DAILY as needed for Foot Pain
    Qty = 3
    No Refills
    Instructions:
       .
 
 
Copies To:
New BEDOLLA,Aris CAMPBELL; Johnnie BEDOLLA,Ramiro Leung DPM, MD,Fdiel AGUIRRE; 
Joey BEDOLLA,Jonnathan BESS; Marilee BEDOLLA,Joe BHANDARI; Amparo BEDOLLA,Juan Pablo; Felicita BEDOLLA,
Kam (0) indicator not present